# Patient Record
Sex: FEMALE | Race: WHITE | NOT HISPANIC OR LATINO | Employment: FULL TIME | URBAN - METROPOLITAN AREA
[De-identification: names, ages, dates, MRNs, and addresses within clinical notes are randomized per-mention and may not be internally consistent; named-entity substitution may affect disease eponyms.]

---

## 2017-02-23 ENCOUNTER — TELEPHONE (OUTPATIENT)
Dept: OBGYN | Facility: CLINIC | Age: 37
End: 2017-02-23

## 2018-05-18 ENCOUNTER — OFFICE VISIT (OUTPATIENT)
Dept: OBGYN | Facility: CLINIC | Age: 38
End: 2018-05-18
Payer: COMMERCIAL

## 2018-05-18 VITALS
SYSTOLIC BLOOD PRESSURE: 110 MMHG | DIASTOLIC BLOOD PRESSURE: 58 MMHG | HEIGHT: 68 IN | HEART RATE: 68 BPM | WEIGHT: 185 LBS | BODY MASS INDEX: 28.04 KG/M2

## 2018-05-18 DIAGNOSIS — R30.0 DYSURIA: Primary | ICD-10-CM

## 2018-05-18 LAB
ALBUMIN UR-MCNC: NEGATIVE MG/DL
APPEARANCE UR: CLEAR
BILIRUB UR QL STRIP: NEGATIVE
COLOR UR AUTO: YELLOW
GLUCOSE UR STRIP-MCNC: NEGATIVE MG/DL
HGB UR QL STRIP: NEGATIVE
KETONES UR STRIP-MCNC: NEGATIVE MG/DL
LEUKOCYTE ESTERASE UR QL STRIP: NEGATIVE
NITRATE UR QL: NEGATIVE
PH UR STRIP: 7 PH (ref 5–7)
SOURCE: NORMAL
SP GR UR STRIP: 1.01 (ref 1–1.03)
UROBILINOGEN UR STRIP-ACNC: 0.2 EU/DL (ref 0.2–1)

## 2018-05-18 PROCEDURE — 99213 OFFICE O/P EST LOW 20 MIN: CPT | Performed by: NURSE PRACTITIONER

## 2018-05-18 PROCEDURE — 87086 URINE CULTURE/COLONY COUNT: CPT | Performed by: NURSE PRACTITIONER

## 2018-05-18 PROCEDURE — 81003 URINALYSIS AUTO W/O SCOPE: CPT | Performed by: NURSE PRACTITIONER

## 2018-05-18 NOTE — MR AVS SNAPSHOT
"              After Visit Summary   2018    Shayy Ndiaye    MRN: [id number]           Patient Information     Date Of Birth          1980        Visit Information        Provider Department      2018 1:00 PM Serina Palacios APRN CNP [facility]        Today's Diagnoses     Dysuria    -  1       Follow-ups after your visit        Who to contact     If you have questions or need follow up information about today's clinic visit or your schedule please contact [facility] directly at [phone].  Normal or non-critical lab and imaging results will be communicated to you by Pretty Simplehart, letter or phone within 4 business days after the clinic has received the results. If you do not hear from us within 7 days, please contact the clinic through Pretty Simplehart or phone. If you have a critical or abnormal lab result, we will notify you by phone as soon as possible.  Submit refill requests through Ceros or call your pharmacy and they will forward the refill request to us. Please allow 3 business days for your refill to be completed.          Additional Information About Your Visit        MyChart Information     Ceros lets you send messages to your doctor, view your test results, renew your prescriptions, schedule appointments and more. To sign up, go to [email or web address] . Click on \"Log in\" on the left side of the screen, which will take you to the Welcome page. Then click on \"Sign up Now\" on the right side of the page.     You will be asked to enter the access code listed below, as well as some personal information. Please follow the directions to create your username and password.     Your access code is: [email or web address]  Expires: 2018  1:38 PM     Your access code will  in 90 days. If you need help or a new code, please call your [place] clinic or [phone].        Care EveryWhere ID     This is your Care EveryWhere ID. This could be used by other " "organizations to access your [facility] medical records  [id number]        Your Vitals Were     Pulse Height Last Period BMI (Body Mass Index)          68 5' 8\" (1.727 m) 05/05/2018 28.13 kg/m2         Blood Pressure from Last 3 Encounters:   05/18/18 110/58   07/23/15 120/74    Weight from Last 3 Encounters:   05/18/18 185 lb (83.9 kg)   07/23/15 185 lb (83.9 kg)              We Performed the Following     UA without Microscopic     Urine Culture Aerobic Bacterial        Primary Care Provider Office Phone # Fax #    [facility] [phone]       [facility] [address] [place] [address] AB  [facility] [address]  [facility] [address]        Equal Access to Services     SANTIAGO MCCORMICK : Hadii chip hutchisono Soyves, waaxda luqadaha, qaybta kaalmada adeegyada, laly kim . So [facility] [phone].    ATENCIÓN: Si habla español, tiene a lawrence disposición servicios gratuitos de asistencia lingüística. Llame al [phone].    We comply with applicable federal civil rights laws and [place] laws. We do not discriminate on the basis of race, color, national origin, age, disability, sex, sexual orientation, or gender identity.            Thank you!     Thank you for choosing [facility]  for your care. Our goal is always to provide you with excellent care. Hearing back from our patients is one way we can continue to improve our services. Please take a few minutes to complete the written survey that you may receive in the mail after your visit with us. Thank you!             Your Updated Medication List - Protect others around you: Learn how to safely use, store and throw away your medicines at www.disposemymeds.org.          This list is accurate as of 5/18/18  1:38 PM.  Always use your most recent med list.                   Brand Name Dispense Instructions for use Diagnosis    nystatin-triamcinolone cream    MYCOLOG II    15 g    Apply " topically 2 times daily    Rash and nonspecific skin eruption

## 2018-05-18 NOTE — PROGRESS NOTES
"    SUBJECTIVE:                                                   Shayy Ndiaye is a 37 year old female who presents to clinic today for the following health issue(s):  Patient presents with:  Urinary Problem: uti       HPI:  Pt here today with feeling of \"discomfort\" after she voids. x1 week. Has been pushing fluids     She denies dysuria, urinary urgency. Doesn't have any vaginal discharge, odor or itching.     Her cycles have become shorter, closer together.    Hx of endometriosis. Last LSC in . Pelvic adhesive disease. Denies pain with IC/BM/dysmenorrhea    Sexually active. Voids after IC.     New family history. Mom dx breast ca at age 64. Negative genetic testing.     Pt should have baseline mammo before age 42    Patient's last menstrual period was 2018..   Patient is sexually active, .  Using condoms for contraception.    reports that she has never smoked. She has never used smokeless tobacco.    STD testing offered?  Declined    Health maintenance updated:  yes    Today's PHQ-2 Score:   PHQ-2 (  [employer]) 2015   Q1: Little interest or pleasure in doing things 0   Q2: Feeling down, depressed or hopeless 0   PHQ-2 Score 0     Today's PHQ-9 Score: No flowsheet data found.  Today's PAYAM-7 Score: No flowsheet data found.    Problem list and histories reviewed & adjusted, as indicated.  Additional history: as documented.    Patient Active Problem List   Diagnosis     Endometriosis     Past Surgical History:   Procedure Laterality Date     D & C      elected AB     HC TOOTH EXTRACTION W/FORCEP       LAPAROSCOPY DIAGNOSTIC (GYN)  2008    endo     LAPAROSCOPY DIAGNOSTIC (GYN)  2008    endo     TONSILLECTOMY & ADENOIDECTOMY        Social History   Substance Use Topics     Smoking status: Never Smoker     Smokeless tobacco: Never Used     Alcohol use Yes      Problem (# of Occurrences) Relation (Name,Age of Onset)    DIABETES (2) Father, Paternal Aunt    Hyperlipidemia (1) Mother    " "        Current Outpatient Prescriptions   Medication Sig     nystatin-triamcinolone (MYCOLOG II) cream Apply topically 2 times daily     No current facility-administered medications for this visit.      Allergies   Allergen Reactions     Dust Mite Extract      STUFFY NOSE     Minocycline Unknown and Nausea     Nuts Rash     NOSE STUFFY   EYES ITCHY       ROS:      OBJECTIVE:     /58  Pulse 68  Ht 5' 8\" (1.727 m)  Wt 185 lb (83.9 kg)  LMP 05/05/2018  BMI 28.13 kg/m2  Body mass index is 28.13 kg/(m^2).    Exam:  Constitutional:  Appearance: Well nourished, well developed alert, in no acute distress  Neurologic/Psychiatric:  Mental Status:  Oriented X3   Pelvic Exam:  External Genitalia:     Normal appearance for age, no discharge present, no tenderness present, no inflammatory lesions present, color normal  Vagina:     Normal vaginal vault without central or paravaginal defects, no discharge present, no inflammatory lesions present, no masses present  Bladder:     Nontender to palpation  Urethra:   Urethral Body:  Urethra palpation normal, urethra structural support normal   Urethral Meatus:  No erythema or lesions present  Cervix:     Appearance healthy, no lesions present, nontender to palpation, no bleeding present  Uterus:     Uterus: firm, normal sized and nontender, midplane in position.   Adnexa:     No adnexal tenderness present, no adnexal masses present  Perineum:     Perineum within normal limits, no evidence of trauma, no rashes or skin lesions present  Anus:     Anus within normal limits, no hemorrhoids present  Inguinal Lymph Nodes:     No lymphadenopathy present  Pubic Hair:     Normal pubic hair distribution for age  Genitalia and Groin:     No rashes present, no lesions present, no areas of discoloration, no masses present       In-Clinic Test Results:  Results for orders placed or performed in visit on 05/18/18 (from the past 24 hour(s))   UA without Microscopic   Result Value Ref Range "    Color Urine Yellow     Appearance Urine Clear     Glucose Urine Negative NEG^Negative mg/dL    Bilirubin Urine Negative NEG^Negative    Ketones Urine Negative NEG^Negative mg/dL    Specific Gravity Urine 1.010 1.003 - 1.035    Blood Urine Negative NEG^Negative    pH Urine 7.0 5.0 - 7.0 pH    Protein Albumin Urine Negative NEG^Negative mg/dL    Urobilinogen Urine 0.2 0.2 - 1.0 EU/dL    Nitrite Urine Negative NEG^Negative    Leukocyte Esterase Urine Negative NEG^Negative    Source Midstream Urine        ASSESSMENT/PLAN:                                                        ICD-10-CM    1. Dysuria R30.0 UA without Microscopic     Urine Culture Aerobic Bacterial       There are no Patient Instructions on file for this visit.    Pt with negative UA. Will send UC. Likely she flushed any infection she may have had. Will monitor. Due for annual/pap.     SANNA Boswell CNP  [facility]

## 2018-05-19 LAB
BACTERIA SPEC CULT: NO GROWTH
Lab: NORMAL
SPECIMEN SOURCE: NORMAL

## 2018-06-08 ENCOUNTER — RESULT FOLLOW UP (OUTPATIENT)
Dept: OBGYN | Facility: CLINIC | Age: 38
End: 2018-06-08

## 2018-06-08 ENCOUNTER — OFFICE VISIT (OUTPATIENT)
Dept: OBGYN | Facility: CLINIC | Age: 38
End: 2018-06-08
Payer: COMMERCIAL

## 2018-06-08 VITALS
HEIGHT: 68 IN | SYSTOLIC BLOOD PRESSURE: 116 MMHG | WEIGHT: 183 LBS | BODY MASS INDEX: 27.74 KG/M2 | DIASTOLIC BLOOD PRESSURE: 78 MMHG | HEART RATE: 68 BPM

## 2018-06-08 DIAGNOSIS — Z13.6 SCREENING FOR CARDIOVASCULAR CONDITION: ICD-10-CM

## 2018-06-08 DIAGNOSIS — N80.9 ENDOMETRIOSIS: ICD-10-CM

## 2018-06-08 DIAGNOSIS — Z01.419 ENCOUNTER FOR GYNECOLOGICAL EXAMINATION WITHOUT ABNORMAL FINDING: Primary | ICD-10-CM

## 2018-06-08 DIAGNOSIS — R87.610 ASCUS WITH POSITIVE HIGH RISK HPV CERVICAL: ICD-10-CM

## 2018-06-08 DIAGNOSIS — E55.9 VITAMIN D DEFICIENCY: ICD-10-CM

## 2018-06-08 DIAGNOSIS — R87.810 ASCUS WITH POSITIVE HIGH RISK HPV CERVICAL: ICD-10-CM

## 2018-06-08 DIAGNOSIS — E53.9 VITAMIN B-COMPLEX DEFICIENCY: ICD-10-CM

## 2018-06-08 DIAGNOSIS — Z13.228 SCREENING FOR METABOLIC DISORDER: ICD-10-CM

## 2018-06-08 DIAGNOSIS — Z12.31 ENCOUNTER FOR SCREENING MAMMOGRAM FOR MALIGNANT NEOPLASM OF BREAST: ICD-10-CM

## 2018-06-08 DIAGNOSIS — Z13.29 SCREENING FOR THYROID DISORDER: ICD-10-CM

## 2018-06-08 LAB
ALBUMIN SERPL-MCNC: 4.1 G/DL (ref 3.4–5)
ALP SERPL-CCNC: 89 U/L (ref 40–150)
ALT SERPL W P-5'-P-CCNC: 31 U/L (ref 0–50)
ANION GAP SERPL CALCULATED.3IONS-SCNC: 9 MMOL/L (ref 3–14)
AST SERPL W P-5'-P-CCNC: 20 U/L (ref 0–45)
BILIRUB SERPL-MCNC: 0.2 MG/DL (ref 0.2–1.3)
BUN SERPL-MCNC: 6 MG/DL (ref 7–30)
CALCIUM SERPL-MCNC: 9.1 MG/DL (ref 8.5–10.1)
CHLORIDE SERPL-SCNC: 107 MMOL/L (ref 94–109)
CHOLEST SERPL-MCNC: 135 MG/DL
CO2 SERPL-SCNC: 25 MMOL/L (ref 20–32)
CREAT SERPL-MCNC: 0.64 MG/DL (ref 0.52–1.04)
GFR SERPL CREATININE-BSD FRML MDRD: >90 ML/MIN/1.7M2
GLUCOSE SERPL-MCNC: 99 MG/DL (ref 70–99)
HDLC SERPL-MCNC: 58 MG/DL
LDLC SERPL CALC-MCNC: 70 MG/DL
NONHDLC SERPL-MCNC: 77 MG/DL
POTASSIUM SERPL-SCNC: 3.8 MMOL/L (ref 3.4–5.3)
PROT SERPL-MCNC: 7.5 G/DL (ref 6.8–8.8)
SODIUM SERPL-SCNC: 141 MMOL/L (ref 133–144)
TRIGL SERPL-MCNC: 37 MG/DL
TSH SERPL DL<=0.005 MIU/L-ACNC: 1.79 MU/L (ref 0.4–4)
VIT B12 SERPL-MCNC: 843 PG/ML (ref 193–986)

## 2018-06-08 PROCEDURE — 84443 ASSAY THYROID STIM HORMONE: CPT | Performed by: NURSE PRACTITIONER

## 2018-06-08 PROCEDURE — 82306 VITAMIN D 25 HYDROXY: CPT | Performed by: NURSE PRACTITIONER

## 2018-06-08 PROCEDURE — 84591 ASSAY OF NOS VITAMIN: CPT | Mod: 90 | Performed by: NURSE PRACTITIONER

## 2018-06-08 PROCEDURE — 36415 COLL VENOUS BLD VENIPUNCTURE: CPT | Performed by: NURSE PRACTITIONER

## 2018-06-08 PROCEDURE — 99395 PREV VISIT EST AGE 18-39: CPT | Performed by: NURSE PRACTITIONER

## 2018-06-08 PROCEDURE — G0124 SCREEN C/V THIN LAYER BY MD: HCPCS | Performed by: NURSE PRACTITIONER

## 2018-06-08 PROCEDURE — 80053 COMPREHEN METABOLIC PANEL: CPT | Performed by: NURSE PRACTITIONER

## 2018-06-08 PROCEDURE — 87624 HPV HI-RISK TYP POOLED RSLT: CPT | Performed by: NURSE PRACTITIONER

## 2018-06-08 PROCEDURE — 84207 ASSAY OF VITAMIN B-6: CPT | Mod: 90 | Performed by: NURSE PRACTITIONER

## 2018-06-08 PROCEDURE — 80061 LIPID PANEL: CPT | Performed by: NURSE PRACTITIONER

## 2018-06-08 PROCEDURE — 99000 SPECIMEN HANDLING OFFICE-LAB: CPT | Performed by: NURSE PRACTITIONER

## 2018-06-08 PROCEDURE — G0145 SCR C/V CYTO,THINLAYER,RESCR: HCPCS | Performed by: NURSE PRACTITIONER

## 2018-06-08 PROCEDURE — 84252 ASSAY OF VITAMIN B-2: CPT | Mod: 90 | Performed by: NURSE PRACTITIONER

## 2018-06-08 PROCEDURE — 82607 VITAMIN B-12: CPT | Performed by: NURSE PRACTITIONER

## 2018-06-08 ASSESSMENT — ANXIETY QUESTIONNAIRES
6. BECOMING EASILY ANNOYED OR IRRITABLE: SEVERAL DAYS
7. FEELING AFRAID AS IF SOMETHING AWFUL MIGHT HAPPEN: NOT AT ALL
IF YOU CHECKED OFF ANY PROBLEMS ON THIS QUESTIONNAIRE, HOW DIFFICULT HAVE THESE PROBLEMS MADE IT FOR YOU TO DO YOUR WORK, TAKE CARE OF THINGS AT HOME, OR GET ALONG WITH OTHER PEOPLE: NOT DIFFICULT AT ALL
3. WORRYING TOO MUCH ABOUT DIFFERENT THINGS: SEVERAL DAYS
GAD7 TOTAL SCORE: 3
5. BEING SO RESTLESS THAT IT IS HARD TO SIT STILL: NOT AT ALL
2. NOT BEING ABLE TO STOP OR CONTROL WORRYING: NOT AT ALL
1. FEELING NERVOUS, ANXIOUS, OR ON EDGE: SEVERAL DAYS

## 2018-06-08 ASSESSMENT — PATIENT HEALTH QUESTIONNAIRE - PHQ9: 5. POOR APPETITE OR OVEREATING: NOT AT ALL

## 2018-06-08 NOTE — MR AVS SNAPSHOT
After Visit Summary   6/8/2018    Shayy Ndiaye    MRN: [id number]           Patient Information     Date Of Birth          1980        Visit Information        Provider Department      6/8/2018 9:00 AM Serina Palacios APRN CNP [facility]        Today's Diagnoses     Encounter for gynecological examination without abnormal finding    -  1    Screening for cardiovascular condition        Screening for metabolic disorder        Endometriosis        Vitamin D deficiency        Vitamin B-complex deficiency        Screening for thyroid disorder        Encounter for screening mammogram for malignant neoplasm of breast           Follow-ups after your visit        Follow-up notes from your care team     Return in about 1 year (around 6/8/2019).      Future tests that were ordered for you today     Open Future Orders        Priority Expected Expires Ordered    MA Screen Bilateral w/Garett Routine  6/8/2019 6/8/2018    US Transvaginal Non OB Routine  6/8/2019 6/8/2018            Who to contact     If you have questions or need follow up information about today's clinic visit or your schedule please contact [facility] directly at [phone].  Normal or non-critical lab and imaging results will be communicated to you by MyChart, letter or phone within 4 business days after the clinic has received the results. If you do not hear from us within 7 days, please contact the clinic through Reebonzhart or phone. If you have a critical or abnormal lab result, we will notify you by phone as soon as possible.  Submit refill requests through Freight Connection or call your pharmacy and they will forward the refill request to us. Please allow 3 business days for your refill to be completed.          Additional Information About Your Visit        MyChart Information     Freight Connection lets you send messages to your doctor, view your test results, renew your prescriptions, schedule appointments and  "more. To sign up, go to [email or web address] . Click on \"Log in\" on the left side of the screen, which will take you to the Welcome page. Then click on \"Sign up Now\" on the right side of the page.     You will be asked to enter the access code listed below, as well as some personal information. Please follow the directions to create your username and password.     Your access code is: [email or web address]  Expires: 2018  1:38 PM     Your access code will  in 90 days. If you need help or a new code, please call your [place] clinic or [phone].        Care EveryWhere ID     This is your Care EveryWhere ID. This could be used by other organizations to access your [facility] medical records  [id number]        Your Vitals Were     Pulse Height Last Period BMI (Body Mass Index)          68 5' 8\" (1.727 m) 2018 27.83 kg/m2         Blood Pressure from Last 3 Encounters:   18 116/78   18 110/58   07/23/15 120/74    Weight from Last 3 Encounters:   18 183 lb (83 kg)   18 185 lb (83.9 kg)   07/23/15 185 lb (83.9 kg)              We Performed the Following     Comprehensive metabolic panel     HPV High Risk Types DNA Cervical     Lipid panel reflex to direct LDL Fasting     Pap imaged thin layer screen with HPV - recommended age 30 - 65     TSH with free T4 reflex     Vitamin B12     Vitamin B2     Vitamin B3     Vitamin B6     Vitamin D deficiency screening        Primary Care Provider Office Phone # Fax #    [facility] [phone]       [facility] [address]  [facility] [address]  [facility] [address]        Equal Access to Services     SANTIAGO MCCORMICK : Mark Walls, david collins, jose j madrigal, laly raman. So [facility] [phone].    ATENCIÓN: Si habla español, tiene a lawrence disposición servicios gratuitos de asistencia lingüística. Llame al [phone].    We comply " with applicable federal civil rights laws and [place] laws. We do not discriminate on the basis of race, color, national origin, age, disability, sex, sexual orientation, or gender identity.            Thank you!     Thank you for choosing [facility]  for your care. Our goal is always to provide you with excellent care. Hearing back from our patients is one way we can continue to improve our services. Please take a few minutes to complete the written survey that you may receive in the mail after your visit with us. Thank you!             Your Updated Medication List - Protect others around you: Learn how to safely use, store and throw away your medicines at www.disposemymeds.org.          This list is accurate as of 6/8/18  9:38 AM.  Always use your most recent med list.                   Brand Name Dispense Instructions for use Diagnosis    vitamin B complex with vitamin C Tabs tablet      Take 1 tablet by mouth daily        VITAMIN D (CHOLECALCIFEROL) PO      Take by mouth daily

## 2018-06-08 NOTE — PROGRESS NOTES
Shayy is a 37 year old  female who presents for annual exam.     Besides routine health maintenance,  she would like to discuss periods, ovulation pain-may want labs checked.    HPI:  The patient's PCP is  [facility].  Pt here today for her annual exam.   She is feeling well. She's had a nagging right adnexal pain for about 5 days. Her cycles have become shorter, about 21 days vs 28. She denies pain with IC/bladder/colon function. Hx of endometriosis. lsc in . Has not had imaging for her endometriosis in 10 years or so. She states in 2008 she had stage 4, went through lupron. She has a natalie pain threshold so she's not reliable in terms of symptoms she says    Is not preventing nor trying for pregnancy. Practicing NFP.    Pt requesting labs today. She want's a full panel of B vitamins. She supplements but thinks she's getting too much.     GYNECOLOGIC HISTORY:    Patient's last menstrual period was 2018.  Her current contraception method is: none.  She  reports that she has never smoked. She has never used smokeless tobacco.    Patient is sexually active.  STD testing offered?  Declined  Last PHQ-9 score on record =   PHQ-9 SCORE 2018   Total Score 2     Last GAD7 score on record =   PAYAM-7 SCORE 2018   Total Score 3     Alcohol Score = 2    HEALTH MAINTENANCE:  Cholesterol: long ago  Last Mammo: never, Result: not applicable, Next Mammo:  Age 42  Pap:   Lab Results   Component Value Date    PAP NIL 2015      Colonoscopy:  never, Result: not applicable, Next Colonoscopy: age 52 years.  Dexa:  never    Health maintenance updated:  no    HISTORY:  Obstetric History       T0      L0     SAB0   TAB1   Ectopic0   Multiple0   Live Births0       # Outcome Date GA Lbr [place]/2nd Weight Sex Delivery Anes PTL Lv   1 TAB                   Patient Active Problem List   Diagnosis     Endometriosis     Past Surgical History:   Procedure Laterality Date     D  "& C  1999    elected [facility] TOOTH EXTRACTION W/FORCEP       LAPAROSCOPY DIAGNOSTIC (GYN)  05/2008    endo     LAPAROSCOPY DIAGNOSTIC (GYN)  09/2008    endo     TONSILLECTOMY & ADENOIDECTOMY        Social History   Substance Use Topics     Smoking status: Never Smoker     Smokeless tobacco: Never Used     Alcohol use Yes      Problem (# of Occurrences) Relation (Name,Age of Onset)    DIABETES (2) Father, Paternal Aunt    Hyperlipidemia (1) Mother            Current Outpatient Prescriptions   Medication Sig     vitamin B complex with vitamin C (VITAMIN  B COMPLEX) TABS tablet Take 1 tablet by mouth daily     VITAMIN D, CHOLECALCIFEROL, PO Take by mouth daily     No current facility-administered medications for this visit.      Allergies   Allergen Reactions     Dust Mite Extract      STUFFY NOSE     Minocycline Unknown and Nausea     Nuts Rash     NOSE STUFFY   EYES ITCHY       Past medical, surgical, social and family histories were reviewed and updated in EPIC.    ROS:   12 point review of systems negative other than symptoms noted below.  Breast: Tenderness  Genitourinary: Irregular Menses, Pelvic Pain and Significant PMS    EXAM:  /78  Pulse 68  Ht 5' 8\" (1.727 m)  Wt 183 lb (83 kg)  LMP 05/30/2018  BMI 27.83 kg/m2   BMI: Body mass index is 27.83 kg/(m^2).    PHYSICAL EXAM:  Constitutional:  Appearance: Well nourished, well developed, alert, in no acute distress  Neck:  Lymph Nodes:  No lymphadenopathy present    Thyroid:  Gland size normal, nontender, no nodules or masses present  on palpation  Chest:  Respiratory Effort:  Breathing unlabored  Cardiovascular:    Heart: Auscultation:  Regular rate, normal rhythm, no murmurs present  Breasts: Inspection of Breasts:  No lymphadenopathy present., Palpation of Breasts and Axillae:  No masses present on palpation, no breast tenderness., Axillary Lymph Nodes:  No lymphadenopathy present. and No nodularity, asymmetry or nipple discharge " bilaterally.  Gastrointestinal:   Abdominal Examination:  Abdomen nontender to palpation, tone normal without rigidity or guarding, no masses present, umbilicus without lesions   Liver and Spleen:  No hepatomegaly present, liver nontender to palpation    Hernias:  No hernias present  Lymphatic: Lymph Nodes:  No other lymphadenopathy present  Skin:  General Inspection:  No rashes present, no lesions present, no areas of  discoloration    Genitalia and Groin:  No rashes present, no lesions present, no areas of  discoloration, no masses present  Neurologic/Psychiatric:    Mental Status:  Oriented X3     Pelvic Exam:  External Genitalia:     Normal appearance for age, no discharge present, no tenderness present, no inflammatory lesions present, color normal  Vagina:     Normal vaginal vault without central or paravaginal defects, no discharge present, no inflammatory lesions present, no masses present  Bladder:     Nontender to palpation  Urethra:   Urethral Body:  Urethra palpation normal, urethra structural support normal   Urethral Meatus:  No erythema or lesions present  Cervix:     Appearance healthy, no lesions present, nontender to palpation, no bleeding present  Uterus:     Uterus: firm, normal sized and nontender, midplane in position.   Adnexa:     No adnexal tenderness present, no adnexal masses present  Perineum:     Perineum within normal limits, no evidence of trauma, no rashes or skin lesions present  Anus:     Anus within normal limits, no hemorrhoids present  Inguinal Lymph Nodes:     No lymphadenopathy present  Pubic Hair:     Normal pubic hair distribution for age  Genitalia and Groin:     No rashes present, no lesions present, no areas of discoloration, no masses present      COUNSELING:   Special attention given to:        Regular exercise       Healthy diet/nutrition       Family planning    BMI: Body mass index is 27.83 kg/(m^2).  Weight management plan: Discussed healthy diet and exercise  guidelines and patient will follow up in 12 months in clinic to re-evaluate.    ASSESSMENT:  37 year old female with satisfactory annual exam.    ICD-10-CM    1. Encounter for gynecological examination without abnormal finding Z01.419 Pap imaged thin layer screen with HPV - recommended age 30 - 65     HPV High Risk Types DNA Cervical   2. Screening for cardiovascular condition Z13.6 Lipid panel reflex to direct LDL Fasting   3. Screening for metabolic disorder Z13.228 Comprehensive metabolic panel   4. Endometriosis N80.9 US Transvaginal Non OB   5. Vitamin D deficiency E55.9 Vitamin D deficiency screening   6. Vitamin B-complex deficiency E53.9 Vitamin B2     Vitamin B12     Vitamin B6     Vitamin B3   7. Screening for thyroid disorder Z13.29 TSH with free T4 reflex   8. Encounter for screening mammogram for malignant neoplasm of breast Z12.31 MA Screen Bilateral w/Garett       PLAN:  Normal gyn exam. No adnexal pain with exam. Annual pap screening recommended. Mother with BReast ca. Will do baseline mammo this year. Then annually at 42. Labs today. Us for endo update.    Serina Palacios, SANNA CNP

## 2018-06-08 NOTE — LETTER
6/19/2018     Shayy Ndiaye  [address] [facility] [address]        Shayyjay Ndiaye your lab results came back normal.       Results for orders placed or performed in visit on 06/08/18   Result Value Ref Range    Cholesterol 135 <200 mg/dL    Triglycerides 37 <150 mg/dL    HDL Cholesterol 58 >49 mg/dL    LDL Cholesterol Calculated 70 <100 mg/dL    Non HDL Cholesterol 77 <130 mg/dL   Comprehensive metabolic panel   Result Value Ref Range    Sodium 141 133 - 144 mmol/L    Potassium 3.8 3.4 - 5.3 mmol/L    Chloride 107 94 - 109 mmol/L    Carbon Dioxide 25 20 - 32 mmol/L    Anion Gap 9 3 - 14 mmol/L    Glucose 99 70 - 99 mg/dL    Urea Nitrogen 6 (L) 7 - 30 mg/dL    Creatinine 0.64 0.52 - 1.04 mg/dL    GFR Estimate >90 >60 mL/min/1.7m2    GFR Estimate If Black >90 >60 mL/min/1.7m2    Calcium 9.1 8.5 - 10.1 mg/dL    Bilirubin Total 0.2 0.2 - 1.3 mg/dL    Albumin 4.1 3.4 - 5.0 g/dL    Protein Total 7.5 6.8 - 8.8 g/dL    Alkaline Phosphatase 89 40 - 150 U/L    ALT 31 0 - 50 U/L    AST 20 0 - 45 U/L   Vitamin D deficiency screening   Result Value Ref Range    Vitamin D Deficiency screening 45 20 - 75 ug/L   Vitamin B2   Result Value Ref Range    Vitamin B2 12 1 - 19 mcg/L   Vitamin B12   Result Value Ref Range    Vitamin B12 843 193 - 986 pg/mL   Vitamin B6   Result Value Ref Range    Vitamin B6 227.9 (H) 20.0 - 125.0 nmol/L   Vitamin B3   Result Value Ref Range    Niacin (Vitamin B3) 4.91 0.50 - 8.45 ug/mL   TSH with free T4 reflex   Result Value Ref Range    TSH 1.79 0.40 - 4.00 mU/L       Call our office if you have any questions.    Cordially,    SANNA Boswell CNP

## 2018-06-09 ASSESSMENT — PATIENT HEALTH QUESTIONNAIRE - PHQ9: SUM OF ALL RESPONSES TO PHQ QUESTIONS 1-9: 2

## 2018-06-09 ASSESSMENT — ANXIETY QUESTIONNAIRES: GAD7 TOTAL SCORE: 3

## 2018-06-11 LAB
DEPRECATED CALCIDIOL+CALCIFEROL SERPL-MC: 45 UG/L (ref 20–75)
VIT B6 SERPL-MCNC: 227.9 NMOL/L (ref 20–125)

## 2018-06-12 ENCOUNTER — OFFICE VISIT (OUTPATIENT)
Dept: OBGYN | Facility: CLINIC | Age: 38
End: 2018-06-12
Attending: NURSE PRACTITIONER
Payer: COMMERCIAL

## 2018-06-12 ENCOUNTER — RADIANT APPOINTMENT (OUTPATIENT)
Dept: ULTRASOUND IMAGING | Facility: CLINIC | Age: 38
End: 2018-06-12
Attending: NURSE PRACTITIONER
Payer: COMMERCIAL

## 2018-06-12 VITALS
DIASTOLIC BLOOD PRESSURE: 86 MMHG | SYSTOLIC BLOOD PRESSURE: 162 MMHG | HEIGHT: 68 IN | WEIGHT: 183 LBS | BODY MASS INDEX: 27.74 KG/M2

## 2018-06-12 DIAGNOSIS — N80.109 ENDOMETRIOSIS OF OVARY: Primary | ICD-10-CM

## 2018-06-12 DIAGNOSIS — N80.9 ENDOMETRIOSIS: ICD-10-CM

## 2018-06-12 LAB
COPATH REPORT: ABNORMAL
PAP: ABNORMAL
VIT B2 SERPL-MCNC: 12 MCG/L (ref 1–19)

## 2018-06-12 PROCEDURE — 76830 TRANSVAGINAL US NON-OB: CPT | Performed by: OBSTETRICS & GYNECOLOGY

## 2018-06-12 PROCEDURE — 99214 OFFICE O/P EST MOD 30 MIN: CPT | Performed by: OBSTETRICS & GYNECOLOGY

## 2018-06-12 NOTE — PROGRESS NOTES
SUBJECTIVE:                                                   Shayy Ndiaye is a 37 year old female who presents to clinic today for the following health issue(s):  No chief complaint on file.      HPI: The patient is seen at this time in follow-up of history of endometriosis.  She has had a couple of terrible cycles and has right lower quadrant pain.      Patient's last menstrual period was 2018..   Patient is sexually active, .  Using none for contraception.    reports that she has never smoked. She has never used smokeless tobacco.    STD testing offered?  Declined    Health maintenance updated:  yes    Today's PHQ-2 Score:   PHQ-2 (  [employer]) 2015   Q1: Little interest or pleasure in doing things 0   Q2: Feeling down, depressed or hopeless 0   PHQ-2 Score 0     Today's PHQ-9 Score:   PHQ-9 SCORE 2018   Total Score 2     Today's PAYAM-7 Score:   PAYAM-7 SCORE 2018   Total Score 3       Problem list and histories reviewed & adjusted, as indicated.  Additional history: as documented.    Patient Active Problem List   Diagnosis     Endometriosis     Past Surgical History:   Procedure Laterality Date     D & C      elected AB     HC TOOTH EXTRACTION W/FORCEP       LAPAROSCOPY DIAGNOSTIC (GYN)  2008    endo     LAPAROSCOPY DIAGNOSTIC (GYN)  2008    endo     TONSILLECTOMY & ADENOIDECTOMY        Social History   Substance Use Topics     Smoking status: Never Smoker     Smokeless tobacco: Never Used     Alcohol use Yes      Problem (# of Occurrences) Relation (Name,Age of Onset)    DIABETES (2) Father, Paternal Aunt    Hyperlipidemia (1) Mother            Current Outpatient Prescriptions   Medication Sig     vitamin B complex with vitamin C (VITAMIN  B COMPLEX) TABS tablet Take 1 tablet by mouth daily     VITAMIN D, CHOLECALCIFEROL, PO Take by mouth daily     No current facility-administered medications for this visit.      Allergies   Allergen Reactions     Dust Mite Extract       STUFFY NOSE     Minocycline Unknown and Nausea     Nuts Rash     NOSE STUFFY   EYES ITCHY       ROS:  12 point review of systems negative other than symptoms noted below.    OBJECTIVE:     LMP 05/30/2018  There is no height or weight on file to calculate BMI.    Exam:  Constitutional:  Appearance: Well nourished, well developed alert, in no acute distress     In-Clinic Test Results:  Results for orders placed or performed in visit on 06/08/18   Pap imaged thin layer screen with HPV - recommended age 30 - 65   Result Value Ref Range    PAP ASC-US (A)     Copath Report         Patient Name: MAVERICK PALACIOS  MR#: [id number]  Specimen #: [id number]  Collected: 6/8/2018  Received: 6/11/2018  Reported: 6/12/2018 14:25  Ordering Phy(s): GELACIO QUIROGA    For improved result formatting, select 'View Enhanced Report Format' under   Linked Documents section.    SPECIMEN/STAIN PROCESS:  Pap imaged thin layer prep screening (Surepath, FocalPoint with guided   screening)       Pap-Cyto x 1, HPV ordered x 1    SOURCE: Cervical  ----------------------------------------------------------------   Pap imaged thin layer prep screening (Surepath, FocalPoint with guided   screening)  SPECIMEN ADEQUACY:  Satisfactory for evaluation.  -Transformation zone component present.    CYTOLOGIC INTERPRETATION:    Epithelial cell abnormality:  squamous cell:  atypical squamous cells-of   undetermined significance (ASC-US).    Electronically signed out by:    Alin Serrano M.D.    Processed and screened at [facility],   [facility] LINICAL HISTORY:    Previous normal pap  Date of Last Pap: 7/23/2015,    Papanicolaou Test Limitations:  Cervical cytology is a screening test with   limited sensitivity; regular  screening is critical for cancer prevention; Pap tests are primarily   effective for the diagnosis/prevention of  squamous cell carcinoma, not adenocarcinomas or other cancers.    TESTING  LAB LOCATION:  [address] [place]  [id number] [phone]    COLLECTION SITE:  Client:  [facility]  Location: [facility] (S)     Lipid panel reflex to direct LDL Fasting   Result Value Ref Range    Cholesterol 135 <200 mg/dL    Triglycerides 37 <150 mg/dL    HDL Cholesterol 58 >49 mg/dL    LDL Cholesterol Calculated 70 <100 mg/dL    Non HDL Cholesterol 77 <130 mg/dL   Comprehensive metabolic panel   Result Value Ref Range    Sodium 141 133 - 144 mmol/L    Potassium 3.8 3.4 - 5.3 mmol/L    Chloride 107 94 - 109 mmol/L    Carbon Dioxide 25 20 - 32 mmol/L    Anion Gap 9 3 - 14 mmol/L    Glucose 99 70 - 99 mg/dL    Urea Nitrogen 6 (L) 7 - 30 mg/dL    Creatinine 0.64 0.52 - 1.04 mg/dL    GFR Estimate >90 >60 mL/min/1.7m2    GFR Estimate If Black >90 >60 mL/min/1.7m2    Calcium 9.1 8.5 - 10.1 mg/dL    Bilirubin Total 0.2 0.2 - 1.3 mg/dL    Albumin 4.1 3.4 - 5.0 g/dL    Protein Total 7.5 6.8 - 8.8 g/dL    Alkaline Phosphatase 89 40 - 150 U/L    ALT 31 0 - 50 U/L    AST 20 0 - 45 U/L   Vitamin D deficiency screening   Result Value Ref Range    Vitamin D Deficiency screening 45 20 - 75 ug/L   Vitamin B2   Result Value Ref Range    Vitamin B2 12 1 - 19 mcg/L   Vitamin B12   Result Value Ref Range    Vitamin B12 843 193 - 986 pg/mL   Vitamin B6   Result Value Ref Range    Vitamin B6 227.9 (H) 20.0 - 125.0 nmol/L   TSH with free T4 reflex   Result Value Ref Range    TSH 1.79 0.40 - 4.00 mU/L     Ultrasound shows nodularity in Endo on the right ovary and a few adhesions on the left.    ASSESSMENT/PLAN:                                                        Patient with return of debilitating pain.  The option of suppression with continuous low-dose birth control pills versus surgical intervention versus observation were presented.  She will observe her next 2 cycles and return in August.  We will repeat her pelvic exam at that time and review her options.        Edward  ARIANE Urias MD  [facility]

## 2018-06-12 NOTE — MR AVS SNAPSHOT
"              After Visit Summary   6/12/2018    Shayy Ndiaye    MRN: [id number]           Patient Information     Date Of Birth          1980        Visit Information        Provider Department      6/12/2018 3:30 PM Mino Urias MD [facility]        Today's Diagnoses     Endometriosis of ovary    -  1       Follow-ups after your visit        Your next 10 appointments already scheduled     Aug 13, 2018  4:00 PM CDT   SHORT with Mino Urias MD   [facility]    [address] [place] [id number] [phone]              Who to contact     If you have questions or need follow up information about today's clinic visit or your schedule please contact [facility] directly at [phone].  Normal or non-critical lab and imaging results will be communicated to you by Wongnaihart, letter or phone within 4 business days after the clinic has received the results. If you do not hear from us within 7 days, please contact the clinic through Wongnaihart or phone. If you have a critical or abnormal lab result, we will notify you by phone as soon as possible.  Submit refill requests through Aunalytics or call your pharmacy and they will forward the refill request to us. Please allow 3 business days for your refill to be completed.          Additional Information About Your Visit        MyChart Information     Aunalytics lets you send messages to your doctor, view your test results, renew your prescriptions, schedule appointments and more. To sign up, go to [email or web address] . Click on \"Log in\" on the left side of the screen, which will take you to the Welcome page. Then click on \"Sign up Now\" on the right side of the page.     You will be asked to enter the access code listed below, as well as some personal information. Please follow the directions to create your username and password.     Your access " "code is: [email or web address]  Expires: 2018  1:38 PM     Your access code will  in 90 days. If you need help or a new code, please call your [place] clinic or [phone].        Care EveryWhere ID     This is your Care EveryWhere ID. This could be used by other organizations to access your [place] medical records  [id number]        Your Vitals Were     Height Last Period BMI (Body Mass Index)             5' 8\" (1.727 m) 2018 27.83 kg/m2          Blood Pressure from Last 3 Encounters:   18 162/86   18 116/78   18 110/58    Weight from Last 3 Encounters:   18 183 lb (83 kg)   18 183 lb (83 kg)   18 185 lb (83.9 kg)              Today, you had the following     No orders found for display       Primary Care Provider Office Phone # Fax #    [facility] [place] [phone]       [address]  [facility] [address]        Equal Access to Services     SANTIAGO MCCORMICK AH: Hadii aad ku hadasho Soomaali, waaxda luqadaha, qaybta kaalmada adeegyada, laly raman. So [facility] [place] [facility] [phone].    ATENCIÓN: Si habla español, tiene a lawrence disposición servicios gratuitos de asistencia lingüística. Nevaeh al [phone].    We comply with applicable federal civil rights laws and [place] laws. We do not discriminate on the basis of race, color, national origin, age, disability, sex, sexual orientation, or gender identity.            Thank you!     Thank you for choosing [facility]  for your care. Our goal is always to provide you with excellent care. Hearing back from our patients is one way we can continue to improve our services. Please take a few minutes to complete the written survey that you may receive in the mail after your visit with us. Thank you!             Your Updated Medication List - Protect others around you: Learn how to safely use, store and throw " away your medicines at www.disposemymeds.org.          This list is accurate as of 6/12/18  4:23 PM.  Always use your most recent med list.                   Brand Name Dispense Instructions for use Diagnosis    vitamin B complex with vitamin C Tabs tablet      Take 1 tablet by mouth daily        VITAMIN D (CHOLECALCIFEROL) PO      Take by mouth daily

## 2018-06-13 LAB
FINAL DIAGNOSIS: ABNORMAL
HPV HR 12 DNA CVX QL NAA+PROBE: POSITIVE
HPV16 DNA SPEC QL NAA+PROBE: NEGATIVE
HPV18 DNA SPEC QL NAA+PROBE: NEGATIVE
SPECIMEN DESCRIPTION: ABNORMAL
SPECIMEN SOURCE CVX/VAG CYTO: ABNORMAL

## 2018-06-14 NOTE — PROGRESS NOTES
6/8/18 ASCUS pap/+ HR HPV (not 16 or 18).  Plan: colposcopy by 9/8/18 6/14/18 Attempted call x 2.  Phone would disconnect when ringing.  Will try again.   6/15/18 left msg/adivsed of result and follow up  8/13/18 Colpo: visually normal.  No bx.  Dx ASCUS pap, + HR HPV (not 16 or 18).  Plan: cotest in 1 year  4/29/19 NIL pap, + HR HPV (not 16/18).  Plan: cotest in 1 year.  CCT tracking.

## 2018-06-19 LAB — NIACIN SERPL-MCNC: 4.91 UG/ML (ref 0.5–8.45)

## 2018-08-13 ENCOUNTER — OFFICE VISIT (OUTPATIENT)
Dept: OBGYN | Facility: CLINIC | Age: 38
End: 2018-08-13
Payer: COMMERCIAL

## 2018-08-13 VITALS
DIASTOLIC BLOOD PRESSURE: 74 MMHG | HEIGHT: 68 IN | WEIGHT: 188 LBS | HEART RATE: 66 BPM | BODY MASS INDEX: 28.49 KG/M2 | SYSTOLIC BLOOD PRESSURE: 122 MMHG

## 2018-08-13 DIAGNOSIS — R87.810 ASCUS WITH POSITIVE HIGH RISK HPV CERVICAL: Primary | ICD-10-CM

## 2018-08-13 DIAGNOSIS — R87.610 ASCUS WITH POSITIVE HIGH RISK HPV CERVICAL: Primary | ICD-10-CM

## 2018-08-13 PROCEDURE — 88141 CYTOPATH C/V INTERPRET: CPT | Performed by: OBSTETRICS & GYNECOLOGY

## 2018-08-13 PROCEDURE — 57452 EXAM OF CERVIX W/SCOPE: CPT | Performed by: OBSTETRICS & GYNECOLOGY

## 2018-08-13 PROCEDURE — 88175 CYTOPATH C/V AUTO FLUID REDO: CPT | Performed by: OBSTETRICS & GYNECOLOGY

## 2018-08-13 PROCEDURE — 87624 HPV HI-RISK TYP POOLED RSLT: CPT | Performed by: OBSTETRICS & GYNECOLOGY

## 2018-08-13 NOTE — MR AVS SNAPSHOT
"              After Visit Summary   2018    Shayy Ndiaye    MRN: [id number]           Patient Information     Date Of Birth          1980        Visit Information        Provider Department      2018 2:15 PM Mino Urias MD; WE COLPOSCOPE 2 [facility] [place]        Today's Diagnoses     ASCUS with positive high risk HPV cervical    -  1       Follow-ups after your visit        Who to contact     If you have questions or need follow up information about today's clinic visit or your schedule please contact [facility] directly at [phone].  Normal or non-critical lab and imaging results will be communicated to you by Rockstar Soloshart, letter or phone within 4 business days after the clinic has received the results. If you do not hear from us within 7 days, please contact the clinic through Rock[employer] Soloshart or phone. If you have a critical or abnormal lab result, we will notify you by phone as soon as possible.  Submit refill requests through iTiffin or call your pharmacy and they will forward the refill request to us. Please allow 3 business days for your refill to be completed.          Additional Information About Your Visit        MyChart Information     iTiffin lets you send messages to your doctor, view your test results, renew your prescriptions, schedule appointments and more. To sign up, go to [email or web address] . Click on \"Log in\" on the left side of the screen, which will take you to the Welcome page. Then click on \"Sign up Now\" on the right side of the page.     You will be asked to enter the access code listed below, as well as some personal information. Please follow the directions to create your username and password.     Your access code is: [email or web address]  Expires: 2018  1:49 PM     Your access code will  in 90 days. If you need help or a new code, please call your [facility] or [phone].        Care EveryWhere ID     This is your Care " "EveryWhere ID. This could be used by other organizations to access your [place] medical records  [id number]        Your Vitals Were     Pulse Height Last Period BMI (Body Mass Index)          66 5' 8\" (1.727 m) 07/17/2018 (Approximate) 28.59 kg/m2         Blood Pressure from Last 3 Encounters:   08/13/18 122/74   06/12/18 162/86   06/08/18 116/78    Weight from Last 3 Encounters:   08/13/18 188 lb (85.3 kg)   06/12/18 183 lb (83 kg)   06/08/18 183 lb (83 kg)              We Performed the Following     COLPOSCOPY CERVIX/UPPER VAGINA W BX CERVIX        Primary Care Provider Office Phone # Fax #    [facility] [phone]       [facility] [address]  [facility] [address]  [facility] [address]        Equal Access to Services     SANTIAGO MCCORMICK : Hadii aad ku hadasho Sosidali, waaxda luqadaha, qaybta kaalmada adeegyada, laly kim . So [facility] [phone].    ATENCIÓN: Si habla español, tiene a lawrence disposición servicios gratuitos de asistencia lingüística. Nevaeh al [phone].    We comply with applicable federal civil rights laws and [place] laws. We do not discriminate on the basis of race, color, national origin, age, disability, sex, sexual orientation, or gender identity.            Thank you!     Thank you for choosing [facility]  for your care. Our goal is always to provide you with excellent care. Hearing back from our patients is one way we can continue to improve our services. Please take a few minutes to complete the written survey that you may receive in the mail after your visit with us. Thank you!             Your Updated Medication List - Protect others around you: Learn how to safely use, store and throw away your medicines at www.disposemymeds.org.          This list is accurate as of 8/13/18  2:17 PM.  Always use your most recent med list.                   Brand Name Dispense Instructions for " use Diagnosis    vitamin B complex with vitamin C Tabs tablet      Take 1 tablet by mouth daily        VITAMIN D (CHOLECALCIFEROL) PO      Take by mouth daily

## 2018-08-13 NOTE — LETTER
Al 20, 2018      Shayy Ndiaye  [address] [address] [facility] [address]    Dear ,      This letter is in regards to your recent cervical cancer screening (PAP smear and HPV test) completed during your visually normal colposcopy on 8/13/18.    Your Pap smear result was reported as ASCUS or Atypical Squamous Cells of Undetermined Significance This means that there were mildly abnormal cells found in the sample that we collected from your cervix. The vast majority of patients with this result do not have significant cervical abnormalities.     Your cervical sample was also tested for the presence of Human Papillomavirus (HPV). Your sample was positive for HPV. There are many types of HPV, but we test pap samples for the high risk types. HPV can be the cause of an abnormal Pap smear result. The high risk types of HPV can also be related to the potential development of cervical cancer if not monitored and/or treated appropriately.    Over time, your body can get rid of these abnormal cells, so it is recommended that you repeat your PAP smear and HPV test in 1 year.    If you have questions about these results contact [facility] at [phone]    Please continue to be seen every year for an annual physical exam and other preventative tests.    Sincerely,      Mino Urias MD /HEDY RN

## 2018-08-13 NOTE — PROGRESS NOTES
INDICATIONS:                                                    Is a pregnancy test required: No.  Was a consent obtained?  Yes    Shayy Ndiaye, is a 38 year old female, who had a recent ASCUS pap.  HPV positive.  Yes prior history of abnormal pap. Here today for colposcopy. Discussed indication, risks of infection and bleeding.    Her last pap was   Lab Results   Component Value Date    PAP ASC-US, HPV+ 06/08/2018    .    PROCEDURE:                                                      Cervix is stained with acetic acid and viewed colposcopically. Squamocolumnar junction is visualized in it's entirety. no visible lesions . Biopsy done No. Endocervical curretage Not Done         POST PROCEDURE:                                                      IMPRESSION: Patient with negative colposcopy at this time.  She also had some right lower quadrant pain and question of recurrence of Endo but is now gone 2 months with no return of pain.    PLAN : Await the results of the biopsies.  Repeat pap in 12 months.  She  tolerated the procedure well. There were no complications. Patient was discharged in stable condition.    Patient advised to call the clinic if excessive bleeding, pelvic pain, or fever.     Follow-up plan based on pathology results.    Mino Urias MD

## 2018-08-16 LAB
COPATH REPORT: ABNORMAL
PAP: ABNORMAL

## 2019-04-25 NOTE — PROGRESS NOTES
Shayy is a 38 year old  female who presents for annual exam.     Besides routine health maintenance, she has no other health concerns today .    HPI:here for annual exam, patient is aware she is a few months early, but insurance going to be gone, so wanted it done prior.  She had a ASCUS pap with positive for other HPV.  Cycles are regular, does have bad cramps with them.      The patient's PCP is  [facility].        GYNECOLOGIC HISTORY:    Patient's last menstrual period was 2019 (exact date).  Her current contraception method is: none.  She  reports that she has never smoked. She has never used smokeless tobacco.    Patient is sexually active.  STD testing offered?  Declined  Last PHQ-9 score on record =   PHQ-9 SCORE 2019   PHQ-9 Total Score 1     Last GAD7 score on record =   PAYAM-7 SCORE 2019   Total Score 0     Alcohol Score = 1    HEALTH MAINTENANCE:  Cholesterol: 2018   Total= 135, Triglycerides=37, HDL=58, LDL=70  Cholesterol   Date Value Ref Range Status   2018 135 <200 mg/dL Final      Last Mammo: NA, Result: not applicable, Next Mammo: 4 years.  Pap: 2018 ASC-US, Other HPV+  Lab Results   Component Value Date    PAP ASC-US 2018    PAP ASC-US 2018    PAP NIL 2015      Colonoscopy:  NA, Result: not applicable, Next Colonoscopy: 14 years.  Dexa:  NA    Health maintenance updated:  yes    HISTORY:  OB History    Para Term  AB Living   1 0 0 0 1 0   SAB TAB Ectopic Multiple Live Births   0 1 0 0 0      # Outcome Date GA Lbr [place]/2nd Weight Sex Delivery Anes PTL Lv   1 TAB                Patient Active Problem List   Diagnosis     Endometriosis     ASCUS with positive high risk HPV cervical     Past Surgical History:   Procedure Laterality Date     D & C      elected AB     HC TOOTH EXTRACTION W/FORCEP       LAPAROSCOPY DIAGNOSTIC (GYN)  2008    endo     LAPAROSCOPY DIAGNOSTIC (GYN)  2008    endo      "TONSILLECTOMY & ADENOIDECTOMY        Social History     Tobacco Use     Smoking status: Never Smoker     Smokeless tobacco: Never Used   Substance Use Topics     Alcohol use: Yes     Comment: Occas       Problem (# of Occurrences) Relation (Name,Age of Onset)    Diabetes (2) Father, Paternal Aunt    Hyperlipidemia (1) Mother            Current Outpatient Medications   Medication Sig     naproxen (NAPROSYN) 500 MG tablet Take 1 tablet (500 mg) by mouth 2 times daily as needed for moderate pain     No current facility-administered medications for this visit.      Allergies   Allergen Reactions     Dust Mite Extract      STUFFY NOSE     Minocycline Unknown and Nausea     Nuts Rash     NOSE STUFFY   EYES ITCHY       Past medical, surgical, social and family histories were reviewed and updated in EPIC.    ROS:   12 point review of systems negative other than symptoms noted below.    EXAM:  /78   Pulse 74   Ht 1.715 m (5' 7.5\")   Wt 93.8 kg (206 lb 12.8 oz)   LMP 04/20/2019 (Exact Date)   Breastfeeding? No   BMI 31.91 kg/m     BMI: Body mass index is 31.91 kg/m .    PHYSICAL EXAM:  Constitutional:  Appearance: Well nourished, well developed, alert, in no acute distress  Neck:  Lymph Nodes:  No lymphadenopathy present    Thyroid:  Gland size normal, nontender, no nodules or masses present  on palpation  Chest:  Respiratory Effort:  Breathing unlabored  Cardiovascular:    Heart: Auscultation:  Regular rate, normal rhythm, no murmurs present  Breasts: Inspection of Breasts:  No lymphadenopathy present., Palpation of Breasts and Axillae:  No masses present on palpation, no breast tenderness., Axillary Lymph Nodes:  No lymphadenopathy present. and No nodularity, asymmetry or nipple discharge bilaterally.  Gastrointestinal:   Abdominal Examination:  Abdomen nontender to palpation, tone normal without rigidity or guarding, no masses present, umbilicus without lesions   Liver and Spleen:  No hepatomegaly present, " liver nontender to palpation    Hernias:  No hernias present  Lymphatic: Lymph Nodes:  No other lymphadenopathy present  Skin:  General Inspection:  No rashes present, no lesions present, no areas of  discoloration    Genitalia and Groin:  No rashes present, no lesions present, no areas of  discoloration, no masses present  Neurologic/Psychiatric:    Mental Status:  Oriented X3     Pelvic Exam:  External Genitalia:     Normal appearance for age, no discharge present, no tenderness present, no inflammatory lesions present, color normal  Vagina:     Normal vaginal vault without central or paravaginal defects, no discharge present, no inflammatory lesions present, no masses present  Bladder:     Nontender to palpation  Urethra:   Urethral Body:  Urethra palpation normal, urethra structural support normal   Urethral Meatus:  No erythema or lesions present  Cervix:     Appearance healthy, no lesions present, nontender to palpation, no bleeding present  Uterus:     Uterus: firm, normal sized and nontender, anteverted in position.   Adnexa:     No adnexal tenderness present, no adnexal masses present  Perineum:     Perineum within normal limits, no evidence of trauma, no rashes or skin lesions present  Anus:     Anus within normal limits, no hemorrhoids present  Inguinal Lymph Nodes:     No lymphadenopathy present  Pubic Hair:     Normal pubic hair distribution for age  Genitalia and Groin:     No rashes present, no lesions present, no areas of discoloration, no masses present      COUNSELING:   Reviewed preventive health counseling, as reflected in patient instructions       Regular exercise       Healthy diet/nutrition       Contraception    BMI: Body mass index is 31.91 kg/m .  Weight management plan: Discussed healthy diet and exercise guidelines    ASSESSMENT:  38 year old female with satisfactory annual exam.    ICD-10-CM    1. Encounter for gynecological examination without abnormal finding Z01.419 Pap imaged thin  layer screen with HPV - recommended age 30 - 65     HPV High Risk Types DNA Cervical   2. Dysmenorrhea N94.6 naproxen (NAPROSYN) 500 MG tablet       PLAN:  Normal Gyn exam.  Will try anaprox for cramps.  Return prn or 1 year for annual exam.    SANNA Renee CNP

## 2019-04-29 ENCOUNTER — OFFICE VISIT (OUTPATIENT)
Dept: OBGYN | Facility: CLINIC | Age: 39
End: 2019-04-29
Payer: COMMERCIAL

## 2019-04-29 VITALS
HEIGHT: 68 IN | DIASTOLIC BLOOD PRESSURE: 78 MMHG | HEART RATE: 74 BPM | BODY MASS INDEX: 31.34 KG/M2 | SYSTOLIC BLOOD PRESSURE: 114 MMHG | WEIGHT: 206.8 LBS

## 2019-04-29 DIAGNOSIS — N94.6 DYSMENORRHEA: ICD-10-CM

## 2019-04-29 DIAGNOSIS — Z01.419 ENCOUNTER FOR GYNECOLOGICAL EXAMINATION WITHOUT ABNORMAL FINDING: Primary | ICD-10-CM

## 2019-04-29 PROCEDURE — 87624 HPV HI-RISK TYP POOLED RSLT: CPT | Performed by: NURSE PRACTITIONER

## 2019-04-29 PROCEDURE — 88141 CYTOPATH C/V INTERPRET: CPT | Performed by: NURSE PRACTITIONER

## 2019-04-29 PROCEDURE — 99395 PREV VISIT EST AGE 18-39: CPT | Performed by: NURSE PRACTITIONER

## 2019-04-29 PROCEDURE — 88175 CYTOPATH C/V AUTO FLUID REDO: CPT | Performed by: NURSE PRACTITIONER

## 2019-04-29 RX ORDER — NAPROXEN 500 MG/1
500 TABLET ORAL 2 TIMES DAILY PRN
Qty: 30 TABLET | Refills: 1 | Status: SHIPPED | OUTPATIENT
Start: 2019-04-29 | End: 2020-06-03

## 2019-04-29 ASSESSMENT — ANXIETY QUESTIONNAIRES
GAD7 TOTAL SCORE: 0
7. FEELING AFRAID AS IF SOMETHING AWFUL MIGHT HAPPEN: NOT AT ALL
1. FEELING NERVOUS, ANXIOUS, OR ON EDGE: NOT AT ALL
3. WORRYING TOO MUCH ABOUT DIFFERENT THINGS: NOT AT ALL
IF YOU CHECKED OFF ANY PROBLEMS ON THIS QUESTIONNAIRE, HOW DIFFICULT HAVE THESE PROBLEMS MADE IT FOR YOU TO DO YOUR WORK, TAKE CARE OF THINGS AT HOME, OR GET ALONG WITH OTHER PEOPLE: NOT DIFFICULT AT ALL
5. BEING SO RESTLESS THAT IT IS HARD TO SIT STILL: NOT AT ALL
2. NOT BEING ABLE TO STOP OR CONTROL WORRYING: NOT AT ALL
6. BECOMING EASILY ANNOYED OR IRRITABLE: NOT AT ALL

## 2019-04-29 ASSESSMENT — MIFFLIN-ST. JEOR: SCORE: 1668.6

## 2019-04-29 ASSESSMENT — PATIENT HEALTH QUESTIONNAIRE - PHQ9
5. POOR APPETITE OR OVEREATING: NOT AT ALL
SUM OF ALL RESPONSES TO PHQ QUESTIONS 1-9: 1

## 2019-04-30 ASSESSMENT — ANXIETY QUESTIONNAIRES: GAD7 TOTAL SCORE: 0

## 2019-05-03 LAB
COPATH REPORT: NORMAL
PAP: NORMAL

## 2019-09-30 ENCOUNTER — HEALTH MAINTENANCE LETTER (OUTPATIENT)
Age: 39
End: 2019-09-30

## 2020-06-03 ENCOUNTER — TELEPHONE (OUTPATIENT)
Dept: OBGYN | Facility: CLINIC | Age: 40
End: 2020-06-03

## 2020-06-03 ENCOUNTER — OFFICE VISIT (OUTPATIENT)
Dept: OBGYN | Facility: CLINIC | Age: 40
End: 2020-06-03
Payer: COMMERCIAL

## 2020-06-03 VITALS
HEIGHT: 68 IN | HEART RATE: 76 BPM | BODY MASS INDEX: 32.89 KG/M2 | WEIGHT: 217 LBS | SYSTOLIC BLOOD PRESSURE: 124 MMHG | DIASTOLIC BLOOD PRESSURE: 72 MMHG

## 2020-06-03 DIAGNOSIS — N83.202 LEFT OVARIAN CYST: Primary | ICD-10-CM

## 2020-06-03 PROCEDURE — 99214 OFFICE O/P EST MOD 30 MIN: CPT | Performed by: OBSTETRICS & GYNECOLOGY

## 2020-06-03 ASSESSMENT — MIFFLIN-ST. JEOR: SCORE: 1709.87

## 2020-06-03 NOTE — TELEPHONE ENCOUNTER
Reason for call:  Other   Patient called regarding (reason for call): appointment  Additional comments: Per pt: having abdominal pain from endometriosis. Want to know if she should wait until Monday or if Dr Urias can see her sooner     Phone number to reach patient:  Home number on file [phone] (home)    Best Time:  any    Can we leave a detailed message on this number?  YES    Travel screening: Negative

## 2020-06-03 NOTE — PROGRESS NOTES
SUBJECTIVE:                                                   Shayy Ndiaye is a 39 year old female who presents to clinic today for the following health issue(s):  Patient presents with:  Ultrasound: f/u to left ovarian cyst      HPI: Patient is a 39-year-old with a past history of endometriosis and pelvic adhesive disease.  She has had pain over the last few cycles that is progressive.  She has now been very constipated for the last week and a half.  Ultrasound is performed today.  She is still cycling.      Patient's last menstrual period was 2020 (exact date)..     Patient is sexually active, .  Using none for contraception.    reports that she has never smoked. She has never used smokeless tobacco.    STD testing offered?  Declined    Health maintenance updated:  yes    Problem list and histories reviewed & adjusted, as indicated.  Additional history: as documented.    Patient Active Problem List   Diagnosis     Endometriosis     ASCUS with positive high risk HPV cervical     Past Surgical History:   Procedure Laterality Date     D & C      elected AB     HC TOOTH EXTRACTION W/FORCEP       LAPAROSCOPY DIAGNOSTIC (GYN)  2008    endo     LAPAROSCOPY DIAGNOSTIC (GYN)  2008    endo     TONSILLECTOMY & ADENOIDECTOMY        Social History     Tobacco Use     Smoking status: Never Smoker     Smokeless tobacco: Never Used   Substance Use Topics     Alcohol use: Yes     Comment: Occas       Problem (# of Occurrences) Relation (Name,Age of Onset)    Diabetes (2) Father, Paternal Aunt    Hyperlipidemia (1) Mother    No Known Problems (6) Sister, Brother, Maternal Grandmother, Maternal Grandfather, Paternal Grandmother, Other            Current Outpatient Medications   Medication Sig     MAGNESIUM CITRATE PO      Naproxen Sodium (ALEVE PO)      No current facility-administered medications for this visit.      Allergies   Allergen Reactions     Dust Mite Extract      STUFFY NOSE     Erythromycin   "    Minocycline Unknown and Nausea       ROS:  12 point review of systems negative other than symptoms noted below or in the HPI.  Gastrointestinal: GI changes, pain during bowel movements  Genitourinary: Painful [place], Pelvic Pain and Significant PMS  No urinary frequency or dysuria, bladder or kidney problems      OBJECTIVE:     /72   Pulse 76   Ht 1.715 m (5' 7.5\")   Wt 98.4 kg (217 lb)   LMP 05/29/2020 (Exact Date)   BMI 33.49 kg/m    Body mass index is 33.49 kg/m .    Exam:  Constitutional:  Appearance: Well nourished, well developed alert, in no acute distress  Skin: General Inspection:  No rashes present, no lesions present, no areas of discoloration.  Neurologic:  Mental Status:  Oriented X3.  Normal strength and tone, sensory exam grossly normal, mentation intact and speech normal.    Psychiatric:  Mentation appears normal and affect normal/bright.  No Pelvic Exam performed     In-Clinic Test Results:  Results for orders placed or performed in visit on 06/08/20 (from the past 24 hour(s))   US Transvaginal Non OB    Narrative    US Transvaginal Non OB   Order #: [id number] Accession #: [id number]   Study Notes      Zoe Tello on 6/8/2020 12:57 PM       Gynecological Ultrasound Report  Pelvic U/S - Transvaginal  [facility]  Referring Provider: Mino Urias MD  Sonographer:  Zoe Tello RDMS  Indication: Endometriosis, pelvic pain  LMP: 05/29/2020     Gynecological Ultrasonography:   Uterus: anteverted. Contour is smooth/regular.  Size: 7.57 x 4.43 x 2.81 cm  Endometrium: Thickness Total 9.07 mm  Findings: echogenic area in endometrial canal, suspicious for 4 mm polyp  Right Ovary: 3.37 x 2.29 x 2.29 cm. Wnl  Left Ovary: 5.21 x 3.48 x 3.50 cm. Complex cyst 2.26 x 1.73 x 1.94 cm -   left ovary appears to be adhered to lateral high uterus  Cul de Sac Free Fluid: Mild to moderate     Impression: Possible endometrial polyp, complex left ovarian " cyst     SONOGRAPHER NOTES TO READING PROVIDER:   Left ovary appears to be adhered   to uterus. Left complex cyst. Possible small polyp in endo cavity. Simple   right ovarian follicle < 2cm. Mild-moderate free fluid           Discussed in office       ASSESSMENT/PLAN:                                                      39-year-old with progressive pelvic pain and probable recurrence of endometriosis with a complex left ovarian cyst and adhesions on that side.  She also has thickened tissue with possible polyps in the endometrial cavity.  She is very constipated at this time and we have asked her to try to cure that issue with some MiraLAX.  If her pain persists she is amenable to a hysteroscopy D&C and laparoscopy with possible left salpingo-oophorectomy            Mino Urias MD  [facility] [place]

## 2020-06-03 NOTE — PROGRESS NOTES
SUBJECTIVE:                                                   Shayy Ndiaye is a 39 year old female who presents to clinic today for the following health issue(s):  Patient presents with:  Consult: here to talk about endometriosis, feels like she is going down the same road pre-surgery years ago.      HPI: The patient is a 39-year-old with a past history of endometriosis.  She had 2 surgeries in .  She was treated with Lupron and 5 years of birth control suppression.  She did fairly well after that.  She has had one abnormal Pap smear in the past with follow-up.  She is unsure if her insurance will cover a Pap smear today.  She complains of pelvic pain left greater than right and pain with bowel movements.  She denies any fever or chills.      No LMP recorded..     Patient is sexually active, .  Using none for contraception.    reports that she has never smoked. She has never used smokeless tobacco.    STD testing offered?  Declined    Health maintenance updated:  no, will come back for yearly in July    Today's PHQ-2 Score:   PHQ-2 (  [employer]) 6/3/2020   Q1: Little interest or pleasure in doing things 0   Q2: Feeling down, depressed or hopeless 0   PHQ-2 Score 0     Today's PHQ-9 Score:   PHQ-9 SCORE 2019   PHQ-9 Total Score 1     Today's PAYAM-7 Score:   PAYAM-7 SCORE 2019   Total Score 0       Problem list and histories reviewed & adjusted, as indicated.  Additional history: as documented.    Patient Active Problem List   Diagnosis     Endometriosis     ASCUS with positive high risk HPV cervical     Past Surgical History:   Procedure Laterality Date     D & C      elected AB     HC TOOTH EXTRACTION W/FORCEP       LAPAROSCOPY DIAGNOSTIC (GYN)  2008    endo     LAPAROSCOPY DIAGNOSTIC (GYN)  2008    endo     TONSILLECTOMY & ADENOIDECTOMY        Social History     Tobacco Use     Smoking status: Never Smoker     Smokeless tobacco: Never Used   Substance Use Topics     Alcohol use: Yes  "    Comment: Occas       Problem (# of Occurrences) Relation (Name,Age of Onset)    Diabetes (2) Father, Paternal Aunt    Hyperlipidemia (1) Mother            Current Outpatient Medications   Medication Sig     MAGNESIUM CITRATE PO      Naproxen Sodium (ALEVE PO)      No current facility-administered medications for this visit.      Allergies   Allergen Reactions     Dust Mite Extract      STUFFY NOSE     Erythromycin      Minocycline Unknown and Nausea     Nuts Rash     NOSE STUFFY   EYES ITCHY       ROS:  12 point review of systems negative other than symptoms noted below or in the HPI.  Gastrointestinal: BM changes  Genitourinary: Painful [employer], Pelvic Pain and Significant PMS  No urinary frequency or dysuria, bladder or kidney problems      OBJECTIVE:     /72   Pulse 76   Ht 1.715 m (5' 7.5\")   Wt 98.4 kg (217 lb)   BMI 33.49 kg/m    Body mass index is 33.49 kg/m .    Exam:  Constitutional:  Appearance: Well nourished, well developed alert, in no acute distress  Gastrointestinal:  Abdominal Examination:  Abdomen nontender to palpation, tone normal without rigidity or guarding, no masses present, umbilicus without lesions; Liver/Spleen:  No hepatomegaly present, liver nontender to palpation; Hernias:  No hernias present  Lymphatic: Lymph Nodes:  No other lymphadenopathy present  Skin: General Inspection:  No rashes present, no lesions present, no areas of discoloration.  Neurologic:  Mental Status:  Oriented X3.  Normal strength and tone, sensory exam grossly normal, mentation intact and speech normal.    Psychiatric:  Mentation appears normal and affect normal/bright.  Pelvic Exam:  External Genitalia:     Normal appearance for age, no discharge present, no tenderness present, no inflammatory lesions present, color normal  Vagina:     Normal vaginal vault without central or paravaginal defects, no discharge present, no inflammatory lesions present, no masses present  Bladder:     Nontender to " palpation  Urethra:   Urethral Body:  Urethra palpation normal, urethra structural support normal   Urethral Meatus:  No erythema or lesions present  Cervix:     Appearance healthy, no lesions present, nontender to palpation, no bleeding present  Uterus:     Uterus: firm, normal sized and nontender, midplane in position.   Adnexa:     Left adnexal tenderness present, fullness to left  Perineum:     Perineum within normal limits, no evidence of trauma, no rashes or skin lesions present  Anus:     Anus within normal limits, no hemorrhoids present  Inguinal Lymph Nodes:     No lymphadenopathy present  Pubic Hair:     Normal pubic hair distribution for age  Genitalia and Groin:     No rashes present, no lesions present, no areas of discoloration, no masses present       In-Clinic Test Results:      ASSESSMENT/PLAN:                                                      39-year-old with past history of endometriosis with recurrent pelvic pain at this time.  She is debilitated by this.  Fullness on the left and tenderness.  We will order a vaginal probe ultrasound and respond accordingly.I            Mino Urias MD  [facility] [place]

## 2020-06-08 ENCOUNTER — ANCILLARY PROCEDURE (OUTPATIENT)
Dept: ULTRASOUND IMAGING | Facility: CLINIC | Age: 40
End: 2020-06-08
Attending: OBSTETRICS & GYNECOLOGY
Payer: COMMERCIAL

## 2020-06-08 ENCOUNTER — OFFICE VISIT (OUTPATIENT)
Dept: OBGYN | Facility: CLINIC | Age: 40
End: 2020-06-08
Attending: OBSTETRICS & GYNECOLOGY
Payer: COMMERCIAL

## 2020-06-08 VITALS
WEIGHT: 217 LBS | SYSTOLIC BLOOD PRESSURE: 124 MMHG | DIASTOLIC BLOOD PRESSURE: 72 MMHG | HEIGHT: 68 IN | BODY MASS INDEX: 32.89 KG/M2 | HEART RATE: 76 BPM

## 2020-06-08 DIAGNOSIS — N83.202 LEFT OVARIAN CYST: Primary | ICD-10-CM

## 2020-06-08 DIAGNOSIS — N83.202 LEFT OVARIAN CYST: ICD-10-CM

## 2020-06-08 PROCEDURE — 99213 OFFICE O/P EST LOW 20 MIN: CPT | Performed by: OBSTETRICS & GYNECOLOGY

## 2020-06-08 PROCEDURE — 76830 TRANSVAGINAL US NON-OB: CPT | Performed by: OBSTETRICS & GYNECOLOGY

## 2020-06-08 ASSESSMENT — MIFFLIN-ST. JEOR: SCORE: 1709.87

## 2020-09-21 ENCOUNTER — TELEPHONE (OUTPATIENT)
Dept: OBGYN | Facility: CLINIC | Age: 40
End: 2020-09-21

## 2020-09-21 ENCOUNTER — PREP FOR PROCEDURE (OUTPATIENT)
Dept: OBGYN | Facility: CLINIC | Age: 40
End: 2020-09-21

## 2020-09-21 DIAGNOSIS — N83.292 COMPLEX CYST OF LEFT OVARY: ICD-10-CM

## 2020-09-21 DIAGNOSIS — N84.0 ENDOMETRIAL POLYP: Primary | ICD-10-CM

## 2020-09-21 DIAGNOSIS — Z11.59 ENCOUNTER FOR SCREENING FOR OTHER VIRAL DISEASES: Primary | ICD-10-CM

## 2020-09-21 NOTE — TELEPHONE ENCOUNTER
Type of surgery: [facility] D&C LSC LSO C02 LASER  Location of surgery: [facility] OR  Date and time of surgery: 10/6/2020 10a  Surgeon: RENEE  Pre-Op Appt Date: 9/23/2020  Post-Op Appt Date: TBD   Packet sent out: AT  TO  AT APPNT 9/23  Pre-cert/Authorization completed:  TBD.  Date: 9/21/2020 Nabeel dailey/Kathleen HERNANDEZ 10/3/2020 11:30 ANDTOR Lai  Surgery Scheduler    DX

## 2020-09-22 NOTE — PROGRESS NOTES
SUBJECTIVE:                                                   Shayy Ndiaye is a 40 year old female who presents to clinic today for the following health issue(s):  Patient presents with:  Pre-Op Exam: 10/9/20: HYSTEROSCOPY, DIAGNOSTIC, WITH DILATION AND CURETTAGE OF UTERUS, LAPAROSCOPY with CO2 laser, left salpingo-oophorectomy          HPI: Patient seen at this time for preoperative clearance for an upcoming combined hysteroscopy and laparoscopy.  She has a past history of endometriosis.  She has mostly bowel symptomatology with worsening chronic constipation.  She is asymptomatic at this time.  She fully understands the ramifications of this procedure.      Patient's last menstrual period was 2020 (exact date).    Patient is not currently sexually active, .  Using none for contraception.    reports that she has never smoked. She has never used smokeless tobacco.    STD testing offered?  Declined    Health maintenance updated: no - needs pap    Today's PHQ-2 Score:   PHQ-2 (  [employer]) 6/3/2020   Q1: Little interest or pleasure in doing things 0   Q2: Feeling down, depressed or hopeless 0   PHQ-2 Score 0     Today's PHQ-9 Score:   PHQ-9 SCORE 2019   PHQ-9 Total Score 1     Today's PAYAM-7 Score:   PAYAM-7 SCORE 2019   Total Score 0       Problem list and histories reviewed & adjusted, as indicated.  Additional history: as documented.    Patient Active Problem List   Diagnosis     Endometriosis     ASCUS with positive high risk HPV cervical     Endometrial polyp     Complex cyst of left ovary     Past Surgical History:   Procedure Laterality Date     D & C      elected AB     HC TOOTH EXTRACTION W/FORCEP       LAPAROSCOPY DIAGNOSTIC (GYN)  2008    endo     LAPAROSCOPY DIAGNOSTIC (GYN)  2008    endo     TONSILLECTOMY & ADENOIDECTOMY        Social History     Tobacco Use     Smoking status: Never Smoker     Smokeless tobacco: Never Used   Substance Use Topics     Alcohol use: Yes      "Comment: Occas       Problem (# of Occurrences) Relation (Name,Age of Onset)    Diabetes (2) Father, Paternal Aunt    Hyperlipidemia (1) Mother    No Known Problems (6) Sister, Brother, Maternal Grandmother, Maternal Grandfather, Paternal Grandmother, Other            Current Outpatient Medications   Medication Sig     MAGNESIUM CITRATE PO      Naproxen Sodium (ALEVE PO)      No current facility-administered medications for this visit.      Allergies   Allergen Reactions     Dust Mite Extract      STUFFY NOSE     Erythromycin      Minocycline Unknown and Nausea       ROS:  12 point review of systems negative other than symptoms noted below or in the HPI.  No urinary frequency or dysuria, bladder or kidney problems      OBJECTIVE:     Ht 1.715 m (5' 7.5\")   Wt 98.5 kg (217 lb 3.2 oz)   LMP 09/11/2020 (Exact Date)   BMI 33.52 kg/m    Body mass index is 33.52 kg/m .    Exam:  Constitutional:  Appearance: Well nourished, well developed alert, in no acute distress  Neck:  Lymph Nodes:  No lymphadenopathy present; Thyroid:  Gland size normal, nontender, no nodules or masses present on palpation  Chest:  Respiratory Effort:  Breathing unlabored. Clear to auscultation bilaterally.   Cardiovascular: Heart: Auscultation:  Regular rate, normal rhythm, no murmurs present  Gastrointestinal:  Abdominal Examination:  Abdomen nontender to palpation, tone normal without rigidity or guarding, no masses present, umbilicus without lesions; Liver/Spleen:  No hepatomegaly present, liver nontender to palpation; Hernias:  No hernias present  Lymphatic: Lymph Nodes:  No other lymphadenopathy present  Skin: General Inspection:  No rashes present, no lesions present, no areas of discoloration.  Neurologic:  Mental Status:  Oriented X3.  Normal strength and tone, sensory exam grossly normal, mentation intact and speech normal.    Psychiatric:  Mentation appears normal and affect normal/bright.  Pelvic Exam:  External Genitalia:     Normal " appearance for age, no discharge present, no tenderness present, no inflammatory lesions present, color normal  Vagina:     Normal vaginal vault without central or paravaginal defects, no discharge present, no inflammatory lesions present, no masses present  Bladder:     Nontender to palpation  Urethra:   Urethral Body:  Urethra palpation normal, urethra structural support normal   Urethral Meatus:  No erythema or lesions present  Cervix:     Appearance healthy, no lesions present, nontender to palpation, no bleeding present  Uterus:     Uterus: firm, normal sized and nontender, midplane in position.   Adnexa:     left adnexal tenderness present, no adnexal masses present  Perineum:     Perineum within normal limits, no evidence of trauma, no rashes or skin lesions present  Anus:     Anus within normal limits, no hemorrhoids present  Inguinal Lymph Nodes:     No lymphadenopathy present  Pubic Hair:     Normal pubic hair distribution for age  Genitalia and Groin:     No rashes present, no lesions present, no areas of discoloration, no masses present       In-Clinic Test Results:      ASSESSMENT/PLAN:                                                        The patient is cleared for general anesthesia.  We anticipate a hysteroscopy D&C laparoscopy and left salpingo-oophorectomy.  She fully understands the risk and complications.  She knows the complexity of the surgery and the possible necessity to move to an open procedure if there is extensive bowel involvement at with adhesions.  She is prepared for the surgery.  Pap is pending at this time.          Mino Urias MD  [facility] [place]

## 2020-09-23 ENCOUNTER — OFFICE VISIT (OUTPATIENT)
Dept: OBGYN | Facility: CLINIC | Age: 40
End: 2020-09-23
Payer: COMMERCIAL

## 2020-09-23 VITALS
HEART RATE: 78 BPM | SYSTOLIC BLOOD PRESSURE: 102 MMHG | DIASTOLIC BLOOD PRESSURE: 68 MMHG | HEIGHT: 68 IN | WEIGHT: 217.2 LBS | BODY MASS INDEX: 32.92 KG/M2

## 2020-09-23 DIAGNOSIS — R87.810 CERVICAL HIGH RISK HPV (HUMAN PAPILLOMAVIRUS) TEST POSITIVE: ICD-10-CM

## 2020-09-23 DIAGNOSIS — Z12.4 SCREENING FOR CERVICAL CANCER: Primary | ICD-10-CM

## 2020-09-23 DIAGNOSIS — N80.9 ENDOMETRIOSIS: ICD-10-CM

## 2020-09-23 PROCEDURE — 88175 CYTOPATH C/V AUTO FLUID REDO: CPT | Performed by: OBSTETRICS & GYNECOLOGY

## 2020-09-23 PROCEDURE — 87624 HPV HI-RISK TYP POOLED RSLT: CPT | Performed by: OBSTETRICS & GYNECOLOGY

## 2020-09-23 PROCEDURE — 99214 OFFICE O/P EST MOD 30 MIN: CPT | Performed by: OBSTETRICS & GYNECOLOGY

## 2020-09-23 ASSESSMENT — MIFFLIN-ST. JEOR: SCORE: 1705.77

## 2020-09-24 NOTE — TELEPHONE ENCOUNTER
Per Dr Urias remove LSO   Spoke w/Kathleen at [facility] at the [facility] to make changes    Roseann Lai  Surgery Scheduler

## 2020-09-28 LAB
COPATH REPORT: NORMAL
PAP: NORMAL

## 2020-09-29 LAB
FINAL DIAGNOSIS: NORMAL
HPV HR 12 DNA CVX QL NAA+PROBE: NEGATIVE
HPV16 DNA SPEC QL NAA+PROBE: NEGATIVE
HPV18 DNA SPEC QL NAA+PROBE: NEGATIVE
SPECIMEN DESCRIPTION: NORMAL
SPECIMEN SOURCE CVX/VAG CYTO: NORMAL

## 2020-10-01 ENCOUNTER — PATIENT OUTREACH (OUTPATIENT)
Dept: OBGYN | Facility: CLINIC | Age: 40
End: 2020-10-01

## 2020-10-01 NOTE — TELEPHONE ENCOUNTER
7/23/15 NIL pap  6/8/18 ASCUS pap/+ HR HPV (not 16 or 18).  Plan: colposcopy by 9/8/18 8/13/18 Colpo: visually normal.  No bx.  Dx ASCUS pap, + HR HPV (not 16 or 18).  Plan: cotest in 1 year  4/29/19 NIL pap, + HR HPV (not 16/18).  Plan: cotest in 1 year.  CCT tracking.   9/23/20 NIL, Neg HPV. Plan 1 yr co-test

## 2020-10-03 DIAGNOSIS — Z11.59 ENCOUNTER FOR SCREENING FOR OTHER VIRAL DISEASES: ICD-10-CM

## 2020-10-03 PROCEDURE — U0003 INFECTIOUS AGENT DETECTION BY NUCLEIC ACID (DNA OR RNA); SEVERE ACUTE RESPIRATORY SYNDROME CORONAVIRUS 2 (SARS-COV-2) (CORONAVIRUS DISEASE [COVID-19]), AMPLIFIED PROBE TECHNIQUE, MAKING USE OF HIGH THROUGHPUT TECHNOLOGIES AS DESCRIBED BY CMS-2020-01-R: HCPCS | Performed by: OBSTETRICS & GYNECOLOGY

## 2020-10-04 LAB
SARS-COV-2 RNA SPEC QL NAA+PROBE: NOT DETECTED
SPECIMEN SOURCE: NORMAL

## 2020-10-05 NOTE — H&P
Ginna Whitten RN BSN, Pap Tracking      Progress Notes  Mino Urias MD (Physician)     OB/Gyn        SUBJECTIVE:                                                   Shayy Ndiaye is a 40 year old female who presents to clinic today for the following health issue(s):  Patient presents with:  Pre-Op Exam: 10/9/20: HYSTEROSCOPY, DIAGNOSTIC, WITH DILATION AND CURETTAGE OF UTERUS, LAPAROSCOPY with CO2 laser, left salpingo-oophorectomy              HPI: Patient seen at this time for preoperative clearance for an upcoming combined hysteroscopy and laparoscopy.  She has a past history of endometriosis.  She has mostly bowel symptomatology with worsening chronic constipation.  She is asymptomatic at this time.  She fully understands the ramifications of this procedure.        Patient's last menstrual period was 2020 (exact date).     Patient is not currently sexually active, .  Using none for contraception.    reports that she has never smoked. She has never used smokeless tobacco.     STD testing offered?  Declined     Health maintenance updated: no - needs pap     Today's PHQ-2 Score:   PHQ-2 (  [employer]) 6/3/2020   Q1: Little interest or pleasure in doing things 0   Q2: Feeling down, depressed or hopeless 0   PHQ-2 Score 0      Today's PHQ-9 Score:   PHQ-9 SCORE 2019   PHQ-9 Total Score 1      Today's PAYAM-7 Score:   PAYAM-7 SCORE 2019   Total Score 0         Problem list and histories reviewed & adjusted, as indicated.  Additional history: as documented.         Patient Active Problem List   Diagnosis     Endometriosis     ASCUS with positive high risk HPV cervical     Endometrial polyp     Complex cyst of left ovary             Past Surgical History:   Procedure Laterality Date     D & C        elected AB     HC TOOTH EXTRACTION W/FORCEP         LAPAROSCOPY DIAGNOSTIC (GYN)   2008     endo     LAPAROSCOPY DIAGNOSTIC (GYN)   2008     endo     TONSILLECTOMY & ADENOIDECTOMY      "      Social History            Tobacco Use     Smoking status: Never Smoker     Smokeless tobacco: Never Used   Substance Use Topics     Alcohol use: Yes       Comment: Occas        Problem (# of Occurrences) Relation (Name,Age of Onset)     Diabetes (2) Father, Paternal Aunt     Hyperlipidemia (1) Mother     No Known Problems (6) Sister, Brother, Maternal Grandmother, Maternal Grandfather, Paternal Grandmother, Other                   Current Outpatient Medications   Medication Sig     MAGNESIUM CITRATE PO       Naproxen Sodium (ALEVE PO)        No current facility-administered medications for this visit.             Allergies   Allergen Reactions     Dust Mite Extract         STUFFY NOSE     Erythromycin       Minocycline Unknown and Nausea         ROS:  12 point review of systems negative other than symptoms noted below or in the HPI.  No urinary frequency or dysuria, bladder or kidney problems        OBJECTIVE:      Ht 1.715 m (5' 7.5\")   Wt 98.5 kg (217 lb 3.2 oz)   LMP 09/11/2020 (Exact Date)   BMI 33.52 kg/m    Body mass index is 33.52 kg/m .     Exam:  Constitutional:  Appearance: Well nourished, well developed alert, in no acute distress  Neck:  Lymph Nodes:  No lymphadenopathy present; Thyroid:  Gland size normal, nontender, no nodules or masses present on palpation  Chest:  Respiratory Effort:  Breathing unlabored. Clear to auscultation bilaterally.   Cardiovascular: Heart: Auscultation:  Regular rate, normal rhythm, no murmurs present  Gastrointestinal:  Abdominal Examination:  Abdomen nontender to palpation, tone normal without rigidity or guarding, no masses present, umbilicus without lesions; Liver/Spleen:  No hepatomegaly present, liver nontender to palpation; Hernias:  No hernias present  Lymphatic: Lymph Nodes:  No other lymphadenopathy present  Skin: General Inspection:  No rashes present, no lesions present, no areas of discoloration.  Neurologic:  Mental Status:  Oriented X3.  Normal " strength and tone, sensory exam grossly normal, mentation intact and speech normal.    Psychiatric:  Mentation appears normal and affect normal/bright.  Pelvic Exam:  External Genitalia:                Normal appearance for age, no discharge present, no tenderness present, no inflammatory lesions present, color normal  Vagina:                Normal vaginal vault without central or paravaginal defects, no discharge present, no inflammatory lesions present, no masses present  Bladder:                Nontender to palpation  Urethra:              Urethral Body:  Urethra palpation normal, urethra structural support normal              Urethral Meatus:  No erythema or lesions present  Cervix:                Appearance healthy, no lesions present, nontender to palpation, no bleeding present  Uterus:                Uterus: firm, normal sized and nontender, midplane in position.   Adnexa:                left adnexal tenderness present, no adnexal masses present  Perineum:                Perineum within normal limits, no evidence of trauma, no rashes or skin lesions present  Anus:                Anus within normal limits, no hemorrhoids present  Inguinal Lymph Nodes:                No lymphadenopathy present  Pubic Hair:                Normal pubic hair distribution for age  Genitalia and Groin:                No rashes present, no lesions present, no areas of discoloration, no masses present        In-Clinic Test Results:        ASSESSMENT/PLAN:                                                          The patient is cleared for general anesthesia.  We anticipate a hysteroscopy D&C laparoscopy and left salpingo-oophorectomy.  She fully understands the risk and complications.  She knows the complexity of the surgery and the possible necessity to move to an open procedure if there is extensive bowel involvement at with adhesions.  She is prepared for the surgery.  Pap is pending at this time.              Mino Urias,  "MD  [facility]      Instructions     After Visit Summary (Automatic SnapShot taken 9/28/2020)  Additional Documentation    Vitals:    /68   Pulse 78   Ht 1.715 m (5' 7.5\")   Wt 98.5 kg (217 lb 3.2 oz)   LMP 09/11/2020 (Exact Date)   BMI 33.52 kg/m    BSA 2.17 m       More Vitals   Flowsheets:    NICU VS,   Anthropometrics,   Vitals Reassessment      Encounter Info:    Billing Info,   History,   Allergies,   Detailed Report      AVS Reports    Date/Time Report Action User   9/28/2020 12:54 PM After Visit Summary Automatically Generated Mino Urias MD   9/24/2020  8:22 AM After Visit Summary Automatically Generated Mino Urias MD   Encounter Information     Provider Department Encounter # Center   9/23/2020 9:30 AM Mino Urias MD [facility] [id number] [facility]   Reviewed this Encounter     Medications Problems Allergies History   Mino Urias MD Morgan, Anna O, LPN   Reviewed Alcohol, Drug Use, Family, Medical, Obstetric, Sexual Activity, Surgical, Tobacco   Communicable/Travel screen    Communicable/Travel Screening 6/3/2020 6/3/2020 6/8/2020 9/23/2020 10/3/2020   In the last month, have you been in contact with someone who was confirmed or suspected to have Coronavirus / COVID-19? No / Unsure No / Unsure No / Unsure No / Unsure No / Unsure   Do you have any of the following symptoms? None of these None of these None of these None of these None of these   Have you traveled internationally in the last month? No No No No No   View Complete Flowsheet ...More Data Exists   Linked Episodes     Pap Tracking Noted 6/8/2018     Orders Placed       HPV High Risk Types DNA Cervical     Pap imaged thin layer screen with HPV - recommended age 30 - 65 years (select HPV order below)     All Encounter Results   Medication Changes       None     Medication List   Visit Diagnoses       Screening for cervical cancer     Cervical high risk HPV (human papillomavirus) test " positive     Endometriosis     Problem List

## 2020-10-05 NOTE — TELEPHONE ENCOUNTER
Patient calling stating her period started today. Reviewed tomorrows case with Dr. Urias and she is fine to still have surgery. Patient notified on PHI.

## 2020-10-06 ENCOUNTER — HOSPITAL ENCOUNTER (OUTPATIENT)
Facility: CLINIC | Age: 40
Discharge: HOME | End: 2020-10-06
Attending: OBSTETRICS & GYNECOLOGY | Admitting: OBSTETRICS & GYNECOLOGY
Payer: COMMERCIAL

## 2020-10-06 ENCOUNTER — ANESTHESIA EVENT (OUTPATIENT)
Dept: SURGERY | Facility: CLINIC | Age: 40
End: 2020-10-06
Payer: COMMERCIAL

## 2020-10-06 ENCOUNTER — ANESTHESIA (OUTPATIENT)
Dept: SURGERY | Facility: CLINIC | Age: 40
End: 2020-10-06
Payer: COMMERCIAL

## 2020-10-06 VITALS
HEART RATE: 101 BPM | TEMPERATURE: 98.7 F | RESPIRATION RATE: 20 BRPM | DIASTOLIC BLOOD PRESSURE: 89 MMHG | OXYGEN SATURATION: 97 % | SYSTOLIC BLOOD PRESSURE: 128 MMHG | BODY MASS INDEX: 35.36 KG/M2 | WEIGHT: 225.3 LBS | HEIGHT: 67 IN

## 2020-10-06 DIAGNOSIS — N84.0 ENDOMETRIAL POLYP: ICD-10-CM

## 2020-10-06 DIAGNOSIS — N83.292 COMPLEX CYST OF LEFT OVARY: ICD-10-CM

## 2020-10-06 LAB
ABO + RH BLD: NORMAL
ABO + RH BLD: NORMAL
B-HCG SERPL-ACNC: <1 IU/L (ref 0–5)
BLD GP AB SCN SERPL QL: NORMAL
BLOOD BANK CMNT PATIENT-IMP: NORMAL
SPECIMEN EXP DATE BLD: NORMAL

## 2020-10-06 PROCEDURE — 370N000001 HC ANESTHESIA TECHNICAL FEE, 1ST 30 MIN: Performed by: OBSTETRICS & GYNECOLOGY

## 2020-10-06 PROCEDURE — 96372 THER/PROPH/DIAG INJ SC/IM: CPT | Performed by: ANESTHESIOLOGY

## 2020-10-06 PROCEDURE — 258N000001 HC RX 258: Performed by: OBSTETRICS & GYNECOLOGY

## 2020-10-06 PROCEDURE — 999N000138 HC STATISTIC PRE-PROCEDURE ASSESSMENT I: Performed by: OBSTETRICS & GYNECOLOGY

## 2020-10-06 PROCEDURE — 250N000011 HC RX IP 250 OP 636: Performed by: ANESTHESIOLOGY

## 2020-10-06 PROCEDURE — 250N000009 HC RX 250: Performed by: NURSE ANESTHETIST, CERTIFIED REGISTERED

## 2020-10-06 PROCEDURE — 761N000007 HC RECOVERY PHASE 2 EACH 15 MINS: Performed by: OBSTETRICS & GYNECOLOGY

## 2020-10-06 PROCEDURE — 360N000026 HC SURGERY LEVEL 4 1ST 30 MIN: Performed by: OBSTETRICS & GYNECOLOGY

## 2020-10-06 PROCEDURE — 258N000003 HC RX IP 258 OP 636: Performed by: NURSE ANESTHETIST, CERTIFIED REGISTERED

## 2020-10-06 PROCEDURE — 761N000002 HC RECOVERY PHASE 1 LEVEL 1 EA ADDTL HR: Performed by: OBSTETRICS & GYNECOLOGY

## 2020-10-06 PROCEDURE — 88305 TISSUE EXAM BY PATHOLOGIST: CPT | Mod: TC | Performed by: OBSTETRICS & GYNECOLOGY

## 2020-10-06 PROCEDURE — 88305 TISSUE EXAM BY PATHOLOGIST: CPT | Mod: 26 | Performed by: PATHOLOGY

## 2020-10-06 PROCEDURE — 86901 BLOOD TYPING SEROLOGIC RH(D): CPT | Performed by: OBSTETRICS & GYNECOLOGY

## 2020-10-06 PROCEDURE — 86850 RBC ANTIBODY SCREEN: CPT | Performed by: OBSTETRICS & GYNECOLOGY

## 2020-10-06 PROCEDURE — 360N000027 HC SURGERY LEVEL 4 EA 15 ADDTL MIN: Performed by: OBSTETRICS & GYNECOLOGY

## 2020-10-06 PROCEDURE — 36415 COLL VENOUS BLD VENIPUNCTURE: CPT | Performed by: OBSTETRICS & GYNECOLOGY

## 2020-10-06 PROCEDURE — 86900 BLOOD TYPING SEROLOGIC ABO: CPT | Performed by: OBSTETRICS & GYNECOLOGY

## 2020-10-06 PROCEDURE — 84702 CHORIONIC GONADOTROPIN TEST: CPT | Performed by: OBSTETRICS & GYNECOLOGY

## 2020-10-06 PROCEDURE — 370N000002 HC ANESTHESIA TECHNICAL FEE, EACH ADDTL 15 MIN: Performed by: OBSTETRICS & GYNECOLOGY

## 2020-10-06 PROCEDURE — 272N000001 HC OR GENERAL SUPPLY STERILE: Performed by: OBSTETRICS & GYNECOLOGY

## 2020-10-06 PROCEDURE — 250N000009 HC RX 250: Performed by: ANESTHESIOLOGY

## 2020-10-06 PROCEDURE — 250N000011 HC RX IP 250 OP 636: Performed by: OBSTETRICS & GYNECOLOGY

## 2020-10-06 PROCEDURE — 258N000003 HC RX IP 258 OP 636: Performed by: OBSTETRICS & GYNECOLOGY

## 2020-10-06 PROCEDURE — 250N000013 HC RX MED GY IP 250 OP 250 PS 637: Performed by: OBSTETRICS & GYNECOLOGY

## 2020-10-06 PROCEDURE — 761N000001 HC RECOVERY PHASE 1 LEVEL 1 FIRST HR: Performed by: OBSTETRICS & GYNECOLOGY

## 2020-10-06 PROCEDURE — 250N000009 HC RX 250: Performed by: OBSTETRICS & GYNECOLOGY

## 2020-10-06 PROCEDURE — 250N000011 HC RX IP 250 OP 636: Performed by: NURSE ANESTHETIST, CERTIFIED REGISTERED

## 2020-10-06 RX ORDER — ONDANSETRON 2 MG/ML
4 INJECTION INTRAMUSCULAR; INTRAVENOUS EVERY 30 MIN PRN
Status: DISCONTINUED | OUTPATIENT
Start: 2020-10-06 | End: 2020-10-06 | Stop reason: HOSPADM

## 2020-10-06 RX ORDER — SODIUM CHLORIDE, SODIUM LACTATE, POTASSIUM CHLORIDE, CALCIUM CHLORIDE 600; 310; 30; 20 MG/100ML; MG/100ML; MG/100ML; MG/100ML
INJECTION, SOLUTION INTRAVENOUS CONTINUOUS
Status: DISCONTINUED | OUTPATIENT
Start: 2020-10-06 | End: 2020-10-06 | Stop reason: HOSPADM

## 2020-10-06 RX ORDER — HYDROMORPHONE HYDROCHLORIDE 1 MG/ML
.3-.5 INJECTION, SOLUTION INTRAMUSCULAR; INTRAVENOUS; SUBCUTANEOUS EVERY 10 MIN PRN
Status: DISCONTINUED | OUTPATIENT
Start: 2020-10-06 | End: 2020-10-06 | Stop reason: HOSPADM

## 2020-10-06 RX ORDER — NALOXONE HYDROCHLORIDE 0.4 MG/ML
.1-.4 INJECTION, SOLUTION INTRAMUSCULAR; INTRAVENOUS; SUBCUTANEOUS
Status: DISCONTINUED | OUTPATIENT
Start: 2020-10-06 | End: 2020-10-06 | Stop reason: HOSPADM

## 2020-10-06 RX ORDER — FENTANYL CITRATE 50 UG/ML
INJECTION, SOLUTION INTRAMUSCULAR; INTRAVENOUS PRN
Status: DISCONTINUED | OUTPATIENT
Start: 2020-10-06 | End: 2020-10-06

## 2020-10-06 RX ORDER — MAGNESIUM HYDROXIDE 1200 MG/15ML
LIQUID ORAL PRN
Status: DISCONTINUED | OUTPATIENT
Start: 2020-10-06 | End: 2020-10-06 | Stop reason: HOSPADM

## 2020-10-06 RX ORDER — PROPOFOL 10 MG/ML
INJECTION, EMULSION INTRAVENOUS CONTINUOUS PRN
Status: DISCONTINUED | OUTPATIENT
Start: 2020-10-06 | End: 2020-10-06

## 2020-10-06 RX ORDER — HYDROXYZINE HYDROCHLORIDE 50 MG/ML
50 INJECTION, SOLUTION INTRAMUSCULAR ONCE
Status: COMPLETED | OUTPATIENT
Start: 2020-10-06 | End: 2020-10-06

## 2020-10-06 RX ORDER — SODIUM CHLORIDE, SODIUM LACTATE, POTASSIUM CHLORIDE, CALCIUM CHLORIDE 600; 310; 30; 20 MG/100ML; MG/100ML; MG/100ML; MG/100ML
INJECTION, SOLUTION INTRAVENOUS CONTINUOUS PRN
Status: DISCONTINUED | OUTPATIENT
Start: 2020-10-06 | End: 2020-10-06

## 2020-10-06 RX ORDER — KETOROLAC TROMETHAMINE 30 MG/ML
INJECTION, SOLUTION INTRAMUSCULAR; INTRAVENOUS PRN
Status: DISCONTINUED | OUTPATIENT
Start: 2020-10-06 | End: 2020-10-06

## 2020-10-06 RX ORDER — ONDANSETRON 2 MG/ML
INJECTION INTRAMUSCULAR; INTRAVENOUS PRN
Status: DISCONTINUED | OUTPATIENT
Start: 2020-10-06 | End: 2020-10-06

## 2020-10-06 RX ORDER — SCOLOPAMINE TRANSDERMAL SYSTEM 1 MG/1
1 PATCH, EXTENDED RELEASE TRANSDERMAL
Status: DISCONTINUED | OUTPATIENT
Start: 2020-10-06 | End: 2020-10-06 | Stop reason: HOSPADM

## 2020-10-06 RX ORDER — LIDOCAINE HYDROCHLORIDE 20 MG/ML
INJECTION, SOLUTION INFILTRATION; PERINEURAL PRN
Status: DISCONTINUED | OUTPATIENT
Start: 2020-10-06 | End: 2020-10-06

## 2020-10-06 RX ORDER — FENTANYL CITRATE 0.05 MG/ML
25-50 INJECTION, SOLUTION INTRAMUSCULAR; INTRAVENOUS
Status: DISCONTINUED | OUTPATIENT
Start: 2020-10-06 | End: 2020-10-06 | Stop reason: HOSPADM

## 2020-10-06 RX ORDER — HYDROCODONE BITARTRATE AND ACETAMINOPHEN 5; 325 MG/1; MG/1
1 TABLET ORAL
Status: COMPLETED | OUTPATIENT
Start: 2020-10-06 | End: 2020-10-06

## 2020-10-06 RX ORDER — HYDROCODONE BITARTRATE AND ACETAMINOPHEN 5; 325 MG/1; MG/1
1-2 TABLET ORAL EVERY 4 HOURS PRN
Qty: 10 TABLET | Refills: 0 | Status: SHIPPED | OUTPATIENT
Start: 2020-10-06 | End: 2020-11-03

## 2020-10-06 RX ORDER — BUPIVACAINE HYDROCHLORIDE 2.5 MG/ML
INJECTION, SOLUTION INFILTRATION; PERINEURAL PRN
Status: DISCONTINUED | OUTPATIENT
Start: 2020-10-06 | End: 2020-10-06 | Stop reason: HOSPADM

## 2020-10-06 RX ORDER — PHENAZOPYRIDINE HYDROCHLORIDE 200 MG/1
200 TABLET, FILM COATED ORAL ONCE
Status: DISCONTINUED | OUTPATIENT
Start: 2020-10-06 | End: 2020-10-06 | Stop reason: HOSPADM

## 2020-10-06 RX ORDER — PROPOFOL 10 MG/ML
INJECTION, EMULSION INTRAVENOUS PRN
Status: DISCONTINUED | OUTPATIENT
Start: 2020-10-06 | End: 2020-10-06

## 2020-10-06 RX ORDER — DEXAMETHASONE SODIUM PHOSPHATE 4 MG/ML
INJECTION, SOLUTION INTRA-ARTICULAR; INTRALESIONAL; INTRAMUSCULAR; INTRAVENOUS; SOFT TISSUE PRN
Status: DISCONTINUED | OUTPATIENT
Start: 2020-10-06 | End: 2020-10-06

## 2020-10-06 RX ORDER — ONDANSETRON 4 MG/1
4 TABLET, ORALLY DISINTEGRATING ORAL EVERY 30 MIN PRN
Status: DISCONTINUED | OUTPATIENT
Start: 2020-10-06 | End: 2020-10-06 | Stop reason: HOSPADM

## 2020-10-06 RX ADMIN — SCOPALAMINE 1 PATCH: 1 PATCH, EXTENDED RELEASE TRANSDERMAL at 10:18

## 2020-10-06 RX ADMIN — HYDROMORPHONE HYDROCHLORIDE 0.5 MG: 1 INJECTION, SOLUTION INTRAMUSCULAR; INTRAVENOUS; SUBCUTANEOUS at 12:21

## 2020-10-06 RX ADMIN — MIDAZOLAM 2 MG: 1 INJECTION INTRAMUSCULAR; INTRAVENOUS at 10:20

## 2020-10-06 RX ADMIN — FENTANYL CITRATE 100 MCG: 50 INJECTION, SOLUTION INTRAMUSCULAR; INTRAVENOUS at 10:20

## 2020-10-06 RX ADMIN — ONDANSETRON 4 MG: 2 INJECTION INTRAMUSCULAR; INTRAVENOUS at 11:15

## 2020-10-06 RX ADMIN — HYDROMORPHONE HYDROCHLORIDE 0.5 MG: 1 INJECTION, SOLUTION INTRAMUSCULAR; INTRAVENOUS; SUBCUTANEOUS at 10:41

## 2020-10-06 RX ADMIN — ROCURONIUM BROMIDE 50 MG: 10 INJECTION INTRAVENOUS at 10:25

## 2020-10-06 RX ADMIN — PROPOFOL: 10 INJECTION, EMULSION INTRAVENOUS at 10:48

## 2020-10-06 RX ADMIN — FENTANYL CITRATE 25 MCG: 0.05 INJECTION, SOLUTION INTRAMUSCULAR; INTRAVENOUS at 11:52

## 2020-10-06 RX ADMIN — KETOROLAC TROMETHAMINE 30 MG: 30 INJECTION, SOLUTION INTRAMUSCULAR at 11:22

## 2020-10-06 RX ADMIN — ROCURONIUM BROMIDE 10 MG: 10 INJECTION INTRAVENOUS at 11:00

## 2020-10-06 RX ADMIN — PROPOFOL 175 MCG/KG/MIN: 10 INJECTION, EMULSION INTRAVENOUS at 10:27

## 2020-10-06 RX ADMIN — FENTANYL CITRATE 25 MCG: 0.05 INJECTION, SOLUTION INTRAMUSCULAR; INTRAVENOUS at 11:47

## 2020-10-06 RX ADMIN — SODIUM CHLORIDE, POTASSIUM CHLORIDE, SODIUM LACTATE AND CALCIUM CHLORIDE: 600; 310; 30; 20 INJECTION, SOLUTION INTRAVENOUS at 10:20

## 2020-10-06 RX ADMIN — HYDROXYZINE HYDROCHLORIDE 50 MG: 50 INJECTION, SOLUTION INTRAMUSCULAR at 12:43

## 2020-10-06 RX ADMIN — DEXAMETHASONE SODIUM PHOSPHATE 4 MG: 4 INJECTION, SOLUTION INTRA-ARTICULAR; INTRALESIONAL; INTRAMUSCULAR; INTRAVENOUS; SOFT TISSUE at 10:29

## 2020-10-06 RX ADMIN — LIDOCAINE HYDROCHLORIDE 60 MG: 20 INJECTION, SOLUTION INFILTRATION; PERINEURAL at 10:24

## 2020-10-06 RX ADMIN — DEXMEDETOMIDINE HYDROCHLORIDE 0.5 MCG/KG/HR: 100 INJECTION, SOLUTION INTRAVENOUS at 10:27

## 2020-10-06 RX ADMIN — SUGAMMADEX 200 MG: 100 INJECTION, SOLUTION INTRAVENOUS at 11:19

## 2020-10-06 RX ADMIN — HYDROMORPHONE HYDROCHLORIDE 0.5 MG: 1 INJECTION, SOLUTION INTRAMUSCULAR; INTRAVENOUS; SUBCUTANEOUS at 12:05

## 2020-10-06 RX ADMIN — PROPOFOL 200 MG: 10 INJECTION, EMULSION INTRAVENOUS at 10:24

## 2020-10-06 RX ADMIN — HYDROCODONE BITARTRATE AND ACETAMINOPHEN 1 TABLET: 5; 325 TABLET ORAL at 12:47

## 2020-10-06 ASSESSMENT — MIFFLIN-ST. JEOR: SCORE: 1734.58

## 2020-10-06 NOTE — BRIEF OP NOTE
[facility] Brief Operative Note    Pre-operative diagnosis: Endometrial polyp [N84.0]  Complex cyst of left ovary [N83.292]   Post-operative diagnosis Polyp, endometriosis, adhesions   Procedure: Procedure(s):  HYSTEROSCOPY, DILATATION CURETTAGE  LAPAROSCOPY WITH CO2 LASER,LYSIS OF ADHESIONS, LASER VAPORIZATION OF ENDOMETRIOSIS   Surgeon(s): Surgeon(s) and Role:  Panel 1:     * Mino Urias MD - Primary  Panel 2:     * Mino Urias MD - Primary   Estimated blood loss: 20 mL    Specimens: ID Type Source Tests Collected by Time Destination   A : ENDOMETRIAL CURETTINGS Tissue Endometrium SURGICAL PATHOLOGY EXAM Mino Urias MD 10/6/2020 10:46 AM       Findings: LEFT ADNEXAL ADHESIONS AND ENDOEMTRIOSIS

## 2020-10-06 NOTE — ANESTHESIA POSTPROCEDURE EVALUATION
Patient: Shayy Ndiaye    Procedure(s):  HYSTEROSCOPY, DILATATION CURETTAGE  LAPAROSCOPY WITH CO2 LASER, LASER VAPORIZATION OF ENDOMETRIOSIS  LYSIS OF ADHESIONS    Diagnosis:Endometrial polyp [N84.0]  Complex cyst of left ovary [N83.292]  Diagnosis Additional Information: No value filed.    Anesthesia Type:  General    Note:  Anesthesia Post Evaluation    Patient location during evaluation: PACU  Patient participation: Able to fully participate in evaluation  Level of consciousness: awake  Pain management: adequate  Airway patency: patent  Cardiovascular status: acceptable  Respiratory status: acceptable  Hydration status: acceptable  PONV: none     Anesthetic complications: None          Last vitals:  Vitals:    10/06/20 1300 10/06/20 1315 10/06/20 1400   BP: (!) 133/95 (!) 134/90 128/89   Pulse: 101 101    Resp: 12 8 20   Temp: 37.2  C (98.9  F)  37.1  C (98.7  F)   SpO2: 97% 95% 97%         Electronically Signed By: Trell Odom MD  October 6, 2020  2:38 PM

## 2020-10-06 NOTE — OP NOTE
Procedure Date: 10/06/2020      REASON FOR ADMISSION:     1.  Recurrent pelvic pain, dysfunctional bleeding.   2.  Endometrial polyp.      OPERATIVE PROCEDURES:  Hysteroscopy, endometrial curettage for polyps, diagnostic laparoscopy, laser lysis of adhesion, laser vaporization of endometriosis.      SURGEON:  Mino Urias MD      OPERATIVE FINDINGS:  The patient had 2 polyps in the endometrial cavity that were removed and sent with all curettings to the pathologist.  Intraabdominally, the patient had a relatively clean anterior and posterior cul-de-sac and right side, but extreme scarring and enlargement of the left tube and left ovary with endometriosis.  The adnexa were freed from the posterior aspect of the uterus and from the sidewall.  The endometrioma was decompressed.  Upper abdomen exploration was unremarkable.      OPERATIVE PROCEDURE:  After general anesthesia was induced, the patient was placed in the dorsal lithotomy position and prepped and draped in the usual fashion.  A Pearson catheter was placed.  The cervix was grasped and carefully dilated.  The hysteroscope was placed.  The polyps were identified and a thorough curettage of the endometrial cavity resulted in a clean cavity.  All tissue was submitted to the pathologist.  A uterine manipulator was placed.      Through a subumbilical incision, the Veress needle was placed and 3 liters of CO2 were insufflated.  The laparoscope, trocar and sheath were placed without incident.  A 5 mm left lower quadrant trocar was placed under direct vision without complication.  The laser scope was brought in and minimal surface disease on the right ovary was vaporized away.  We took down the adhesions between the ovary and the uterus, the tube and the sidewall.  We could follow the tube around to its fimbriated end, but there was a fair amount of dilatation and probable hematosalpinx.  An endometrioma was decompressed of chocolate material and irrigated.  This was  lysed to the base.  Hemostasis was adequate.        At this point, the decision was made to back out.  All peritoneal surfaces were inspected, and the bowel inspected.  All gas was exhausted and instruments were removed.  The incisions were closed with 4-0 Vicryl and Steri-Strips.  The estimated blood loss was 20 Ml.  The patient tolerated this well and went to the recovery room.  She was given Toradol.      PLAN:  She will be given Norco as needed for pain.  She is asked to call for any fever, chills, change in bowel or bladder function.  She will return to the office in 2 weeks for a postoperative check.         JOHNATHAN DURAN JR, MD             D: 10/06/2020   T: 10/06/2020   MT: DENIS      Name:     MAVERICK PALACIOS   MRN:      [id number]        Account:        [id number]   :      1980           Procedure Date: 10/06/2020      Document: [id number]

## 2020-10-06 NOTE — OR NURSING
PNDS Discharge Criteria met.  Discharge instructionns reviewed with Ms. Ndiaye, and with her significant other Jadiel by telephone per the Covid prevention procedure; both verbalize understanding.  Will discharge to home with written copy of AVS, photographs, filled Rx for Norco and cold packs.

## 2020-10-06 NOTE — DISCHARGE INSTRUCTIONS
Today you received Toradol, an antiinflammatory medication similar to Ibuprofen.  You should not take other antiinflammatory medication, such as Ibuprofen, Motrin, Advil, Aleve, Naprosyn, etc until 5:22pm.     Same Day Surgery Discharge Instructions for  Sedation and General Anesthesia       It's not unusual to feel dizzy, light-headed or faint for up to 24 hours after surgery or while taking pain medication.  If you have these symptoms: sit for a few minutes before standing and have someone assist you when you get up to walk or use the bathroom.      You should rest and relax for the next 24 hours. We recommend you make arrangements to have an adult stay with you for at least 24 hours after your discharge.  Avoid hazardous and strenuous activity.      DO NOT DRIVE any vehicle or operate mechanical equipment for 24 hours following the end of your surgery.  Even though you may feel normal, your reactions may be affected by the medication you have received.      Do not drink alcoholic beverages for 24 hours following surgery.       Slowly progress to your regular diet as you feel able. It's not unusual to feel nauseated and/or vomit after receiving anesthesia.  If you develop these symptoms, drink clear liquids (apple juice, ginger ale, broth, 7-up, etc. ) until you feel better.  If your nausea and vomiting persists for 24 hours, please notify your surgeon.        All narcotic pain medications, along with inactivity and anesthesia, can cause constipation. Drinking plenty of liquids and increasing fiber intake will help.      For any questions of a medical nature, call your surgeon.      Do not make important decisions for 24 hours.      If you had general anesthesia, you may have a sore throat for a couple of days related to the breathing tube used during surgery.  You may use Cepacol lozenges to help with this discomfort.  If it worsens or if you develop a fever, contact your surgeon.       If you feel your pain is  not well managed with the pain medications prescribed by your surgeon, please contact your surgeon's office to let them know so they can address your concerns.       CoVid 19 Information    We want to give you information regarding Covid. Please consult your primary care provider with any questions you might have.     Patient who have symptoms (cough, fever, or shortness of breath), need to isolate for 7 days from when symptoms started OR 72 hours after fever resolves (without fever reducing medications) AND improvement of respiratory symptoms (whichever is longer).      Isolate yourself at home (in own room/own bathroom if possible)    Do Not allow any visitors    Do Not go to work or school    Do Not go to [employer],  centers, shopping, or other public places.    Do Not shake hands.    Avoid close and intimate contact with others (hugging, kissing).    Follow CDC recommendations for household cleaning of frequently touched services.     After the initial 7 days, continue to isolate yourself from household members as much as possible. To continue decrease the risk of community spread and exposure, you and any members of your household should limit activities in public for 14 days after starting home isolation.     You can reference the following CDC link for helpful home isolation/care tips:  https://www.cdc.gov/coronavirus/2019-ncov/downloads/10Things.pdf    Protect Others:    Cover Your Mouth and Nose with a mask, disposable tissue or wash cloth to avoid spreading germs to others.    Wash your hands and face frequently with soap and water    Call Your Primary Doctor If: Breathing difficulty develops or you become worse.    For more information about COVID19 and options for caring for yourself at home, please visit the CDC website at https://www.cdc.gov/coronavirus/2019-ncov/about/steps-when-sick.html  For more options for care at [facility], please visit our website at  [email or web address]            [facility]  Discharge Instructions  Following D & C / Hysteroscopy    Activity  You may resume normal activities including lifting as needed.  It is permissible to climb stairs. You may drive after 24 hours as long as you are not taking narcotic pain pills.  Baths or showers are perfectly acceptable.      Vaginal Discharge  You may have some vaginal bleeding or discharge for about a week after procedure.  You may use tampons or pads.    Temperature  If you develop temperature elevations to over 101  Fahrenheit, your physician should be called immediately.    Diet  [place] or light diet is advisable the day of surgery.  If nausea persists, continue this diet.  If severe, call.    Follow-up  Make an appointment in 1-2 weeks if instructed to at: [phone]        [facility]  [phone]      HOME CARE FOLLOWING LAPAROSCOPY  [facility]  [phone]      Diet  You have no restrictions on your diet.  During the evening following surgery, drink plenty of fluids and eat a light supper.    Nausea  The anesthesia may produce some nausea.  If you feel nauseated try drinking fluids such as 7-Up, tea, or soup.     Discomfort  The amount of discomfort you can expect is very unpredictable.  If you have pain that cannot be controlled with Tylenol or with the prescription you may have received, you should notify your physician.  The following complaints are not uncommon and should not be cause for concern:  1. Abdominal tenderness; abdominal cramping.  2. Low backache or pain radiating to your shoulders, chest or back.  This is a result of the gas used to inflate your abdomen during surgery.  Lying flat in bed seems to help relieve this.   3. Sore throat for a day or two resulting from the anesthesia tube used during surgery.   4. Some bruising on your abdomen.     Drainage  You may expect a small amount of drainage from  the incision on your abdomen and you may change the bandage when necessary.  You will also have a small amount of vaginal drainage for several days; this is normal and no cause for concern.  If excessive bleeding occurs, notify your physician.      If dye was used during your procedure, your urine will initially be bright blue. It will gradually return to yellow throughout the day. Drinking plenty of fluids will help to filter the dye from your urine.    Fever  A low grade fever (not over 101  Fahrenheit) is usual after this procedure.  Do not hesitate to notify your physician if your fever seems excessive.    Activity  Rest on the day of surgery then you may resume your normal activity, as tolerated. Avoid heavy lifting for one week.    You may shower.  Do not douche or use tampons.  If you also had a D&C, do not resume intercourse until bleeding has ceased.    Emergency Care  Contact your physician if you have any of these problems:   1.  A fever over 101  Fahrenheit   2.  A large amount of bleeding or drainage   3.  Severe pain    **If you have questions or concerns about your procedure,   call Dr. Urias at [phone]**

## 2020-10-06 NOTE — ANESTHESIA CARE TRANSFER NOTE
Patient: Shayy Ndiaye    Procedure(s):  HYSTEROSCOPY, DILATATION CURETTAGE  LAPAROSCOPY WITH CO2 LASER    Diagnosis: Endometrial polyp [N84.0]  Complex cyst of left ovary [N83.292]  Diagnosis Additional Information: No value filed.    Anesthesia Type:   General     Note:  Airway :Face Mask  Patient transferred to:PACU  Handoff Report: Identifed the Patient, Identified the Reponsible Provider, Reviewed the pertinent medical history, Discussed the surgical course, Reviewed Intra-OP anesthesia mangement and issues during anesthesia, Set expectations for post-procedure period and Allowed opportunity for questions and acknowledgement of understanding      Vitals: (Last set prior to Anesthesia Care Transfer)    CRNA VITALS  10/6/2020 1059 - 10/6/2020 1137      10/6/2020             Pulse:  91    SpO2:  97 %    Resp Rate (set):  10                Electronically Signed By: SANNA Latham CRNA  October 6, 2020  11:37 AM

## 2020-10-06 NOTE — ANESTHESIA PREPROCEDURE EVALUATION
Anesthesia Pre-Procedure Evaluation    Patient: Shayy Ndiaye   MRN: [id number] : 1980          Preoperative Diagnosis: Endometrial polyp [N84.0]  Complex cyst of left ovary [N83.292]    Procedure(s):  HYSTEROSCOPY, DILATATION CURETTAGE  LAPAROSCOPY WITH CO2 LASER  POSSIBLE LEFT SALPINGO-OOPHORECTOMY    Past Medical History:   Diagnosis Date     Abnormal Pap smear of cervix      ASCUS with positive high risk HPV cervical 2018 ASCUS pap/+ HR HPV (not 16 or 18).  Plan: colposcopy by 18     Endometriosis      Motion sickness      PONV (postoperative nausea and vomiting)      Past Surgical History:   Procedure Laterality Date     D & C      elected AB     HC TOOTH EXTRACTION W/FORCEP       LAPAROSCOPY DIAGNOSTIC (GYN)  2008    endo     LAPAROSCOPY DIAGNOSTIC (GYN)  2008    endo     TONSILLECTOMY & ADENOIDECTOMY         Anesthesia Evaluation     . Pt has had prior anesthetic. Type: General    History of anesthetic complications   - PONV        ROS/MED HX    ENT/Pulmonary:  - neg pulmonary ROS     Neurologic:  - neg neurologic ROS     Cardiovascular:  - neg cardiovascular ROS       METS/Exercise Tolerance:  >4 METS   Hematologic:         Musculoskeletal:         GI/Hepatic:  - neg GI/hepatic ROS       Renal/Genitourinary:      (-) renal disease   Endo:     (+) Obesity, .      Psychiatric:         Infectious Disease:         Malignancy:         Other:                          Physical Exam  Normal systems: dental    Airway   Mallampati: II  TM distance: >3 FB  Neck ROM: full    Dental     Cardiovascular   Rhythm and rate: regular and normal      Pulmonary    breath sounds clear to auscultation            Lab Results   Component Value Date     2018    POTASSIUM 3.8 2018    CHLORIDE 107 2018    CO2 25 2018    BUN 6 (L) 2018    CR 0.64 2018    GLC 99 2018    WOJCIECH 9.1 2018    ALBUMIN 4.1 2018    PROTTOTAL 7.5 2018    ALT 31  "06/08/2018    AST 20 06/08/2018    ALKPHOS 89 06/08/2018    BILITOTAL 0.2 06/08/2018    TSH 1.79 06/08/2018       Preop Vitals  BP Readings from Last 3 Encounters:   10/06/20 (!) 142/97   09/23/20 102/68   06/08/20 124/72    Pulse Readings from Last 3 Encounters:   09/23/20 78   06/08/20 76   06/03/20 76      Resp Readings from Last 3 Encounters:   No data found for Resp    SpO2 Readings from Last 3 Encounters:   10/06/20 97%      Temp Readings from Last 1 Encounters:   10/06/20 36.7  C (98  F) (Temporal)    Ht Readings from Last 1 Encounters:   10/06/20 1.702 m (5' 7\")      Wt Readings from Last 1 Encounters:   10/06/20 102.2 kg (225 lb 4.8 oz)    Estimated body mass index is 35.29 kg/m  as calculated from the following:    Height as of this encounter: 1.702 m (5' 7\").    Weight as of this encounter: 102.2 kg (225 lb 4.8 oz).       Anesthesia Plan      History & Physical Review  History and physical reviewed and following examination; no interval change.    ASA Status:  1 .    NPO Status:  > 8 hours    Plan for General (ETT) with Intravenous and Propofol induction. Maintenance will be TIVA.    PONV prophylaxis:  Ondansetron (or other 5HT-3), Dexamethasone or Solumedrol, Other (See comment) and Scopolamine patch  (propofol/precedex TIVA        Postoperative Care  Postoperative pain management:  IV analgesics and Oral pain medications.      Consents  Anesthetic plan, risks, benefits and alternatives discussed with:  Patient..                 Trell Odom MD  "

## 2020-10-07 LAB — COPATH REPORT: NORMAL

## 2020-11-03 ENCOUNTER — OFFICE VISIT (OUTPATIENT)
Dept: OBGYN | Facility: CLINIC | Age: 40
End: 2020-11-03
Payer: COMMERCIAL

## 2020-11-03 VITALS
HEIGHT: 67 IN | DIASTOLIC BLOOD PRESSURE: 84 MMHG | BODY MASS INDEX: 33.59 KG/M2 | WEIGHT: 214 LBS | HEART RATE: 84 BPM | SYSTOLIC BLOOD PRESSURE: 136 MMHG

## 2020-11-03 DIAGNOSIS — Z09 POSTOP CHECK: Primary | ICD-10-CM

## 2020-11-03 PROCEDURE — 99024 POSTOP FOLLOW-UP VISIT: CPT | Performed by: OBSTETRICS & GYNECOLOGY

## 2020-11-03 ASSESSMENT — MIFFLIN-ST. JEOR: SCORE: 1683.33

## 2020-11-03 NOTE — PROGRESS NOTES
The patient is seen at this time for postop check.  She had a hysteroscopy with negative biopsies.  She had a laparoscopy with lysis of adhesion and treatment of a left endometrioma and left hydrosalpinx.  Her incisions of healed well.  She has had multiple side effects to previous suppressive protocols including Lupron and birth control pills.  She is resistant at this time.  She also needs a GI work-up for multiple symptoms.

## 2020-11-04 ENCOUNTER — VIRTUAL VISIT (OUTPATIENT)
Dept: FAMILY MEDICINE | Facility: OTHER | Age: 40
End: 2020-11-04

## 2020-11-04 DIAGNOSIS — Z20.822 SUSPECTED COVID-19 VIRUS INFECTION: Primary | ICD-10-CM

## 2020-11-04 PROCEDURE — U0003 INFECTIOUS AGENT DETECTION BY NUCLEIC ACID (DNA OR RNA); SEVERE ACUTE RESPIRATORY SYNDROME CORONAVIRUS 2 (SARS-COV-2) (CORONAVIRUS DISEASE [COVID-19]), AMPLIFIED PROBE TECHNIQUE, MAKING USE OF HIGH THROUGHPUT TECHNOLOGIES AS DESCRIBED BY CMS-2020-01-R: HCPCS | Performed by: FAMILY MEDICINE

## 2020-11-04 NOTE — PROGRESS NOTES
"Date: 2020 09:22:21  Clinician: Zoe Jackson  Clinician NPI: [id number]  Patient: Shayy Ndiaye  Patient : 1980  Patient Address: [address] [place] [address] [address] [place] [address]  Patient Phone: [phone]  Visit Protocol: URI  Patient Summary:  Shayy is a 40 year old ( : 1980 ) female who initiated a [facility] Visit for COVID-19 (Coronavirus) evaluation and screening. When asked the question \"Please sign me up to receive news, health information and promotions. \", Shayy responded \"No\".    Shayy states her symptoms started 1-2 days ago.   Her symptoms consist of myalgia, chills, malaise, and a headache. Shayy also feels feverish.   Symptom details     Temperature: Her current temperature is 98.4 degrees Fahrenheit.     Headache: She states the headache is moderate (4-6 on a 10 point pain scale).      Shayy denies having vomiting, rhinitis, facial pain or pressure, sore throat, teeth pain, ageusia, diarrhea, ear pain, wheezing, cough, nasal congestion, nausea, and anosmia. She also denies taking antibiotic medication in the past month, having recent facial or sinus surgery in the past 60 days, and having a sinus infection within the past year. She is not experiencing dyspnea.   Precipitating events  She has not recently been exposed to someone with influenza. Shayy has been in close contact with the following high risk individuals: people with asthma, heart disease or diabetes.   Pertinent COVID-19 (Coronavirus) information  Shayy does not work or volunteer as healthcare worker or a . In the past 14 days, Shayy has not worked or volunteered at a healthcare facility or group living setting.   In the past 14 days, she also has not lived in a congregate living setting.   Shayy has not had a close contact with a laboratory-confirmed COVID-19 patient within 14 days of symptom onset.    Since 2019, Shayy has been tested for COVID-19 and has not had upper " respiratory infection or influenza-like illness.      Result of COVID-19 test: Negative     Date of her COVID-19 test: 10/03/2020      Pertinent medical history  Shayy does not get yeast infections when she takes antibiotics.   Shayy does not need a return to work/school note.   Weight: 210 lbs   Shayy does not smoke or use smokeless tobacco.   She denies pregnancy and denies breastfeeding. She has menstruated in the past month.   Additional information as reported by the patient (free text): 101.6 fever last night &amp; extreme chills/shaking   Weight: 210 lbs    MEDICATIONS: No current medications, ALLERGIES: erythromycin base  Clinician Response:  Dear Shayy,   Your symptoms show that you may have coronavirus (COVID-19). This illness can cause fever, cough and trouble breathing. Many people get a mild case and get better on their own. Some people can get very sick.  What should I do?  We would like to test you for this virus.   1. Please call [phone] to schedule your visit. Explain that you were referred by [facility] to have a COVID-19 test. Be ready to share your [facility] visit ID number.  * If you need to schedule in [facility] please call [phone] or for [employer] [place] employees please call [phone].  * If you need to schedule in the [place] area please call [phone]. [place] employees call [phone].  The following will serve as your written order for this COVID Test, ordered by me, for the indication of suspected COVID [Z20.828]: The test will be ordered in [facility], our electronic health record, after you are scheduled. It will show as ordered and authorized by Chirag Maguire MD.  Order: COVID-19 (Coronavirus) PCR for SYMPTOMATIC testing from [facility].   2. When it's time for your COVID test:  Stay at least 6 feet away from others. (If someone will drive you to your test, stay in the backseat, as far away from the  as you can.)   Cover your mouth and nose with a mask, tissue or washcloth.  Go  "straight to the testing site. Don't make any stops on the way there or back.      3.Starting now: Stay home and away from others (self-isolate) until:   You've had no fever---and no medicine that reduces fever---for one full day (24 hours). And...   Your other symptoms have gotten better. For example, your cough or breathing has improved. And...   At least 10 days have passed since your symptoms started.       During this time, don't leave the house except for testing or medical care.   Stay in your own room, even for meals. Use your own bathroom if you can.   Stay away from others in your home. No hugging, kissing or shaking hands. No visitors.  Don't go to work, school or anywhere else.    Clean \"high touch\" surfaces often (doorknobs, counters, handles, etc.). Use a household cleaning spray or wipes. You'll find a full list of  on the EPA website: www.epa.gov/pesticide-registration/list-n-disinfectants-use-against-sars-cov-2.   Cover your mouth and nose with a mask, tissue or washcloth to avoid spreading germs.  Wash your hands and face often. Use soap and water.  Caregivers in these groups are at risk for severe illness due to COVID-19:  o People 65 years and older  o People who live in a nursing home or long-term care facility  o People with chronic disease (lung, heart, cancer, diabetes, kidney, liver, immunologic)  o People who have a weakened immune system, including those who:   Are in cancer treatment  Take medicine that weakens the immune system, such as corticosteroids  Had a bone marrow or organ transplant  Have an immune deficiency  Have poorly controlled HIV or AIDS  Are obese (body mass index of 40 or higher)  Smoke regularly   o Caregivers should wear gloves while washing dishes, handling laundry and cleaning bedrooms and bathrooms.  o Use caution when washing and drying laundry: Don't shake dirty laundry, and use the warmest water setting that you can.  o For more tips, go to " www.cdc.gov/coronavirus/2019-ncov/downloads/10Things.pdf.    4.Sign up for [employer]. We know it's scary to hear that you might have COVID-19. We want to track your symptoms to make sure you're okay over the next 2 weeks. Please look for an email from [employer]---this is a free, online program that we'll use to keep in touch. To sign up, follow the link in the email. Learn more at [email or web address]  How can I take care of myself?   Get lots of rest. Drink extra fluids (unless a doctor has told you not to).   Take Tylenol (acetaminophen) for fever or pain. If you have liver or kidney problems, ask your family doctor if it's okay to take Tylenol.   Adults can take either:    650 mg (two 325 mg pills) every 4 to 6 hours, or...   1,000 mg (two 500 mg pills) every 8 hours as needed.    Note: Don't take more than 3,000 mg in one day. Acetaminophen is found in many medicines (both prescribed and over-the-counter medicines). Read all labels to be sure you don't take too much.   For children, check the Tylenol bottle for the right dose. The dose is based on the child's age or weight.    If you have other health problems (like cancer, heart failure, an organ transplant or severe kidney disease): Call your specialty clinic if you don't feel better in the next 2 days.       Know when to call 911. Emergency warning signs include:    Trouble breathing or shortness of breath Pain or pressure in the chest that doesn't go away Feeling confused like you haven't felt before, or not being able to wake up Bluish-colored lips or face.  Where can I get more information?    [employer] [place] -- About COVID-19: [email or web address]   CDC -- What to Do If You're Sick: www.cdc.gov/coronavirus/2019-ncov/about/steps-when-sick.html   CDC -- Ending Home Isolation: www.cdc.gov/coronavirus/2019-ncov/hcp/disposition-in-home-patients.html   CDC -- Caring for Someone:  www.cdc.gov/coronavirus/2019-ncov/if-you-are-sick/care-for-someone.html   [facility] -- Interim Guidance for Hospital Discharge to Home: www.health.[facility].[place].us/diseases/coronavirus/hcp/hospdischarge.pdf   [facility] clinical trials (COVID-19 research studies): clinicalaffairs.[facility]-clinical-trials    Below are the COVID-19 hotlines at the [place] Department of Health ([facility]). Interpreters are available.    For health questions: Call 710-897-6124 or 1-649.943.3692 (7 a.m. to 7 p.m.) For questions about schools and childcare: Call 174-256-8752 or 1-319.144.1468 (7 a.m. to 7 p.m.)    Diagnosis: Contact with and (suspected) exposure to other communicable diseases  Diagnosis ICD: Z20.89

## 2020-11-05 LAB
SARS-COV-2 RNA SPEC QL NAA+PROBE: NOT DETECTED
SPECIMEN SOURCE: NORMAL

## 2020-11-20 ENCOUNTER — TRANSFERRED RECORDS (OUTPATIENT)
Dept: HEALTH INFORMATION MANAGEMENT | Facility: CLINIC | Age: 40
End: 2020-11-20

## 2021-01-15 ENCOUNTER — HEALTH MAINTENANCE LETTER (OUTPATIENT)
Age: 41
End: 2021-01-15

## 2021-09-08 ENCOUNTER — PATIENT OUTREACH (OUTPATIENT)
Dept: OBGYN | Facility: CLINIC | Age: 41
End: 2021-09-08
Payer: COMMERCIAL

## 2021-09-08 DIAGNOSIS — R87.610 ASCUS WITH POSITIVE HIGH RISK HPV CERVICAL: ICD-10-CM

## 2021-09-08 DIAGNOSIS — R87.810 ASCUS WITH POSITIVE HIGH RISK HPV CERVICAL: ICD-10-CM

## 2021-10-24 ENCOUNTER — HEALTH MAINTENANCE LETTER (OUTPATIENT)
Age: 41
End: 2021-10-24

## 2021-11-08 NOTE — TELEPHONE ENCOUNTER
FYI to provider - Patient is lost to pap tracking follow-up. Attempts to contact pt have been made per reminder process and there has been no reply and/or no appt scheduled.       7/23/15 NIL pap  6/8/18 ASCUS pap/+ HR HPV (not 16 or 18).  Plan: colposcopy by 9/8/18 8/13/18 Colpo: visually normal.  No bx.  Dx ASCUS pap, + HR HPV (not 16 or 18).  Plan: cotest in 1 year  4/29/19 NIL pap, + HR HPV (not 16/18).  Plan: cotest in 1 year.  CCT tracking.   9/23/20 NIL, Neg HPV. Plan 1 yr co-test  9/8/21 Reminder mychart  10/7/21 Reminder call -- left message  11/8/21 Lost to follow-up for pap tracking

## 2022-02-13 ENCOUNTER — HEALTH MAINTENANCE LETTER (OUTPATIENT)
Age: 42
End: 2022-02-13

## 2022-04-19 NOTE — PROGRESS NOTES
Shayy is a 41 year old  female who presents for annual exam.     Besides routine health maintenance, she has no other health concerns today .    HPI:  The patient's PCP is none.  Patient here today for her annual GYN exam and Pap smear.  She is due for her first mammogram today.  She had a recent laparoscopy for lysis of adhesions and endometriosis with removal of 2 uterine polyps in .  She is tearful today as she is worried about her health concerns.  Her mother had postmenopausal breast cancer and the patient is questioning breast MRI imaging.  Her mother was negative for any genetic component.  Another concern of the patient is possible heart racing.  She identifies that this could be anxiety.  We will do blood work today.  She is requesting a cardiology referral.  We did encourage her to establish care with a primary care provider as that referral would need to come from them.  She does have issues with constipation and pelvic pain.  She does have a history of endometriosis.  She is not sure she is effectively using her pelvic floor muscles for complete evacuation of her bowels.  She has been thinking about pelvic floor therapy.  She is always had underlying anxiety but feels it is getting worse with the pandemic.  She is open to meeting with a therapist.  She is requesting  Complete blood work today including vitamin testing and inflammation markers.  Her mother has a history of RA.  She does have a history of abnormal Pap smears and we will retest today.    GYNECOLOGIC HISTORY:    Patient's last menstrual period was 2022.    Regular menses? yes  Menses every 28-30 days.  Length of menses: 4 days    Her current contraception method is: none.  She  reports that she has never smoked. She has never used smokeless tobacco.    Patient is not sexually active.  STD testing offered?  Declined  Last PHQ-9 score on record =   PHQ-9 SCORE 2019   PHQ-9 Total Score 1     Last GAD7 score on record =    PAYAM-7 SCORE 2019   Total Score 0     Alcohol Score =     HEALTH MAINTENANCE:  Cholesterol:   Recent Labs   Lab Test 18  0934 14  0000   CHOL 135 164   HDL 58 51   LDL 70 95   TRIG 37 89     Last Mammo: Not applicable, Result: Not applicable, Next Mammo: Due at age 42 years of age  Pap:   Lab Results   Component Value Date    PAP NIL HPV- 2020    PAP NIL HPV+ 2019    PAP ASC-US 2018      Colonoscopy:  N/A, Result: Not applicable, Next Colonoscopy: 47 years of age  Dexa: N/A    Health maintenance updated:  Yes    HISTORY:  OB History    Para Term  AB Living   1 0 0 0 1 0   SAB IAB Ectopic Multiple Live Births   0 1 0 0 0      # Outcome Date GA Lbr [place]/2nd Weight Sex Delivery Anes PTL Lv   1 IAB                Patient Active Problem List   Diagnosis     Endometriosis     ASCUS with positive high risk HPV cervical     Endometrial polyp     Complex cyst of left ovary     Past Surgical History:   Procedure Laterality Date     D & C      elected AB     DILATION AND CURETTAGE, HYSTEROSCOPY DIAGNOSTIC, COMBINED N/A 10/6/2020    Procedure: HYSTEROSCOPY, DILATATION CURETTAGE;  Surgeon: Mino Urias MD;  Location:  OR      TOOTH EXTRACTION W/FORCEP       LAPAROSCOPIC LYSIS ADHESIONS N/A 10/6/2020    Procedure: LYSIS OF ADHESIONS;  Surgeon: Mino rUias MD;  Location:  OR     LAPAROSCOPY DIAGNOSTIC (GYN)  2008    endo     LAPAROSCOPY DIAGNOSTIC (GYN)  2008    endo     LASER CO2 LAPAROSCOPIC VAPORIZATION ENDOMETRIUM N/A 10/6/2020    Procedure: LAPAROSCOPY WITH CO2 LASER, LASER VAPORIZATION OF ENDOMETRIOSIS;  Surgeon: Mino Urias MD;  Location:  OR     TONSILLECTOMY & ADENOIDECTOMY        Social History     Tobacco Use     Smoking status: Never Smoker     Smokeless tobacco: Never Used   Substance Use Topics     Alcohol use: Yes     Comment: Occas       Problem (# of Occurrences) Relation (Name,Age of Onset)    Diabetes (2) Father, Paternal Aunt     "Hyperlipidemia (1) Mother    No Known Problems (7) Sister, Brother, Maternal Grandmother, Maternal Grandfather, Paternal Grandmother, Paternal Grandfather, Other            Current Outpatient Medications   Medication Sig     multivitamin w/minerals (MULTI-VITAMIN) tablet Take 1 tablet by mouth daily     Naproxen Sodium (ALEVE PO)      Vitamin D, Cholecalciferol, 10 MCG (400 UNIT) TABS      No current facility-administered medications for this visit.     Allergies   Allergen Reactions     Dust Mite Extract      STUFFY NOSE     Erythromycin      Minocycline Unknown and Nausea       Past medical, surgical, social and family histories were reviewed and updated in EPIC.    ROS:   12 point review of systems negative other than symptoms noted below or in the HPI.  No urinary frequency or dysuria, bladder or kidney problems    EXAM:  /84   Ht 1.727 m (5' 8\")   Wt 93 kg (205 lb)   LMP 04/14/2022   Breastfeeding No   BMI 31.17 kg/m     BMI: Body mass index is 31.17 kg/m .    PHYSICAL EXAM:  Constitutional:   Appearance: Well nourished, well developed, alert, in no acute distress  Neck:  Lymph Nodes:  No lymphadenopathy present    Thyroid:  Gland size normal, nontender, no nodules or masses present  on palpation  Chest:  Respiratory Effort:  Breathing unlabored  Cardiovascular:    Heart: Auscultation:  Regular rate, normal rhythm, no murmurs present  Breasts: Inspection of Breasts:  No lymphadenopathy present., Palpation of Breasts and Axillae:  No masses present on palpation, no breast tenderness., Axillary Lymph Nodes:  No lymphadenopathy present., No nodularity, asymmetry or nipple discharge bilaterally. and Dense bilaterally throughout  Gastrointestinal:   Abdominal Examination:  Abdomen nontender to palpation, tone normal without rigidity or guarding, no masses present, umbilicus without lesions   Liver and Spleen:  No hepatomegaly present, liver nontender to palpation    Hernias:  No hernias " present  Lymphatic: Lymph Nodes:  No other lymphadenopathy present  Skin:  General Inspection:  No rashes present, no lesions present, no areas of  discoloration  Neurologic:    Mental Status:  Oriented X3.  Normal strength and tone, sensory exam                grossly normal, mentation intact and speech normal.    Psychiatric:   Mentation appears normal and affect normal/bright.         Pelvic Exam:  External Genitalia:     Normal appearance for age, no discharge present, no tenderness present, no inflammatory lesions present, color normal  Vagina:     Normal vaginal vault without central or paravaginal defects, no discharge present, no inflammatory lesions present, no masses present  Bladder:     Nontender to palpation  Urethra:   Urethral Body:  Urethra palpation normal, urethra structural support normal   Urethral Meatus:  No erythema or lesions present  Cervix:     Appearance healthy, no lesions present, nontender to palpation, no bleeding present  Uterus:     Uterus: firm, normal sized and nontender, anteverted in position.   Adnexa:     No adnexal tenderness present, no adnexal masses present  Perineum:     Perineum within normal limits, no evidence of trauma, no rashes or skin lesions present  Anus:     Anus within normal limits, no hemorrhoids present  Inguinal Lymph Nodes:     No lymphadenopathy present  Pubic Hair:     Normal pubic hair distribution for age  Genitalia and Groin:     No rashes present, no lesions present, no areas of discoloration, no masses present      COUNSELING:   Special attention given to:        Regular exercise       Healthy diet/nutrition    BMI: Body mass index is 31.17 kg/m .  Weight management plan: Discussed healthy diet and exercise guidelines    ASSESSMENT:  41 year old female with satisfactory annual exam.    ICD-10-CM    1. Encounter for gynecological examination without abnormal finding  Z01.419 Pap thin layer screen with HPV - recommended age 30 - 65 years   2. Encounter  for lipid screening for cardiovascular disease  Z13.220 Lipid panel reflex to direct LDL Fasting    Z13.6 Lipid panel reflex to direct LDL Fasting   3. Screening for metabolic disorder  Z13.228 Comprehensive metabolic panel     Hemoglobin  A1c     Comprehensive metabolic panel     Hemoglobin  A1c   4. Screening for disorder of blood and blood-forming organs  Z13.0 CBC with Platelets     CRP, inflammation     CBC with Platelets     CRP, inflammation   5. ASCUS with positive high risk HPV cervical  R87.610     R87.810    6. Screening for thyroid disorder  Z13.29 TSH with free T4 reflex     TSH with free T4 reflex   7. Encounter for vitamin deficiency screening  Z13.21 Vitamin D Deficiency     Vitamin B6     Vitamin B12     Vitamin B3     Vitamin D Deficiency     Vitamin B6     Vitamin B12     Vitamin B3   8. Slow transit constipation  K59.01 Physical Therapy Referral   9. Anxiety about health  F41.8 Adult Mental Health  Referral       PLAN:  41-year-old female with minimal tenderness on exam today.  Pap smear was updated and if it is normal she can repeat in 1 year due to her abnormal history.  We encouraged her to return to the office later this summer for her first mammogram.  Blood work will be done today.  We did send a referral for mental health and pelvic floor therapy.  We encouraged the patient to establish care with a primary care provider.    SANNA Boswell CNP

## 2022-04-20 ENCOUNTER — OFFICE VISIT (OUTPATIENT)
Dept: OBGYN | Facility: CLINIC | Age: 42
End: 2022-04-20
Payer: COMMERCIAL

## 2022-04-20 VITALS
HEIGHT: 68 IN | WEIGHT: 205 LBS | SYSTOLIC BLOOD PRESSURE: 124 MMHG | DIASTOLIC BLOOD PRESSURE: 84 MMHG | BODY MASS INDEX: 31.07 KG/M2

## 2022-04-20 DIAGNOSIS — R45.89 ANXIETY ABOUT HEALTH: ICD-10-CM

## 2022-04-20 DIAGNOSIS — K59.01 SLOW TRANSIT CONSTIPATION: ICD-10-CM

## 2022-04-20 DIAGNOSIS — Z13.220 ENCOUNTER FOR LIPID SCREENING FOR CARDIOVASCULAR DISEASE: ICD-10-CM

## 2022-04-20 DIAGNOSIS — Z13.21 ENCOUNTER FOR VITAMIN DEFICIENCY SCREENING: ICD-10-CM

## 2022-04-20 DIAGNOSIS — Z13.29 SCREENING FOR THYROID DISORDER: ICD-10-CM

## 2022-04-20 DIAGNOSIS — Z13.6 ENCOUNTER FOR LIPID SCREENING FOR CARDIOVASCULAR DISEASE: ICD-10-CM

## 2022-04-20 DIAGNOSIS — Z13.228 SCREENING FOR METABOLIC DISORDER: ICD-10-CM

## 2022-04-20 DIAGNOSIS — R87.610 ASCUS WITH POSITIVE HIGH RISK HPV CERVICAL: ICD-10-CM

## 2022-04-20 DIAGNOSIS — Z01.419 ENCOUNTER FOR GYNECOLOGICAL EXAMINATION WITHOUT ABNORMAL FINDING: Primary | ICD-10-CM

## 2022-04-20 DIAGNOSIS — Z13.0 SCREENING FOR DISORDER OF BLOOD AND BLOOD-FORMING ORGANS: ICD-10-CM

## 2022-04-20 DIAGNOSIS — R87.810 ASCUS WITH POSITIVE HIGH RISK HPV CERVICAL: ICD-10-CM

## 2022-04-20 LAB
CRP SERPL-MCNC: 8.1 MG/L (ref 0–8)
ERYTHROCYTE [DISTWIDTH] IN BLOOD BY AUTOMATED COUNT: 12.6 % (ref 10–15)
HBA1C MFR BLD: 5.9 % (ref 0–5.6)
HCT VFR BLD AUTO: 39.1 % (ref 35–47)
HGB BLD-MCNC: 12.1 G/DL (ref 11.7–15.7)
MCH RBC QN AUTO: 28.7 PG (ref 26.5–33)
MCHC RBC AUTO-ENTMCNC: 30.9 G/DL (ref 31.5–36.5)
MCV RBC AUTO: 93 FL (ref 78–100)
PLATELET # BLD AUTO: 366 10E3/UL (ref 150–450)
RBC # BLD AUTO: 4.22 10E6/UL (ref 3.8–5.2)
VIT B12 SERPL-MCNC: 430 PG/ML (ref 193–986)
WBC # BLD AUTO: 6 10E3/UL (ref 4–11)

## 2022-04-20 PROCEDURE — 80061 LIPID PANEL: CPT | Performed by: NURSE PRACTITIONER

## 2022-04-20 PROCEDURE — 84443 ASSAY THYROID STIM HORMONE: CPT | Performed by: NURSE PRACTITIONER

## 2022-04-20 PROCEDURE — 80053 COMPREHEN METABOLIC PANEL: CPT | Performed by: NURSE PRACTITIONER

## 2022-04-20 PROCEDURE — 88175 CYTOPATH C/V AUTO FLUID REDO: CPT | Performed by: NURSE PRACTITIONER

## 2022-04-20 PROCEDURE — 99395 PREV VISIT EST AGE 18-39: CPT | Performed by: NURSE PRACTITIONER

## 2022-04-20 PROCEDURE — 85027 COMPLETE CBC AUTOMATED: CPT | Performed by: NURSE PRACTITIONER

## 2022-04-20 PROCEDURE — 82607 VITAMIN B-12: CPT | Performed by: NURSE PRACTITIONER

## 2022-04-20 PROCEDURE — 84207 ASSAY OF VITAMIN B-6: CPT | Mod: 90 | Performed by: NURSE PRACTITIONER

## 2022-04-20 PROCEDURE — 36415 COLL VENOUS BLD VENIPUNCTURE: CPT | Performed by: NURSE PRACTITIONER

## 2022-04-20 PROCEDURE — 86140 C-REACTIVE PROTEIN: CPT | Performed by: NURSE PRACTITIONER

## 2022-04-20 PROCEDURE — 82306 VITAMIN D 25 HYDROXY: CPT | Performed by: NURSE PRACTITIONER

## 2022-04-20 PROCEDURE — 87624 HPV HI-RISK TYP POOLED RSLT: CPT | Performed by: NURSE PRACTITIONER

## 2022-04-20 PROCEDURE — 84591 ASSAY OF NOS VITAMIN: CPT | Mod: 90 | Performed by: NURSE PRACTITIONER

## 2022-04-20 PROCEDURE — 83036 HEMOGLOBIN GLYCOSYLATED A1C: CPT | Performed by: NURSE PRACTITIONER

## 2022-04-20 PROCEDURE — 99000 SPECIMEN HANDLING OFFICE-LAB: CPT | Performed by: NURSE PRACTITIONER

## 2022-04-20 RX ORDER — ERGOCALCIFEROL (VITAMIN D2) 10 MCG
TABLET ORAL
COMMUNITY

## 2022-04-20 RX ORDER — MULTIPLE VITAMINS W/ MINERALS TAB 9MG-400MCG
1 TAB ORAL DAILY
COMMUNITY
End: 2023-06-15

## 2022-04-21 LAB
ALBUMIN SERPL-MCNC: 4.1 G/DL (ref 3.4–5)
ALP SERPL-CCNC: 102 U/L (ref 40–150)
ALT SERPL W P-5'-P-CCNC: 30 U/L (ref 0–50)
ANION GAP SERPL CALCULATED.3IONS-SCNC: 6 MMOL/L (ref 3–14)
AST SERPL W P-5'-P-CCNC: 15 U/L (ref 0–45)
BILIRUB SERPL-MCNC: 0.3 MG/DL (ref 0.2–1.3)
BUN SERPL-MCNC: 12 MG/DL (ref 7–30)
CALCIUM SERPL-MCNC: 9.1 MG/DL (ref 8.5–10.1)
CHLORIDE BLD-SCNC: 106 MMOL/L (ref 94–109)
CHOLEST SERPL-MCNC: 179 MG/DL
CO2 SERPL-SCNC: 26 MMOL/L (ref 20–32)
CREAT SERPL-MCNC: 0.68 MG/DL (ref 0.52–1.04)
DEPRECATED CALCIDIOL+CALCIFEROL SERPL-MC: 34 UG/L (ref 20–75)
FASTING STATUS PATIENT QL REPORTED: YES
GFR SERPL CREATININE-BSD FRML MDRD: >90 ML/MIN/1.73M2
GLUCOSE BLD-MCNC: 101 MG/DL (ref 70–99)
HDLC SERPL-MCNC: 55 MG/DL
LDLC SERPL CALC-MCNC: 100 MG/DL
NONHDLC SERPL-MCNC: 124 MG/DL
POTASSIUM BLD-SCNC: 3.6 MMOL/L (ref 3.4–5.3)
PROT SERPL-MCNC: 7.9 G/DL (ref 6.8–8.8)
SODIUM SERPL-SCNC: 138 MMOL/L (ref 133–144)
TRIGL SERPL-MCNC: 122 MG/DL
TSH SERPL DL<=0.005 MIU/L-ACNC: 1.88 MU/L (ref 0.4–4)

## 2022-04-22 LAB
BKR LAB AP GYN ADEQUACY: NORMAL
BKR LAB AP GYN INTERPRETATION: NORMAL
BKR LAB AP HPV REFLEX: NORMAL
BKR LAB AP PREVIOUS ABNL DX: NORMAL
BKR LAB AP PREVIOUS ABNORMAL: NORMAL
PATH REPORT.COMMENTS IMP SPEC: NORMAL
PATH REPORT.COMMENTS IMP SPEC: NORMAL
PATH REPORT.RELEVANT HX SPEC: NORMAL
PYRIDOXAL PHOS SERPL-SCNC: 28.9 NMOL/L

## 2022-04-26 LAB
HUMAN PAPILLOMA VIRUS 16 DNA: NEGATIVE
HUMAN PAPILLOMA VIRUS 18 DNA: NEGATIVE
HUMAN PAPILLOMA VIRUS FINAL DIAGNOSIS: NORMAL
HUMAN PAPILLOMA VIRUS OTHER HR: NEGATIVE

## 2022-04-27 ENCOUNTER — PATIENT OUTREACH (OUTPATIENT)
Dept: OBGYN | Facility: CLINIC | Age: 42
End: 2022-04-27
Payer: COMMERCIAL

## 2022-05-02 ENCOUNTER — LAB REQUISITION (OUTPATIENT)
Dept: LAB | Facility: CLINIC | Age: 42
End: 2022-05-02

## 2022-05-02 PROCEDURE — 88342 IMHCHEM/IMCYTCHM 1ST ANTB: CPT | Mod: TC | Performed by: DENTIST

## 2022-05-03 LAB — NIACIN SERPL-MCNC: 2.66 UG/ML

## 2022-08-03 ENCOUNTER — ANCILLARY PROCEDURE (OUTPATIENT)
Dept: MAMMOGRAPHY | Facility: CLINIC | Age: 42
End: 2022-08-03
Attending: NURSE PRACTITIONER
Payer: COMMERCIAL

## 2022-08-03 DIAGNOSIS — Z12.31 VISIT FOR SCREENING MAMMOGRAM: ICD-10-CM

## 2022-08-03 PROCEDURE — 77067 SCR MAMMO BI INCL CAD: CPT | Mod: TC | Performed by: RADIOLOGY

## 2022-08-03 PROCEDURE — 77063 BREAST TOMOSYNTHESIS BI: CPT | Mod: TC | Performed by: RADIOLOGY

## 2022-10-16 ENCOUNTER — HEALTH MAINTENANCE LETTER (OUTPATIENT)
Age: 42
End: 2022-10-16

## 2023-04-03 ENCOUNTER — PATIENT OUTREACH (OUTPATIENT)
Dept: OBGYN | Facility: CLINIC | Age: 43
End: 2023-04-03
Payer: COMMERCIAL

## 2023-04-03 DIAGNOSIS — R87.810 ASCUS WITH POSITIVE HIGH RISK HPV CERVICAL: ICD-10-CM

## 2023-04-03 DIAGNOSIS — R87.610 ASCUS WITH POSITIVE HIGH RISK HPV CERVICAL: ICD-10-CM

## 2023-06-01 ENCOUNTER — HEALTH MAINTENANCE LETTER (OUTPATIENT)
Age: 43
End: 2023-06-01

## 2023-06-15 ENCOUNTER — OFFICE VISIT (OUTPATIENT)
Dept: OBGYN | Facility: CLINIC | Age: 43
End: 2023-06-15
Payer: COMMERCIAL

## 2023-06-15 VITALS
DIASTOLIC BLOOD PRESSURE: 80 MMHG | SYSTOLIC BLOOD PRESSURE: 124 MMHG | BODY MASS INDEX: 30.21 KG/M2 | HEIGHT: 69 IN | WEIGHT: 204 LBS

## 2023-06-15 DIAGNOSIS — Z13.228 SCREENING FOR METABOLIC DISORDER: ICD-10-CM

## 2023-06-15 DIAGNOSIS — Z13.220 ENCOUNTER FOR LIPID SCREENING FOR CARDIOVASCULAR DISEASE: ICD-10-CM

## 2023-06-15 DIAGNOSIS — Z13.29 SCREENING FOR THYROID DISORDER: ICD-10-CM

## 2023-06-15 DIAGNOSIS — Z01.419 ENCOUNTER FOR GYNECOLOGICAL EXAMINATION WITHOUT ABNORMAL FINDING: Primary | ICD-10-CM

## 2023-06-15 DIAGNOSIS — Z13.6 ENCOUNTER FOR LIPID SCREENING FOR CARDIOVASCULAR DISEASE: ICD-10-CM

## 2023-06-15 DIAGNOSIS — N80.9 ENDOMETRIOSIS: ICD-10-CM

## 2023-06-15 LAB
ALBUMIN SERPL BCG-MCNC: 4.6 G/DL (ref 3.5–5.2)
ALP SERPL-CCNC: 90 U/L (ref 35–104)
ALT SERPL W P-5'-P-CCNC: ABNORMAL U/L
ANION GAP SERPL CALCULATED.3IONS-SCNC: 12 MMOL/L (ref 7–15)
AST SERPL W P-5'-P-CCNC: ABNORMAL U/L
BILIRUB SERPL-MCNC: 0.3 MG/DL
BUN SERPL-MCNC: 10.6 MG/DL (ref 6–20)
CALCIUM SERPL-MCNC: 9.5 MG/DL (ref 8.6–10)
CHLORIDE SERPL-SCNC: 103 MMOL/L (ref 98–107)
CHOLEST SERPL-MCNC: 181 MG/DL
CREAT SERPL-MCNC: 0.71 MG/DL (ref 0.51–0.95)
DEPRECATED HCO3 PLAS-SCNC: 21 MMOL/L (ref 22–29)
GFR SERPL CREATININE-BSD FRML MDRD: >90 ML/MIN/1.73M2
GLUCOSE SERPL-MCNC: 100 MG/DL (ref 70–99)
HBA1C MFR BLD: 5.9 % (ref 0–5.6)
HDLC SERPL-MCNC: 49 MG/DL
LDLC SERPL CALC-MCNC: 111 MG/DL
NONHDLC SERPL-MCNC: 132 MG/DL
POTASSIUM SERPL-SCNC: 4.6 MMOL/L (ref 3.4–5.3)
PROT SERPL-MCNC: 7.8 G/DL (ref 6.4–8.3)
SODIUM SERPL-SCNC: 136 MMOL/L (ref 136–145)
TRIGL SERPL-MCNC: 104 MG/DL
TSH SERPL DL<=0.005 MIU/L-ACNC: 1.51 UIU/ML (ref 0.3–4.2)

## 2023-06-15 PROCEDURE — 80048 BASIC METABOLIC PNL TOTAL CA: CPT | Performed by: NURSE PRACTITIONER

## 2023-06-15 PROCEDURE — 84443 ASSAY THYROID STIM HORMONE: CPT | Performed by: NURSE PRACTITIONER

## 2023-06-15 PROCEDURE — 82040 ASSAY OF SERUM ALBUMIN: CPT | Performed by: NURSE PRACTITIONER

## 2023-06-15 PROCEDURE — 80061 LIPID PANEL: CPT | Performed by: NURSE PRACTITIONER

## 2023-06-15 PROCEDURE — 87624 HPV HI-RISK TYP POOLED RSLT: CPT | Performed by: NURSE PRACTITIONER

## 2023-06-15 PROCEDURE — 84075 ASSAY ALKALINE PHOSPHATASE: CPT | Performed by: NURSE PRACTITIONER

## 2023-06-15 PROCEDURE — G0145 SCR C/V CYTO,THINLAYER,RESCR: HCPCS | Performed by: NURSE PRACTITIONER

## 2023-06-15 PROCEDURE — 83036 HEMOGLOBIN GLYCOSYLATED A1C: CPT | Performed by: NURSE PRACTITIONER

## 2023-06-15 PROCEDURE — 36415 COLL VENOUS BLD VENIPUNCTURE: CPT | Performed by: NURSE PRACTITIONER

## 2023-06-15 PROCEDURE — 82247 BILIRUBIN TOTAL: CPT | Performed by: NURSE PRACTITIONER

## 2023-06-15 PROCEDURE — 84155 ASSAY OF PROTEIN SERUM: CPT | Performed by: NURSE PRACTITIONER

## 2023-06-15 PROCEDURE — 99396 PREV VISIT EST AGE 40-64: CPT | Performed by: NURSE PRACTITIONER

## 2023-06-15 RX ORDER — MULTIVITAMIN WITH IRON
TABLET ORAL
COMMUNITY

## 2023-06-15 ASSESSMENT — ANXIETY QUESTIONNAIRES
6. BECOMING EASILY ANNOYED OR IRRITABLE: NOT AT ALL
GAD7 TOTAL SCORE: 2
2. NOT BEING ABLE TO STOP OR CONTROL WORRYING: NOT AT ALL
3. WORRYING TOO MUCH ABOUT DIFFERENT THINGS: NOT AT ALL
7. FEELING AFRAID AS IF SOMETHING AWFUL MIGHT HAPPEN: NOT AT ALL
IF YOU CHECKED OFF ANY PROBLEMS ON THIS QUESTIONNAIRE, HOW DIFFICULT HAVE THESE PROBLEMS MADE IT FOR YOU TO DO YOUR WORK, TAKE CARE OF THINGS AT HOME, OR GET ALONG WITH OTHER PEOPLE: SOMEWHAT DIFFICULT
GAD7 TOTAL SCORE: 2
1. FEELING NERVOUS, ANXIOUS, OR ON EDGE: SEVERAL DAYS
5. BEING SO RESTLESS THAT IT IS HARD TO SIT STILL: NOT AT ALL

## 2023-06-15 ASSESSMENT — PATIENT HEALTH QUESTIONNAIRE - PHQ9
5. POOR APPETITE OR OVEREATING: SEVERAL DAYS
SUM OF ALL RESPONSES TO PHQ QUESTIONS 1-9: 2

## 2023-06-19 LAB
BKR LAB AP GYN ADEQUACY: NORMAL
BKR LAB AP GYN INTERPRETATION: NORMAL
BKR LAB AP HPV REFLEX: NORMAL
BKR LAB AP PREVIOUS ABNORMAL: NORMAL
PATH REPORT.COMMENTS IMP SPEC: NORMAL
PATH REPORT.COMMENTS IMP SPEC: NORMAL
PATH REPORT.RELEVANT HX SPEC: NORMAL

## 2023-08-04 ENCOUNTER — ANCILLARY PROCEDURE (OUTPATIENT)
Dept: MAMMOGRAPHY | Facility: CLINIC | Age: 43
End: 2023-08-04
Payer: COMMERCIAL

## 2023-08-04 DIAGNOSIS — Z12.31 VISIT FOR SCREENING MAMMOGRAM: ICD-10-CM

## 2023-08-04 PROCEDURE — 77063 BREAST TOMOSYNTHESIS BI: CPT | Mod: TC | Performed by: RADIOLOGY

## 2023-08-04 PROCEDURE — 77067 SCR MAMMO BI INCL CAD: CPT | Mod: TC | Performed by: RADIOLOGY

## 2023-08-31 ENCOUNTER — ANCILLARY PROCEDURE (OUTPATIENT)
Dept: ULTRASOUND IMAGING | Facility: CLINIC | Age: 43
End: 2023-08-31
Attending: NURSE PRACTITIONER
Payer: COMMERCIAL

## 2023-08-31 ENCOUNTER — OFFICE VISIT (OUTPATIENT)
Dept: OBGYN | Facility: CLINIC | Age: 43
End: 2023-08-31
Attending: NURSE PRACTITIONER
Payer: COMMERCIAL

## 2023-08-31 VITALS
WEIGHT: 202 LBS | SYSTOLIC BLOOD PRESSURE: 128 MMHG | DIASTOLIC BLOOD PRESSURE: 74 MMHG | HEIGHT: 69 IN | BODY MASS INDEX: 29.92 KG/M2

## 2023-08-31 DIAGNOSIS — N80.9 ENDOMETRIOSIS: ICD-10-CM

## 2023-08-31 DIAGNOSIS — N83.291 COMPLEX CYST OF RIGHT OVARY: Primary | ICD-10-CM

## 2023-08-31 DIAGNOSIS — D25.9 UTERINE LEIOMYOMA, UNSPECIFIED LOCATION: ICD-10-CM

## 2023-08-31 LAB — CANCER AG125 SERPL-ACNC: 188 U/ML

## 2023-08-31 PROCEDURE — 99213 OFFICE O/P EST LOW 20 MIN: CPT | Performed by: NURSE PRACTITIONER

## 2023-08-31 PROCEDURE — 86304 IMMUNOASSAY TUMOR CA 125: CPT | Performed by: NURSE PRACTITIONER

## 2023-08-31 PROCEDURE — 76830 TRANSVAGINAL US NON-OB: CPT | Performed by: OBSTETRICS & GYNECOLOGY

## 2023-08-31 PROCEDURE — 36415 COLL VENOUS BLD VENIPUNCTURE: CPT | Performed by: NURSE PRACTITIONER

## 2023-08-31 NOTE — PROGRESS NOTES
SUBJECTIVE:                                                   Shayy Ndiaye is a 43 year old female who presents to clinic today for the following health issue(s):  Patient presents with:  Ultrasound    HPI:  Patient here today for ultrasound evaluation of ongoing pelvic pain specifically fullness on the left side of the pelvis at her annual exam.  She has a history of endometriosis and has had multiple laparoscopies.    She started her cycle today and it is very light.  She states that bowel movements during her cycle are excruciating and she does prep the week before her cycle with MiraLAX.    She is not currently sexually active.  She is unsure of her fertility desires.    She is down on her weight unintentionally but does state that she has little to no appetite by the end of the day due to abdominal fullness.    Patient's last menstrual period was 2023.    Patient is not sexually active, .  Using not sexually active for contraception.    reports that she has never smoked. She has never used smokeless tobacco.    STD testing offered?  Declined    Health maintenance updated:  yes    Today's PHQ-2 Score:       6/15/2023    11:02 AM   PHQ-2 (  [employer])   Q1: Little interest or pleasure in doing things 0   Q2: Feeling down, depressed or hopeless 1   PHQ-2 Score 1     Today's PHQ-9 Score:       6/15/2023    11:02 AM   PHQ-9 SCORE   PHQ-9 Total Score 2     Today's PAYAM-7 Score:       6/15/2023    11:02 AM   PAYAM-7 SCORE   Total Score 2       Problem list and histories reviewed & adjusted, as indicated.  Additional history: as documented.    Patient Active Problem List   Diagnosis    Endometriosis    ASCUS with positive high risk HPV cervical    Endometrial polyp    Complex cyst of left ovary     Past Surgical History:   Procedure Laterality Date    D & C      elected AB    DILATION AND CURETTAGE, HYSTEROSCOPY DIAGNOSTIC, COMBINED N/A 10/6/2020    Procedure: HYSTEROSCOPY, DILATATION CURETTAGE;   "Surgeon: Mino Urias MD;  Location:  OR    HC TOOTH EXTRACTION W/FORCEP      LAPAROSCOPIC LYSIS ADHESIONS N/A 10/6/2020    Procedure: LYSIS OF ADHESIONS;  Surgeon: Mino Urias MD;  Location:  OR    LAPAROSCOPY DIAGNOSTIC (GYN)  05/2008    endo    LAPAROSCOPY DIAGNOSTIC (GYN)  09/2008    endo    LASER CO2 LAPAROSCOPIC VAPORIZATION ENDOMETRIUM N/A 10/6/2020    Procedure: LAPAROSCOPY WITH CO2 LASER, LASER VAPORIZATION OF ENDOMETRIOSIS;  Surgeon: Mino Urias MD;  Location:  OR    TONSILLECTOMY & ADENOIDECTOMY        Social History     Tobacco Use    Smoking status: Never    Smokeless tobacco: Never   Substance Use Topics    Alcohol use: Yes     Comment: Occas       Problem (# of Occurrences) Relation (Name,Age of Onset)    Diabetes (2) Father, Paternal Aunt    Hyperlipidemia (1) Mother    No Known Problems (7) Sister, Brother, Maternal Grandmother, Maternal Grandfather, Paternal Grandmother, Paternal Grandfather, Other              Current Outpatient Medications   Medication Sig    magnesium 250 MG tablet     Naproxen Sodium (ALEVE PO)     Vitamin D, Cholecalciferol, 10 MCG (400 UNIT) TABS      No current facility-administered medications for this visit.     Allergies   Allergen Reactions    Dust Mite Extract      STUFFY NOSE    Erythromycin     Minocycline Unknown and Nausea       ROS:  12 point review of systems negative other than symptoms noted below or in the HPI.  No urinary frequency or dysuria, bladder or kidney problems      OBJECTIVE:     /74   Ht 1.74 m (5' 8.5\")   Wt 91.6 kg (202 lb)   LMP 08/31/2023   BMI 30.27 kg/m    Body mass index is 30.27 kg/m .    Exam:  Constitutional:  Appearance: Well nourished, well developed alert, in no acute distress  Psychiatric:  Mentation appears normal and affect normal/bright.     In-Clinic Test Results:  No results found for this or any previous visit (from the past 24 hour(s)).    Results for orders placed or performed in visit on " 08/31/23        Status: Abnormal   Result Value Ref Range     188 (H) <=38 U/mL    Narrative    This result is obtained using the Roche Elecsys  II method on the arthur e801 immunoassay analyzer. Results obtained with different assay methods or kits cannot be used interchangeably.   Results for orders placed or performed in visit on 08/31/23   US Transvaginal Pelvic Non-OB     Status: None    Narrative    Table formatting from the original result was not included.     US Transvaginal Pelvic Non-OB  Order #: [id number] Accession #: [id number]  Study Notes     Spurling, Brittnee on 8/31/2023  2:05 PM      Gynecological Ultrasound Report  Pelvic U/S - Transvaginal  [facility]  Referring Provider: Serina Palacios CNP   Sonographer:  Brittnee Spurling, RDMS  Indication: Endometriosis  LMP: No LMP recorded.  History:   Gynecological Ultrasonography:   Uterus: anteverted. Contour is irregular w/ myomata: 1 Left Pedunculated   4.3 x 3.3 x 3.0 cm.  Size: 7.0 x 4.2 x 3.3 cm  Endometrium: Thickness Total 7.4 mm  Findings: uterine fibroid sits against lt ovary  Right Ovary: 4.1 x 2.9 x 2.4 cm. Complex cyst 1.9 x 2.0 x 1.6 cm  Left Ovary: 6.4 x 3.9 x 2.8 cm. Cluster of cysts 5.6 x 2.7 x 5.3 cm  Cul de Sac Free Fluid: No free fluid     Impression:          Anteverted uterus with left 4.3 cm pedunculated fibroid.  Right ovary with 2 cm complex cyst  Left ovary with a cluster of cysts measuring 5.6 cm in total.  Largest   cyst within measuring approximately 2cm    Recommend repeat US in 2-3 months to ensure resolution.    Carolyn Bower MD on 8/31/2023 at 3:53 PM           ASSESSMENT/PLAN:                                                        ICD-10-CM    1. Complex cyst of right ovary  N83.291            2. Endometriosis  N80.9       3. Uterine leiomyoma, unspecified location  D25.9           There are no Patient Instructions on file for this visit.  43-year-old female with a uterine  fibroid measuring 4.28 cm that seems to be on the left side.  She has a right complex ovarian cyst measuring 1.9 cm.  She is a cluster of simple cyst on the left ovary measuring 5.6 cm.  She is requesting a  for her reassurance and we will do this today.  We have given her referral for surgical consultation.    SANNA Boswell CNP  [facility]

## 2023-09-05 ENCOUNTER — HOSPITAL ENCOUNTER (OUTPATIENT)
Dept: MRI IMAGING | Facility: HOSPITAL | Age: 43
Discharge: HOME | End: 2023-09-05
Attending: NURSE PRACTITIONER | Admitting: NURSE PRACTITIONER
Payer: COMMERCIAL

## 2023-09-05 DIAGNOSIS — N80.9 ENDOMETRIOSIS: ICD-10-CM

## 2023-09-05 DIAGNOSIS — N83.291 COMPLEX CYST OF RIGHT OVARY: ICD-10-CM

## 2023-09-05 PROCEDURE — 72197 MRI PELVIS W/O & W/DYE: CPT

## 2023-09-05 PROCEDURE — A9585 GADOBUTROL INJECTION: HCPCS | Mod: JZ | Performed by: NURSE PRACTITIONER

## 2023-09-05 PROCEDURE — 255N000002 HC RX 255 OP 636: Mod: JZ | Performed by: NURSE PRACTITIONER

## 2023-09-05 RX ORDER — GADOBUTROL 604.72 MG/ML
9 INJECTION INTRAVENOUS ONCE
Status: COMPLETED | OUTPATIENT
Start: 2023-09-05 | End: 2023-09-05

## 2023-09-05 RX ADMIN — GADOBUTROL 9 ML: 604.72 INJECTION INTRAVENOUS at 20:54

## 2023-09-07 ENCOUNTER — MYC MEDICAL ADVICE (OUTPATIENT)
Dept: OBGYN | Facility: CLINIC | Age: 43
End: 2023-09-07
Payer: COMMERCIAL

## 2023-09-07 DIAGNOSIS — N83.291 COMPLEX CYST OF RIGHT OVARY: Primary | ICD-10-CM

## 2023-09-07 DIAGNOSIS — R97.8 ELEVATED TUMOR MARKERS: ICD-10-CM

## 2023-09-08 ENCOUNTER — PATIENT OUTREACH (OUTPATIENT)
Dept: ONCOLOGY | Facility: CLINIC | Age: 43
End: 2023-09-08
Payer: COMMERCIAL

## 2023-09-08 ENCOUNTER — MYC MEDICAL ADVICE (OUTPATIENT)
Dept: ONCOLOGY | Facility: CLINIC | Age: 43
End: 2023-09-08
Payer: COMMERCIAL

## 2023-09-08 NOTE — TELEPHONE ENCOUNTER
RECORDS STATUS - ALL OTHER DIAGNOSIS      RECORDS RECEIVED FROM: [facility]   DATE RECEIVED:    NOTES STATUS DETAILS   OFFICE NOTE from referring provider Epic 8/31/23: Dr. Serina Palacios   OFFICE NOTE from other specialist Epic 6/8/20: Dr. Mino Urias   DISCHARGE SUMMARY from hospital [facility] 10/6/20: [facility]   OPERATIVE REPORT [facility] 10/6/20: HYSTEROSCOPY, DILATATION CURETTAGE    MEDICATION LIST [facility]    LABS     PATHOLOGY REPORTS [facility] 10/6/20: [id number]   5/1/08: [id number] (negative)   ANYTHING RELATED TO DIAGNOSIS Epic    IMAGING (NEED IMAGES & REPORT)     MRI PACS 9/5/23: MR Pelvis   ULTRASOUND PACS 8/31/23: US Pelvis

## 2023-09-08 NOTE — PROGRESS NOTES
New Patient Hematology / Oncology Nurse Navigator Note     Referral Date: 9/7/23    Referring provider:   Serina Palacios, APRN CNP   [address]   [place] [address]   Phone: [phone]   Fax: [phone]       Referring Clinic/Organization: [facility]     Referred to: GynOnc    Requested provider (if applicable): Dr. Gonzalez    Evaluation for : Adnexal mass, elevated      Clinical History (per Nurse review of records provided):    8/31 Elevated  (188) -- BOOKMARKED  8/31 US, 9/5 MRI showing:  IMPRESSION:  1.  A complex cystic and solid 7.1 cm left adnexal mass. The differential diagnosis includes loculated heterogeneous peritoneal inclusion cyst and ovarian neoplasm. Recommend gynecology consultation and consideration of surgical evaluation.-- BOOKMARKED  8/31 Office Visit with OBGYN--BOOKMARKED  6/15/23 PAP/HPV -- BOOKMARKED    Clinical Assessment / Barriers to Care (Per Nurse):  Pt lives in [facility]     Records Location: [facility]     Records Needed:   N/A    Additional testing needed prior to consult:   N/A    Referral updates and Plan:   IB to Dr. Gonzalez re: adding patient to schedule as no openings for a few weeks. MyChart to patient with update that Nurse Navigator is reviewing with Dr. Gonzalez and working on appointment option.     Aleyda Headley, BSN, RN, PHN, OCN  Hematology/Oncology Nurse Navigator  [facility]  [phone]

## 2023-09-11 NOTE — PROGRESS NOTES
Consult Notes on Referred Patient        Dr. Serina Palacios, APRN CNP  [address] [facility] [address]  [place] [address]       RE: Shayy Ndiaye  : 1980  SILVIANO: Sep 12, 2023    Dear Dr. Serina Palacios:    I had the pleasure of seeing your patient Shayy Ndiaye here at the Gynecologic Cancer Clinic at the [facility] on 2023.  As you know she is a very pleasant 43 year old woman with a diagnosis of pelvic mass and elevated .  Given these findings she was subsequently sent to the Gynecologic Cancer Clinic for new patient consultation.  She is unaccompanied today.    She has been noticing significant bowel changes.  She has been having difficulty and pain when passing a bowel movement.  She continues to have bowel movements but just notes a change recently.  No blood in the stool.  She has also had an increase in her pelvic pain from her baseline. Increased urination with incontinence at the end of the stream.  Regular menses without significant changes other than more dysmenorrhea.  She has had a decreased appetite but has not had nausea.  She has tried Lupron for 18 months in the past with terrible side effects.  She has also been on OCP for 5-6 years with significant side effects and thus she stopped.    23:   = 188    23:  US pelvis (personally reviewed):  Uterus: anteverted. Contour is irregular w/ myomata: 1 Left Pedunculated 4.3 x 3.3 x 3.0 cm. Size: 7.0 x 4.2 x 3.3 cm Endometrium: Thickness Total 7.4 mm Findings: uterine fibroid sits against lt ovary Right Ovary: 4.1 x 2.9 x 2.4 cm. Complex cyst 1.9 x 2.0 x 1.6 cm Left Ovary: 6.4 x 3.9 x 2.8 cm. Cluster of cysts 5.6 x 2.7 x 5.3 cm Cul de Sac Free Fluid: No free fluid    23:  MRI Pelvis (personally reviewed):  UTERUS: Anteverted measuring 7.8 x 3.5 x 4.7 cm. ENDOMETRIUM: 3 mm in thickness. The junctional zone is within normal limits. ADNEXA: A heterogeneous left adnexal mass arising from the left ovary measures 7.1  x 6.2 x 4.4 cm. The mass surrounds the adjacent sigmoid colon. The mass has cystic and solid components. The solid components demonstrate heterogeneous enhancement. The cystic components contain fluid/fluid levels and T1 hyperintense material which either represents proteinaceous debris or blood. No normal left ovarian tissue visualized. The right ovary is normal measuring 1.8 x 1.7 x 3.0 cm. ADDITIONAL FINDINGS: No lymphadenopathy. Trace free fluid in the pelvis. No osseous lesion.      Obstetrics and Gynecology History:  G0  Patient is comfortable with no longer preserving fertility if that is the safest option.      Past Medical History:  Past Medical History:   Diagnosis Date    Anxiety     Endometriosis     Motion sickness        Past Surgical History:  Past Surgical History:   Procedure Laterality Date    D & C  1999    elected AB    DILATION AND CURETTAGE, HYSTEROSCOPY DIAGNOSTIC, COMBINED N/A 10/6/2020    Procedure: HYSTEROSCOPY, DILATATION CURETTAGE;  Surgeon: Mino Urias MD;  Location:  OR    HC TOOTH EXTRACTION W/FORCEP      LAPAROSCOPIC LYSIS ADHESIONS N/A 10/6/2020    Procedure: LYSIS OF ADHESIONS;  Surgeon: Mino Urias MD;  Location:  OR    LAPAROSCOPY DIAGNOSTIC (GYN)  05/2008    endo    LAPAROSCOPY DIAGNOSTIC (GYN)  09/2008    endo    LASER CO2 LAPAROSCOPIC VAPORIZATION ENDOMETRIUM N/A 10/6/2020    Procedure: LAPAROSCOPY WITH CO2 LASER, LASER VAPORIZATION OF ENDOMETRIOSIS;  Surgeon: Mino Urias MD;  Location:  OR    TONSILLECTOMY & ADENOIDECTOMY         Health Maintenance:  Last Pap Smear: 6/15/23              Result: Negative, HPV negative  She has not had a history of abnormal Pap smears.    Last Mammogram: 8/4/23              Result: normal      She has not had a history of abnormal mammograms.    Last Colonoscopy: N/A       Social History:  Lives with her, feels safe at home.  Works in IT.  Enjoys online video games.  Does not have an advanced directive on file and would  "like her mother to be her POA.  Full code.    Family History:   The patient's family history is significant for.  Family History   Problem Relation Age of Onset    Hyperlipidemia Mother     Breast Cancer Mother     Diabetes Father     No Known Problems Sister     No Known Problems Brother     No Known Problems Maternal Grandmother     No Known Problems Maternal Grandfather     Bone Cancer Paternal Grandmother     No Known Problems Paternal Grandfather     Diabetes Paternal Aunt     No Known Problems Other     Breast Cancer Maternal Aunt     Cancer Maternal Uncle         head and neck         Physical Exam:   BP (!) 165/103   Pulse 114   Resp 16   Ht 1.74 m (5' 8.5\")   Wt 90.6 kg (199 lb 11.2 oz)   LMP 08/31/2023   SpO2 96%   BMI 29.92 kg/m    GENERAL: Healthy, alert and no distress  EYES: Eyes grossly normal to inspection.  No discharge or erythema, or obvious scleral/conjunctival abnormalities.  RESP: No audible wheeze, cough, or visible cyanosis.  No visible retractions or increased work of breathing.    SKIN: Visible skin clear. No significant rash, abnormal pigmentation or lesions.  NEURO: Cranial nerves grossly intact.  Mentation and speech appropriate for age.  PSYCH: Mentation appears normal, affect normal/bright, judgement and insight intact, normal speech and appearance well-groomed.     Labs:  None      Assessment:    Shayy Ndiaye is a 43 year old woman with a diagnosis of ovarian mass with elevated .     A total of 60 minutes was spent on patient care today.       Plan:     1.)    Ovarian mass, elevated -lu reviewed possible etiologies including benign (endometriosis), borderline and malignant.  We discussed the need to obtain a CT given the elevated  is concerning for malignancy in order to rule out metastatic disease.  We also discussed that given her significant bowel symptoms and appearance on MRI, I would like to obtain a colonoscopy to evaluate for intraluminal involvement or " narrowing to prepare for surgical intervention.  Given the elevated , I would recommend surgical resection.  We discussed that I would attempt a laparoscopic approach but depending upon the findings, a laparotomy may be required.  We discussed that depending upon the involvement of the sigmoid colon she may require bowel resection or even ostomy formation.  We discussed that often with severe endometriosis, it is not safe or feasible to resect a single ovary and tube and that a full hysterectomy with removal of both ovaries and fallopian tubes is required.  We thus discussed the option of ERT following surgery depending upon the surgical findings.  We also discussed the role of frozen section and that if malignancy was identified that the goal of surgery would be to remove all visible disease.  We also discussed staging procedures in the case of a malignancy in which there is no obvious metastatic disease.  She will have a CT and colonoscopy and return for surgical planning in 2 weeks.  She has a history of complications from anesthesia and thus would benefit from a PAC consult prior to surgery.     2.) Genetic risk factors were assessed and the patient does not meet the qualifications for a referral unless malignancy is identified.      3.) Labs and/or tests ordered include:  CT C/A/P, colonoscopy     4.) Health maintenance issues addressed today include pt is up to date.        Thank you for allowing us to participate in the care of your patient.         Sincerely,    Radha Gonzalez MD  Gynecologic Oncology  [facility]       [place]

## 2023-09-12 ENCOUNTER — PRE VISIT (OUTPATIENT)
Dept: ONCOLOGY | Facility: CLINIC | Age: 43
End: 2023-09-12
Payer: COMMERCIAL

## 2023-09-12 ENCOUNTER — ONCOLOGY VISIT (OUTPATIENT)
Dept: ONCOLOGY | Facility: CLINIC | Age: 43
End: 2023-09-12
Attending: NURSE PRACTITIONER
Payer: COMMERCIAL

## 2023-09-12 VITALS
WEIGHT: 199.7 LBS | SYSTOLIC BLOOD PRESSURE: 165 MMHG | OXYGEN SATURATION: 96 % | BODY MASS INDEX: 29.58 KG/M2 | DIASTOLIC BLOOD PRESSURE: 103 MMHG | RESPIRATION RATE: 16 BRPM | HEART RATE: 114 BPM | HEIGHT: 69 IN

## 2023-09-12 DIAGNOSIS — R97.8 ELEVATED TUMOR MARKERS: ICD-10-CM

## 2023-09-12 DIAGNOSIS — N83.291 COMPLEX CYST OF RIGHT OVARY: Primary | ICD-10-CM

## 2023-09-12 PROCEDURE — 99205 OFFICE O/P NEW HI 60 MIN: CPT | Performed by: OBSTETRICS & GYNECOLOGY

## 2023-09-12 PROCEDURE — G0463 HOSPITAL OUTPT CLINIC VISIT: HCPCS | Performed by: OBSTETRICS & GYNECOLOGY

## 2023-09-12 ASSESSMENT — PAIN SCALES - GENERAL: PAINLEVEL: SEVERE PAIN (7)

## 2023-09-12 NOTE — LETTER
2023         RE: Shayy Ndiaye  [address]  [place] [address]        Dear Colleague,    Thank you for referring your patient, Shayy Ndiaye, to the [facility]. Please see a copy of my visit note below.    Consult Notes on Referred Patient        Dr. Serina Palacios, APRN CNP  [address]  [place] [address]       RE: Shayy Ndiaye  : 1980  SILVIANO: Sep 12, 2023    Dear Dr. Serina Palacios:    I had the pleasure of seeing your patient Shayy Ndiaye here at the Gynecologic Cancer Clinic at the [facility] on 2023.  As you know she is a very pleasant 43 year old woman with a diagnosis of pelvic mass and elevated .  Given these findings she was subsequently sent to the Gynecologic Cancer Clinic for new patient consultation.  She is unaccompanied today.    She has been noticing significant bowel changes.  She has been having difficulty and pain when passing a bowel movement.  She continues to have bowel movements but just notes a change recently.  No blood in the stool.  She has also had an increase in her pelvic pain from her baseline. Increased urination with incontinence at the end of the stream.  Regular menses without significant changes other than more dysmenorrhea.  She has had a decreased appetite but has not had nausea.  She has tried Lupron for 18 months in the past with terrible side effects.  She has also been on OCP for 5-6 years with significant side effects and thus she stopped.    23:   = 188    23:  US pelvis (personally reviewed):  Uterus: anteverted. Contour is irregular w/ myomata: 1 Left Pedunculated 4.3 x 3.3 x 3.0 cm. Size: 7.0 x 4.2 x 3.3 cm Endometrium: Thickness Total 7.4 mm Findings: uterine fibroid sits against lt ovary Right Ovary: 4.1 x 2.9 x 2.4 cm. Complex cyst 1.9 x 2.0 x 1.6 cm Left Ovary: 6.4 x 3.9 x 2.8 cm. Cluster of cysts 5.6 x 2.7 x 5.3 cm Cul de Sac Free Fluid: No  free fluid    9/5/23:  MRI Pelvis (personally reviewed):  UTERUS: Anteverted measuring 7.8 x 3.5 x 4.7 cm. ENDOMETRIUM: 3 mm in thickness. The junctional zone is within normal limits. ADNEXA: A heterogeneous left adnexal mass arising from the left ovary measures 7.1 x 6.2 x 4.4 cm. The mass surrounds the adjacent sigmoid colon. The mass has cystic and solid components. The solid components demonstrate heterogeneous enhancement. The cystic components contain fluid/fluid levels and T1 hyperintense material which either represents proteinaceous debris or blood. No normal left ovarian tissue visualized. The right ovary is normal measuring 1.8 x 1.7 x 3.0 cm. ADDITIONAL FINDINGS: No lymphadenopathy. Trace free fluid in the pelvis. No osseous lesion.      Obstetrics and Gynecology History:  G0  Patient is comfortable with no longer preserving fertility if that is the safest option.      Past Medical History:  Past Medical History:   Diagnosis Date     Anxiety      Endometriosis      Motion sickness        Past Surgical History:  Past Surgical History:   Procedure Laterality Date     D & C  1999    elected AB     DILATION AND CURETTAGE, HYSTEROSCOPY DIAGNOSTIC, COMBINED N/A 10/6/2020    Procedure: HYSTEROSCOPY, DILATATION CURETTAGE;  Surgeon: Mino Urias MD;  Location:  OR      TOOTH EXTRACTION W/FORCEP       LAPAROSCOPIC LYSIS ADHESIONS N/A 10/6/2020    Procedure: LYSIS OF ADHESIONS;  Surgeon: Mino Urias MD;  Location:  OR     LAPAROSCOPY DIAGNOSTIC (GYN)  05/2008    endo     LAPAROSCOPY DIAGNOSTIC (GYN)  09/2008    endo     LASER CO2 LAPAROSCOPIC VAPORIZATION ENDOMETRIUM N/A 10/6/2020    Procedure: LAPAROSCOPY WITH CO2 LASER, LASER VAPORIZATION OF ENDOMETRIOSIS;  Surgeon: Mino Urias MD;  Location:  OR     TONSILLECTOMY & ADENOIDECTOMY         Health Maintenance:  Last Pap Smear: 6/15/23              Result: Negative, HPV negative  She has not had a history of abnormal Pap smears.    Last  "Mammogram: 8/4/23              Result: normal      She has not had a history of abnormal mammograms.    Last Colonoscopy: N/A       Social History:  Lives with her, feels safe at home.  Works in IT.  Enjoys online video games.  Does not have an advanced directive on file and would like her mother to be her POA.  Full code.    Family History:   The patient's family history is significant for.  Family History   Problem Relation Age of Onset     Hyperlipidemia Mother      Breast Cancer Mother      Diabetes Father      No Known Problems Sister      No Known Problems Brother      No Known Problems Maternal Grandmother      No Known Problems Maternal Grandfather      Bone Cancer Paternal Grandmother      No Known Problems Paternal Grandfather      Diabetes Paternal Aunt      No Known Problems Other      Breast Cancer Maternal Aunt      Cancer Maternal Uncle         head and neck         Physical Exam:   BP (!) 165/103   Pulse 114   Resp 16   Ht 1.74 m (5' 8.5\")   Wt 90.6 kg (199 lb 11.2 oz)   LMP 08/31/2023   SpO2 96%   BMI 29.92 kg/m    GENERAL: Healthy, alert and no distress  EYES: Eyes grossly normal to inspection.  No discharge or erythema, or obvious scleral/conjunctival abnormalities.  RESP: No audible wheeze, cough, or visible cyanosis.  No visible retractions or increased work of breathing.    SKIN: Visible skin clear. No significant rash, abnormal pigmentation or lesions.  NEURO: Cranial nerves grossly intact.  Mentation and speech appropriate for age.  PSYCH: Mentation appears normal, affect normal/bright, judgement and insight intact, normal speech and appearance well-groomed.     Labs:  None      Assessment:    Shayy Ndiaye is a 43 year old woman with a diagnosis of ovarian mass with elevated .     A total of 60 minutes was spent on patient care today.       Plan:     1.)    Ovarian mass, elevated -xy reviewed possible etiologies including benign (endometriosis), borderline and malignant.  " We discussed the need to obtain a CT given the elevated  is concerning for malignancy in order to rule out metastatic disease.  We also discussed that given her significant bowel symptoms and appearance on MRI, I would like to obtain a colonoscopy to evaluate for intraluminal involvement or narrowing to prepare for surgical intervention.  Given the elevated , I would recommend surgical resection.  We discussed that I would attempt a laparoscopic approach but depending upon the findings, a laparotomy may be required.  We discussed that depending upon the involvement of the sigmoid colon she may require bowel resection or even ostomy formation.  We discussed that often with severe endometriosis, it is not safe or feasible to resect a single ovary and tube and that a full hysterectomy with removal of both ovaries and fallopian tubes is required.  We thus discussed the option of ERT following surgery depending upon the surgical findings.  We also discussed the role of frozen section and that if malignancy was identified that the goal of surgery would be to remove all visible disease.  We also discussed staging procedures in the case of a malignancy in which there is no obvious metastatic disease.  She will have a CT and colonoscopy and return for surgical planning in 2 weeks.  She has a history of complications from anesthesia and thus would benefit from a PAC consult prior to surgery.     2.) Genetic risk factors were assessed and the patient does not meet the qualifications for a referral unless malignancy is identified.      3.) Labs and/or tests ordered include:  CT C/A/P, colonoscopy     4.) Health maintenance issues addressed today include pt is up to date.        Thank you for allowing us to participate in the care of your patient.         Sincerely,    Radha Gonzalez MD  Gynecologic Oncology  [facility]       GEL[place] BELTRE      Again, thank you for allowing me to  participate in the care of your patient.        Sincerely,        Leodan Gonzalez MD

## 2023-09-12 NOTE — NURSING NOTE
"Oncology Rooming Note    September 12, 2023 11:57 AM   Shayy Ndiaye is a 43 year old female who presents for:    Chief Complaint   Patient presents with    Oncology Clinic Visit     Initial Vitals: BP (!) 165/103   Pulse 114   Resp 16   Ht 1.74 m (5' 8.5\")   Wt 90.6 kg (199 lb 11.2 oz)   LMP 08/31/2023   SpO2 96%   BMI 29.92 kg/m   Estimated body mass index is 29.92 kg/m  as calculated from the following:    Height as of this encounter: 1.74 m (5' 8.5\").    Weight as of this encounter: 90.6 kg (199 lb 11.2 oz). Body surface area is 2.09 meters squared.  Severe Pain (7) Comment: Data Unavailable   Patient's last menstrual period was 08/31/2023.  Allergies reviewed: Yes  Medications reviewed: Yes    Medications: Medication refills not needed today.  Ph[id number] [place] [address]    Clinical concerns: Fred Cevallos"

## 2023-09-13 NOTE — PATIENT INSTRUCTIONS
Colonoscopy 9/20 and CT C/A/P 9/15 ASAP     Next visit with Dr Gonzalez in 2 weeks (virtual or in person at [facility] per pt preference) 9/26/23  Brigid Montero RN on 9/20/2023 at 12:49 PM

## 2023-09-15 ENCOUNTER — PATIENT OUTREACH (OUTPATIENT)
Dept: ONCOLOGY | Facility: CLINIC | Age: 43
End: 2023-09-15

## 2023-09-15 ENCOUNTER — TELEPHONE (OUTPATIENT)
Dept: GASTROENTEROLOGY | Facility: CLINIC | Age: 43
End: 2023-09-15

## 2023-09-15 ENCOUNTER — TELEPHONE (OUTPATIENT)
Dept: GASTROENTEROLOGY | Facility: CLINIC | Age: 43
End: 2023-09-15
Payer: COMMERCIAL

## 2023-09-15 ENCOUNTER — ANCILLARY PROCEDURE (OUTPATIENT)
Dept: CT IMAGING | Facility: CLINIC | Age: 43
End: 2023-09-15
Attending: OBSTETRICS & GYNECOLOGY
Payer: COMMERCIAL

## 2023-09-15 DIAGNOSIS — N83.291 COMPLEX CYST OF RIGHT OVARY: ICD-10-CM

## 2023-09-15 DIAGNOSIS — R97.8 ELEVATED TUMOR MARKERS: Primary | ICD-10-CM

## 2023-09-15 PROCEDURE — 74177 CT ABD & PELVIS W/CONTRAST: CPT | Performed by: STUDENT IN AN ORGANIZED HEALTH CARE EDUCATION/TRAINING PROGRAM

## 2023-09-15 PROCEDURE — 71260 CT THORAX DX C+: CPT | Performed by: STUDENT IN AN ORGANIZED HEALTH CARE EDUCATION/TRAINING PROGRAM

## 2023-09-15 RX ORDER — BISACODYL 5 MG/1
TABLET, DELAYED RELEASE ORAL
Qty: 4 TABLET | Refills: 0 | Status: SHIPPED | OUTPATIENT
Start: 2023-09-15 | End: 2023-09-27

## 2023-09-15 RX ORDER — IOPAMIDOL 755 MG/ML
109 INJECTION, SOLUTION INTRAVASCULAR ONCE
Status: COMPLETED | OUTPATIENT
Start: 2023-09-15 | End: 2023-09-15

## 2023-09-15 RX ADMIN — IOPAMIDOL 109 ML: 755 INJECTION, SOLUTION INTRAVASCULAR at 15:23

## 2023-09-15 NOTE — PROGRESS NOTES
Pt scheduled her colonoscopy with Dr. Vin Rice for next week on 9/20/23.  Pt said it is located at the [address]  She wanted to double check to make sure Dr. Gonzalez is ok with her going there and having Dr. Rice do the colonoscopy.  She wants to know ASAP so she can change the appointment/provider if Dr. Gonzalez wants her to.  Writer will route high priority to Dr. Gonzalez for her advise.    Dr. Gonzalez confirmed it is ok for patient to have Colonoscopy with Dr. Rice at the  on 9/20/23.  Writer updated patient and she is happy with plan to keep that apt as scheduled.  No further questions or concerns at this time.    Juliana Maddox, RN, BSN    RN Care Coordinator  [facility]  [phone]

## 2023-09-15 NOTE — TELEPHONE ENCOUNTER
"Pre assessment completed for upcoming procedure.    Pt stated that her surgeon is still trying to \"figure out details\" of pt's procedure - she said surgeon needs to touch base with the attending and location. So pt was tearful and confused about everything it's taking to coordinate this procedure.     She had anxiety about getting ready for the prep if it would be enough time and with her having to work- This writer ensured pt that the prep is doable for the and the prep is drank in the evening so she should be able to work during the day. Although the prep does effect everyone differently.    Pt will call back if something changes with the appointment.    Procedure details:    Patient scheduled for Colonoscopy  on 9/20/23.     Arrival time: 0930. Procedure time 1030     Pre op exam needed? N/A     Facility location: [facility]; [address] [place] [address], [address]rd Floor, [place] [address]     Sedation type: Conscious sedation     COVID policy reviewed.    Designated  policy reviewed. Instructed to have someone stay 6 hours post procedure.       Medication review:    NSAIDS? Yes.  Ibuprofen (Advil, Motrin).  Holding interval of 1 day before procedure. and Naproxen (Naprosyn, Aleve).  Holding interval of 4 days.        Prep for procedure:     Reviewed procedure prep instructions.     Patient verbalized understanding and had no questions or concerns at this time.        Theresa Cruz RN  Endoscopy Procedure Pre Assessment RN  [phone] option 4        "

## 2023-09-15 NOTE — TELEPHONE ENCOUNTER
"Endoscopy Scheduling Screen    Have you had a positive Covid test in the last 14 days?  No    Are you active on MyChart?   Yes    What insurance is in the chart?  Other:  med[employer]     Ordering/Referring Provider: DONN ALCANTAR     (If ordering provider performs procedure, schedule with ordering provider unless otherwise instructed. )    BMI: Estimated body mass index is 29.92 kg/m  as calculated from the following:    Height as of 9/12/23: 1.74 m (5' 8.5\").    Weight as of 9/12/23: 90.6 kg (199 lb 11.2 oz).     Sedation Ordered  moderate sedation.   If patient BMI > 50 do not schedule in [facility].    If patient BMI > 45 do not schedule at [facility].    Are you taking methadone or Suboxone?  No    Are you taking any prescription medications for pain 3 or more times per week?   No    Do you have a history of malignant hyperthermia or adverse reaction to anesthesia?  No    (Females) Are you currently pregnant?   No     Have you been diagnosed or told you have pulmonary hypertension?   No    Do you have an LVAD?  No    Have you been told you have moderate to severe sleep apnea?  No    Have you been told you have COPD, asthma, or any other lung disease?  No    Do you have any heart conditions?  No     Have you ever had an organ transplant?   No    Have you ever had or are you awaiting a heart or lung transplant?   No    Have you had a stroke or transient ischemic attack (TIA aka \"mini stroke\" in the last 6 months?   No    Have you been diagnosed with or been told you have cirrhosis of the liver?   No    Are you currently on dialysis?   No    Do you need assistance transferring?   No    BMI: Estimated body mass index is 29.92 kg/m  as calculated from the following:    Height as of 9/12/23: 1.74 m (5' 8.5\").    Weight as of 9/12/23: 90.6 kg (199 lb 11.2 oz).     Is patients BMI > 40 and scheduling location [facility]?  No    Do you take an injectable medication for weight loss or diabetes (excluding insulin)?  No    Do you take " the medication Naltrexone?  No    Do you take blood thinners?  No       Prep   Are you currently on dialysis or do you have chronic kidney disease?  No    Do you have a diagnosis of diabetes?  No    Do you have a diagnosis of cystic fibrosis (CF)?  No    On a regular basis do you go 3 -5 days between bowel movements?  Yes (Extended Prep)    BMI > 40?  No    [facility]erred [id number] [place] [facility] [address] [facility]  [address] [facility] [address]  Phone: [phone] Fax: [phone]      Final Scheduling Details   Colonoscopy prep sent?  Golytely Extended Prep    Procedure scheduled  Colonoscopy    Surgeon:  Krystal      Date of procedure:  09/20/2023     Pre-OP / PAC:   No - Not required for this site.    Location  [facility] - Per order.    Sedation   Moderate Sedation - Per order.      Patient Reminders:   You will receive a call from a Nurse to review instructions and health history.  This assessment must be completed prior to your procedure.  Failure to complete the Nurse assessment may result in the procedure being cancelled.      On the day of your procedure, please designate an adult(s) who can drive you home stay with you for the next 24 hours. The medicines used in the exam will make you sleepy. You will not be able to drive.      You cannot take public transportation, ride share services, or non-medical taxi service without a responsible caregiver.  Medical transport services are allowed with the requirement that a responsible caregiver will receive you at your destination.  We require that drivers and caregivers are confirmed prior to your procedure.

## 2023-09-18 NOTE — TELEPHONE ENCOUNTER
Patient is requesting an in person appointment to review scans and colonoscopy results. Staff message sent to Dr. Gonzalez to determine if 9/26 in person is an option.  Brigid Montero RN on 9/18/2023 at 12:52 PM     6

## 2023-09-20 ENCOUNTER — HOSPITAL ENCOUNTER (OUTPATIENT)
Facility: CLINIC | Age: 43
Discharge: HOME | End: 2023-09-20
Attending: INTERNAL MEDICINE | Admitting: INTERNAL MEDICINE
Payer: COMMERCIAL

## 2023-09-20 VITALS
SYSTOLIC BLOOD PRESSURE: 116 MMHG | OXYGEN SATURATION: 97 % | RESPIRATION RATE: 10 BRPM | HEART RATE: 92 BPM | DIASTOLIC BLOOD PRESSURE: 84 MMHG

## 2023-09-20 LAB — COLONOSCOPY: NORMAL

## 2023-09-20 PROCEDURE — G0500 MOD SEDAT ENDO SERVICE >5YRS: HCPCS | Performed by: INTERNAL MEDICINE

## 2023-09-20 PROCEDURE — 250N000011 HC RX IP 250 OP 636: Performed by: INTERNAL MEDICINE

## 2023-09-20 PROCEDURE — 45385 COLONOSCOPY W/LESION REMOVAL: CPT | Performed by: INTERNAL MEDICINE

## 2023-09-20 PROCEDURE — 88305 TISSUE EXAM BY PATHOLOGIST: CPT | Mod: TC | Performed by: INTERNAL MEDICINE

## 2023-09-20 RX ORDER — FENTANYL CITRATE 50 UG/ML
INJECTION, SOLUTION INTRAMUSCULAR; INTRAVENOUS PRN
Status: DISCONTINUED | OUTPATIENT
Start: 2023-09-20 | End: 2023-09-20 | Stop reason: HOSPADM

## 2023-09-20 RX ORDER — DIPHENHYDRAMINE HYDROCHLORIDE 50 MG/ML
INJECTION INTRAMUSCULAR; INTRAVENOUS PRN
Status: DISCONTINUED | OUTPATIENT
Start: 2023-09-20 | End: 2023-09-20 | Stop reason: HOSPADM

## 2023-09-20 ASSESSMENT — ACTIVITIES OF DAILY LIVING (ADL)
ADLS_ACUITY_SCORE: 35
ADLS_ACUITY_SCORE: 35

## 2023-09-20 NOTE — PROGRESS NOTES
Writer confirmed in person appointment with patient at the [facility] on 9/26  Brigid Montero RN on 9/20/2023 at 8:48 AM

## 2023-09-20 NOTE — OR NURSING
Patient had colonoscopy with polypectomy x1 Ptient tolerated procedure under conscious sedation and 2 liters nasal cannula

## 2023-09-21 LAB
PATH REPORT.COMMENTS IMP SPEC: NORMAL
PATH REPORT.COMMENTS IMP SPEC: NORMAL
PATH REPORT.FINAL DX SPEC: NORMAL
PATH REPORT.GROSS SPEC: NORMAL
PATH REPORT.MICROSCOPIC SPEC OTHER STN: NORMAL
PATH REPORT.RELEVANT HX SPEC: NORMAL
PHOTO IMAGE: NORMAL

## 2023-09-21 PROCEDURE — 88305 TISSUE EXAM BY PATHOLOGIST: CPT | Mod: 26 | Performed by: PATHOLOGY

## 2023-09-22 NOTE — RESULT ENCOUNTER NOTE
"Ms. Ndiaye -     I am writing to let you know the results of the small polyp that was removed at the time of your colonoscopy.  The polyp returned as an \"adenomatous polyp\".  An adenoma is a type of benign polyp. If left in place for years, adenomas have a small risk of developing cancer.  There was no cancer in your polyp.  Your polyp was completely removed, but you are at risk to grow more adenomatous polyps in the future.      I would note that this polyp is not related to the pelvic mass. We did not see evidence of malignancy on your colonoscopy.    Based on your findings, current gastroenterology societies recommend a colonoscopy to look for new polyps in 7 years.  I suggest that you discuss this with your primary care provider(s) at that time.    If you have any questions, please don't hesitate to contact the Gastroenterology nurse at [phone].       Wei Rice MD  Professor of Medicine  Division of Gastroenterology, Hepatology, and Nutrition  [facility] "

## 2023-09-26 ENCOUNTER — ONCOLOGY VISIT (OUTPATIENT)
Dept: ONCOLOGY | Facility: CLINIC | Age: 43
End: 2023-09-26
Attending: OBSTETRICS & GYNECOLOGY
Payer: COMMERCIAL

## 2023-09-26 VITALS
WEIGHT: 203.1 LBS | OXYGEN SATURATION: 98 % | HEART RATE: 116 BPM | SYSTOLIC BLOOD PRESSURE: 126 MMHG | BODY MASS INDEX: 30.43 KG/M2 | DIASTOLIC BLOOD PRESSURE: 83 MMHG

## 2023-09-26 DIAGNOSIS — N83.292 COMPLEX CYST OF LEFT OVARY: Primary | ICD-10-CM

## 2023-09-26 DIAGNOSIS — N80.9 ENDOMETRIOSIS: ICD-10-CM

## 2023-09-26 DIAGNOSIS — R19.00 PELVIC MASS: ICD-10-CM

## 2023-09-26 PROCEDURE — G0463 HOSPITAL OUTPT CLINIC VISIT: HCPCS | Performed by: OBSTETRICS & GYNECOLOGY

## 2023-09-26 PROCEDURE — 99214 OFFICE O/P EST MOD 30 MIN: CPT | Mod: 57 | Performed by: OBSTETRICS & GYNECOLOGY

## 2023-09-26 ASSESSMENT — PAIN SCALES - GENERAL: PAINLEVEL: NO PAIN (0)

## 2023-09-26 NOTE — NURSING NOTE
"Oncology Rooming Note    September 26, 2023 9:30 AM   Shayy Ndiaye is a 43 year old female who presents for:    Chief Complaint   Patient presents with    Oncology Clinic Visit     Malignant neoplasm of endocervix     Initial Vitals: /83 (BP Location: Right arm, Patient Position: Sitting, Cuff Size: Adult Large)   Pulse 116   Wt 92.1 kg (203 lb 1.6 oz)   LMP 08/31/2023   SpO2 98%   BMI 30.43 kg/m   Estimated body mass index is 30.43 kg/m  as calculated from the following:    Height as of 9/12/23: 1.74 m (5' 8.5\").    Weight as of this encounter: 92.1 kg (203 lb 1.6 oz). Body surface area is 2.11 meters squared.  No Pain (0) Comment: Data Unavailable   Patient's last menstrual period was 08/31/2023.  Allergies reviewed: Yes  Medications reviewed: Yes    Medications: Medication refills not needed today.  Pharmacy [facility] into [id number] [place] [address] [facility]    Clinical concerns: none      Jahaira Acosta, EMT  9/26/2023              "

## 2023-09-26 NOTE — PROGRESS NOTES
Consult Notes on Referred Patient        Dr. Serina Palacios, APRN CNP  [address] [facility] [address]  [place] [address]       RE: Shayy Ndiaye  : 1980  SILVIANO: Sep 26, 2023    HPI:  Shayy Ndiaye is 43 year old with ovarian mass.  She is unaccompanied today.  No significant change since our last visit.    She has been noticing significant bowel changes and worsened around May.  She has been having difficulty and pain when passing a bowel movement.  She continues to have bowel movements but just notes a change recently.  No blood in the stool.  She has also had an increase in her pelvic pain from her baseline. Increased urination with incontinence at the end of the stream.  Regular menses without significant changes other than more dysmenorrhea.  She has had a decreased appetite but has not had nausea. She had a pelvic sonogram as described below.     She has tried Lupron for 18 months in the past with terrible side effects.  She has also been on OCP for 5-6 years with significant side effects and thus she stopped.  She has also had unintended weight loss of 15 pounds since , patient attributing to poor appetite due to bowel issues.     23:   = 188    23:  US pelvis:  Uterus: anteverted. Contour is irregular w/ myomata: 1 Left Pedunculated 4.3 x 3.3 x 3.0 cm. Size: 7.0 x 4.2 x 3.3 cm Endometrium: Thickness Total 7.4 mm Findings: uterine fibroid sits against lt ovary Right Ovary: 4.1 x 2.9 x 2.4 cm. Complex cyst 1.9 x 2.0 x 1.6 cm Left Ovary: 6.4 x 3.9 x 2.8 cm. Cluster of cysts 5.6 x 2.7 x 5.3 cm Cul de Sac Free Fluid: No free fluid    23:  MRI Pelvis:  UTERUS: Anteverted measuring 7.8 x 3.5 x 4.7 cm. ENDOMETRIUM: 3 mm in thickness. The junctional zone is within normal limits. ADNEXA: A heterogeneous left adnexal mass arising from the left ovary measures 7.1 x 6.2 x 4.4 cm. The mass surrounds the adjacent sigmoid colon. The mass has cystic and solid components. The solid components  demonstrate heterogeneous enhancement. The cystic components contain fluid/fluid levels and T1 hyperintense material which either represents proteinaceous debris or blood. No normal left ovarian tissue visualized. The right ovary is normal measuring 1.8 x 1.7 x 3.0 cm. ADDITIONAL FINDINGS: No lymphadenopathy. Trace free fluid in the pelvis. No osseous lesion.    9/15/23:  CT C/A/P (personally reviewed):  1. Complex left adnexal mass with heterogenous enhancing solid component and associated lobulated multiloculated cystic component, abutting/draping along the the sigmoid colon without intervening fat plane. 2. Indeterminate focal liver lesions, as described including hyperdense lesion in segment 4A and ill marginated hypodense lesion in segment 3; recommend MRI liver with Eovist for further characterization. 3. Few sub-6 mm pulmonary nodules, indeterminate, consider correlation with prior imaging if available and/or attention on follow-up 4. Centimeters sized sclerotic foci in the left iliac bone, consider correlation with prior imaging if available and/or attention on follow-up. 5. Mildly enlarged periportal lymph node. 6. Mild free fluid in the pelvis. No high-grade bowel obstruction.    23:  Colonoscopy:  The colonoscopy was normal aside from a small polyp. We did have the sense that the sigmoid colon was tortuous, possible related to the external mass   Pathology:  tubular adenoma    Obstetrics and Gynecology History:   (1 prior ETOP)  Patient is comfortable with no longer preserving fertility if that is the safest option.      Past Medical History:  Past Medical History:   Diagnosis Date    Anxiety     Endometriosis     Motion sickness        Past Surgical History:  Past Surgical History:   Procedure Laterality Date    COLONOSCOPY N/A 2023    Procedure: COLONOSCOPY, FLEXIBLE, WITH LESION REMOVAL USING SNARE;  Surgeon: Wei Rice MD;  Location:  GI    D & C      elected AB     DILATION AND CURETTAGE, HYSTEROSCOPY DIAGNOSTIC, COMBINED N/A 10/6/2020    Procedure: HYSTEROSCOPY, DILATATION CURETTAGE;  Surgeon: Mino Urias MD;  Location:  OR     TOOTH EXTRACTION W/FORCEP      LAPAROSCOPIC LYSIS ADHESIONS N/A 10/6/2020    Procedure: LYSIS OF ADHESIONS;  Surgeon: Mino Urias MD;  Location:  OR    LAPAROSCOPY DIAGNOSTIC (GYN)  05/2008    endo    LAPAROSCOPY DIAGNOSTIC (GYN)  09/2008    endo    LASER CO2 LAPAROSCOPIC VAPORIZATION ENDOMETRIUM N/A 10/6/2020    Procedure: LAPAROSCOPY WITH CO2 LASER, LASER VAPORIZATION OF ENDOMETRIOSIS;  Surgeon: Mino Urias MD;  Location:  OR    TONSILLECTOMY & ADENOIDECTOMY         Health Maintenance:  Last Pap Smear: 6/15/23              Result: Negative, HPV negative  She has not had a history of abnormal Pap smears.    Last Mammogram: 8/4/23              Result: normal      She has not had a history of abnormal mammograms.    Last Colonoscopy: N/A       Social History:  Lives with her partner, feels safe at home.  Works in IT/, works from home.  Enjoys online video games.  Does not have an advanced directive on file and would like her mother Saima, to be her POA.  Full code.    Family History:   The patient's family history is significant for.  Family History   Problem Relation Age of Onset    Hyperlipidemia Mother     Breast Cancer Mother     Diabetes Father     No Known Problems Sister     No Known Problems Brother     No Known Problems Maternal Grandmother     No Known Problems Maternal Grandfather     Bone Cancer Paternal Grandmother     No Known Problems Paternal Grandfather     Diabetes Paternal Aunt     No Known Problems Other     Breast Cancer Maternal Aunt     Cancer Maternal Uncle         head and neck   Paternal grandmother: bone cancer at 43      Physical Exam:   /83 (BP Location: Right arm, Patient Position: Sitting, Cuff Size: Adult Large)   Pulse 116   Wt 92.1 kg (203 lb 1.6 oz)   LMP 08/31/2023   SpO2 98%    BMI 30.43 kg/m    GENERAL: Healthy, alert and no distress  EYES: Eyes grossly normal to inspection.  No discharge or erythema, or obvious scleral/conjunctival abnormalities.  RESP: No audible wheeze, cough, or visible cyanosis.  No visible retractions or increased work of breathing.    SKIN: Visible skin clear. No significant rash, abnormal pigmentation or lesions.  NEURO: Cranial nerves grossly intact.  Mentation and speech appropriate for age.  PSYCH: Mentation appears normal, affect normal/bright, judgement and insight intact, normal speech and appearance well-groomed.     Labs:  None      Assessment:    Shayy Ndiaye is a 43 year old woman with a diagnosis of ovarian mass with elevated .     A total of 30 minutes was spent on patient care today.       Plan:     1.)    Ovarian mass, elevated -pl reviewed her CT with some findings concerning for metastatic disease including in the liver and bone and we will thus obtain a PET prior to surgery.  We then discussed the rationale for laparoscopy.  If the ovary is freely mobile, plan will be for unilateral oophorectomy with frozen section and full staging (pt does not desire fertility sparing surgery if malignancy is identified) based on those findings.  We discussed the possibility that the ovaries would be adherent to the uterus and that to remove either one, it may require removal of the uterus, cervix and both ovaries and fallopian tubes.  We also discussed her colonoscopy findings that the mass does not seem to be invading into the colon but there is some external pressure and thus a bowel resection or even ostomy formation may be required. She will see PAC prior to her surgery given her complications with anesthesia previously.  Risks, benefits, indications and alternatives were reviewed.  All her questions were answered and she will undergo diagnostic laparoscopy, removal of one ovary and fallopian tube, possible removal of uterus, cervix,  contra-lateral ovary and fallopian tube, possible cancer surgery, possible open procedure, possible bowel resection, possible ostomy on 10/4 at [facility].     2.) Genetic risk factors were assessed and the patient does not meet the qualifications for a referral unless malignancy is identified.      3.) Labs and/or tests ordered include:  PET/CT, CBC, CMP, T&S     4.) Health maintenance issues addressed today include pt is up to date.    5.) Pre-operative teaching was completed today        Thank you for allowing us to participate in the care of your patient.         Sincerely,    Radha Gonzalez MD  Gynecologic Oncology  [facility]       GELACIO BELTRE

## 2023-09-26 NOTE — NURSING NOTE
Pet scan and labs ordered, to be done prior to surgery    Pre Op Nurse Teaching Template    Relevant Diagnosis: cervical cancer    Teaching Topic:     Person(s) involved in teaching :  Patient    Motivation Level:  Asks Questions:    Yes      Eager to Learn:     Yes     Cooperative:          Yes    Receptive (willing. Able to accept information):    Yes      Patient and those who are listed above demonstrates understanding of the following:   Reason for the appointment, diagnosis and treatment plan:   Yes   Knowledge of proper use of medications and conditions for which they are ordered (with special attention to potential side effects or drug interactions): Yes   Which situations necessitate calling provider and whom to contact: Yes         Nutritional needs and diet plan:  Yes      Pain management techniques:     Yes, Pain Scale   Diet:   Yes, [facility] Diet Instructions    Teaching Concerns addressed: Yes    Infection Prevention:  Patient and those who are listed above demonstrate understanding of the following:  Pre-Op CHG Bathing Instructions: Yes  Surgical procedure site care taught:   Yes   Signs and symptoms of infection taught: Yes       Instructional Materials Used/Given:  The [facility] Before You Surgery Booklet  Showering or Bathing before Surgery Instructions & CHG Product  Hysterectomy Guidelines  Pain Assessment Tool   Home Care after Major Abdominal or Vaginal Surgery  Map  Accommodations Brochure  Phone numbers for [facility] and [facility]  Copy of Surgical Consent    Done Today: ,   Preop Visit   not needed  done per Lisa 9/26/23  Tests Ordered: pet scan and labs  Post Op Visit Scheduled:TBD  Comments:    Surgery date/time:TBD        Consent signed via IPAD and on file in media  Hysterectomy consent signed and sent to scanning  .

## 2023-09-26 NOTE — LETTER
2023         RE: Shayy Ndiaye  [id number] [facility] [id number]        Dear Colleague,    Thank you for referring your patient, Shayy Ndiaye, to the [facility]. Please see a copy of my visit note below.    Consult Notes on Referred Patient        Dr. Serina Palacios, APRN CNP  [address]  [place] [address]       RE: Shayy Ndiaye  : 1980  SILVIANO: Sep 26, 2023    HPI:  Shayy Ndiaye is 43 year old with ovarian mass.  She is unaccompanied today.  No significant change since our last visit.    She has been noticing significant bowel changes and worsened around May.  She has been having difficulty and pain when passing a bowel movement.  She continues to have bowel movements but just notes a change recently.  No blood in the stool.  She has also had an increase in her pelvic pain from her baseline. Increased urination with incontinence at the end of the stream.  Regular menses without significant changes other than more dysmenorrhea.  She has had a decreased appetite but has not had nausea. She had a pelvic sonogram as described below.     She has tried Lupron for 18 months in the past with terrible side effects.  She has also been on OCP for 5-6 years with significant side effects and thus she stopped.  She has also had unintended weight loss of 15 pounds since , patient attributing to poor appetite due to bowel issues.     23:   = 188    23:  US pelvis:  Uterus: anteverted. Contour is irregular w/ myomata: 1 Left Pedunculated 4.3 x 3.3 x 3.0 cm. Size: 7.0 x 4.2 x 3.3 cm Endometrium: Thickness Total 7.4 mm Findings: uterine fibroid sits against lt ovary Right Ovary: 4.1 x 2.9 x 2.4 cm. Complex cyst 1.9 x 2.0 x 1.6 cm Left Ovary: 6.4 x 3.9 x 2.8 cm. Cluster of cysts 5.6 x 2.7 x 5.3 cm Cul de Sac Free Fluid: No free fluid    23:  MRI Pelvis:  UTERUS: Anteverted measuring 7.8 x 3.5 x 4.7 cm. ENDOMETRIUM: 3 mm in thickness.  The junctional zone is within normal limits. ADNEXA: A heterogeneous left adnexal mass arising from the left ovary measures 7.1 x 6.2 x 4.4 cm. The mass surrounds the adjacent sigmoid colon. The mass has cystic and solid components. The solid components demonstrate heterogeneous enhancement. The cystic components contain fluid/fluid levels and T1 hyperintense material which either represents proteinaceous debris or blood. No normal left ovarian tissue visualized. The right ovary is normal measuring 1.8 x 1.7 x 3.0 cm. ADDITIONAL FINDINGS: No lymphadenopathy. Trace free fluid in the pelvis. No osseous lesion.    9/15/23:  CT C/A/P (personally reviewed):  1. Complex left adnexal mass with heterogenous enhancing solid component and associated lobulated multiloculated cystic component, abutting/draping along the the sigmoid colon without intervening fat plane. 2. Indeterminate focal liver lesions, as described including hyperdense lesion in segment 4A and ill marginated hypodense lesion in segment 3; recommend MRI liver with Eovist for further characterization. 3. Few sub-6 mm pulmonary nodules, indeterminate, consider correlation with prior imaging if available and/or attention on follow-up 4. Centimeters sized sclerotic foci in the left iliac bone, consider correlation with prior imaging if available and/or attention on follow-up. 5. Mildly enlarged periportal lymph node. 6. Mild free fluid in the pelvis. No high-grade bowel obstruction.    23:  Colonoscopy:  The colonoscopy was normal aside from a small polyp. We did have the sense that the sigmoid colon was tortuous, possible related to the external mass   Pathology:  tubular adenoma    Obstetrics and Gynecology History:   (1 prior ETOP)  Patient is comfortable with no longer preserving fertility if that is the safest option.      Past Medical History:  Past Medical History:   Diagnosis Date    Anxiety     Endometriosis     Motion sickness        Past  Surgical History:  Past Surgical History:   Procedure Laterality Date    COLONOSCOPY N/A 9/20/2023    Procedure: COLONOSCOPY, FLEXIBLE, WITH LESION REMOVAL USING SNARE;  Surgeon: Wei Rice MD;  Location:  GI    D & C  1999    elected AB    DILATION AND CURETTAGE, HYSTEROSCOPY DIAGNOSTIC, COMBINED N/A 10/6/2020    Procedure: HYSTEROSCOPY, DILATATION CURETTAGE;  Surgeon: Mino Urias MD;  Location:  OR    HC TOOTH EXTRACTION W/FORCEP      LAPAROSCOPIC LYSIS ADHESIONS N/A 10/6/2020    Procedure: LYSIS OF ADHESIONS;  Surgeon: Mino Urias MD;  Location:  OR    LAPAROSCOPY DIAGNOSTIC (GYN)  05/2008    endo    LAPAROSCOPY DIAGNOSTIC (GYN)  09/2008    endo    LASER CO2 LAPAROSCOPIC VAPORIZATION ENDOMETRIUM N/A 10/6/2020    Procedure: LAPAROSCOPY WITH CO2 LASER, LASER VAPORIZATION OF ENDOMETRIOSIS;  Surgeon: Mino Urias MD;  Location:  OR    TONSILLECTOMY & ADENOIDECTOMY         Health Maintenance:  Last Pap Smear: 6/15/23              Result: Negative, HPV negative  She has not had a history of abnormal Pap smears.    Last Mammogram: 8/4/23              Result: normal      She has not had a history of abnormal mammograms.    Last Colonoscopy: N/A       Social History:  Lives with her partner, feels safe at home.  Works in IT/, works from home.  Enjoys online video games.  Does not have an advanced directive on file and would like her mother Saima, to be her POA.  Full code.    Family History:   The patient's family history is significant for.  Family History   Problem Relation Age of Onset    Hyperlipidemia Mother     Breast Cancer Mother     Diabetes Father     No Known Problems Sister     No Known Problems Brother     No Known Problems Maternal Grandmother     No Known Problems Maternal Grandfather     Bone Cancer Paternal Grandmother     No Known Problems Paternal Grandfather     Diabetes Paternal Aunt     No Known Problems Other     Breast Cancer Maternal Aunt     Cancer  Maternal Uncle         head and neck   Paternal grandmother: bone cancer at 43      Physical Exam:   /83 (BP Location: Right arm, Patient Position: Sitting, Cuff Size: Adult Large)   Pulse 116   Wt 92.1 kg (203 lb 1.6 oz)   LMP 08/31/2023   SpO2 98%   BMI 30.43 kg/m    GENERAL: Healthy, alert and no distress  EYES: Eyes grossly normal to inspection.  No discharge or erythema, or obvious scleral/conjunctival abnormalities.  RESP: No audible wheeze, cough, or visible cyanosis.  No visible retractions or increased work of breathing.    SKIN: Visible skin clear. No significant rash, abnormal pigmentation or lesions.  NEURO: Cranial nerves grossly intact.  Mentation and speech appropriate for age.  PSYCH: Mentation appears normal, affect normal/bright, judgement and insight intact, normal speech and appearance well-groomed.     Labs:  None      Assessment:    Shayy Ndiaye is a 43 year old woman with a diagnosis of ovarian mass with elevated .     A total of 30 minutes was spent on patient care today.       Plan:     1.)    Ovarian mass, elevated -ke reviewed her CT with some findings concerning for metastatic disease including in the liver and bone and we will thus obtain a PET prior to surgery.  We then discussed the rationale for laparoscopy.  If the ovary is freely mobile, plan will be for unilateral oophorectomy with frozen section and full staging (pt does not desire fertility sparing surgery if malignancy is identified) based on those findings.  We discussed the possibility that the ovaries would be adherent to the uterus and that to remove either one, it may require removal of the uterus, cervix and both ovaries and fallopian tubes.  We also discussed her colonoscopy findings that the mass does not seem to be invading into the colon but there is some external pressure and thus a bowel resection or even ostomy formation may be required. She will see PAC prior to her surgery given her  complications with anesthesia previously.  Risks, benefits, indications and alternatives were reviewed.  All her questions were answered and she will undergo diagnostic laparoscopy, removal of one ovary and fallopian tube, possible removal of uterus, cervix, contra-lateral ovary and fallopian tube, possible cancer surgery, possible open procedure, possible bowel resection, possible ostomy on 10/4 at [facility].     2.) Genetic risk factors were assessed and the patient does not meet the qualifications for a referral unless malignancy is identified.      3.) Labs and/or tests ordered include:  PET/CT, CBC, CMP, T&S     4.) Health maintenance issues addressed today include pt is up to date.    5.) Pre-operative teaching was completed today        Thank you for allowing us to participate in the care of your patient.         Sincerely,    Radha Gonzalez MD  Gynecologic Oncology  [facility]       GELACIO BELTRE

## 2023-09-27 ENCOUNTER — TELEPHONE (OUTPATIENT)
Dept: ONCOLOGY | Facility: CLINIC | Age: 43
End: 2023-09-27
Payer: COMMERCIAL

## 2023-09-27 ENCOUNTER — PATIENT OUTREACH (OUTPATIENT)
Dept: ONCOLOGY | Facility: CLINIC | Age: 43
End: 2023-09-27
Payer: COMMERCIAL

## 2023-09-27 LAB
ABO/RH(D): NORMAL
ANTIBODY SCREEN: NEGATIVE
SPECIMEN EXPIRATION DATE: NORMAL

## 2023-09-27 RX ORDER — CEFAZOLIN SODIUM 2 G/50ML
2 SOLUTION INTRAVENOUS SEE ADMIN INSTRUCTIONS
Status: CANCELLED | OUTPATIENT
Start: 2023-09-27

## 2023-09-27 RX ORDER — PHENAZOPYRIDINE HYDROCHLORIDE 200 MG/1
200 TABLET, FILM COATED ORAL ONCE
Status: CANCELLED | OUTPATIENT
Start: 2023-09-27 | End: 2023-09-27

## 2023-09-27 RX ORDER — CEFAZOLIN SODIUM 2 G/50ML
2 SOLUTION INTRAVENOUS
Status: CANCELLED | OUTPATIENT
Start: 2023-09-27

## 2023-09-27 RX ORDER — METRONIDAZOLE 500 MG/100ML
500 INJECTION, SOLUTION INTRAVENOUS
Status: CANCELLED | OUTPATIENT
Start: 2023-09-27

## 2023-09-27 RX ORDER — HEPARIN SODIUM 5000 [USP'U]/.5ML
5000 INJECTION, SOLUTION INTRAVENOUS; SUBCUTANEOUS
Status: CANCELLED | OUTPATIENT
Start: 2023-09-27

## 2023-09-27 NOTE — TELEPHONE ENCOUNTER
Patient is schedule for surgery with: Dr. Gonzalez     Surgery Date: 10/4/23     Location: [facility]    H&P: already completed by surgeon on 9/26     Post-op: will be scheduled by the clinic    Patient will receive a phone call from pre-admission nurses 1-2 days prior to surgery with arrival and start time.    Patient aware times are subject to change up until day before surgery.     Patient questions/concerns: N/A     Surgery packet was sent via [employer] on 9/27/23      Ana M Delgado on 9/27/2023 at 8:14 AM

## 2023-09-27 NOTE — TELEPHONE ENCOUNTER
Per provider, patient has requested a PAC appt. Writer called pt and scheduled it for 9/28, virtual.   Ana M Delgado on 9/27/2023 at 3:58 PM

## 2023-09-27 NOTE — TELEPHONE ENCOUNTER
Lisa Alcocer, MD Shanta Flor Kim A, RN  Cc: Dipika Guevara RN  Caller: Unspecified (Today,  3:01 PM)  She does not need to stop CBD prior to PET    Ok to take mag and Vit D for surgery    Writer attempted to contact patient to review above information but no answer.    Writer will request that COLE AlvarezCC follow up with patient tomorrow.    Drea Womack RN, BSN, OCN on 9/27/2023 at 4:16 PM

## 2023-09-28 ENCOUNTER — PRE VISIT (OUTPATIENT)
Dept: SURGERY | Facility: CLINIC | Age: 43
End: 2023-09-28

## 2023-09-28 ENCOUNTER — LAB (OUTPATIENT)
Dept: LAB | Facility: CLINIC | Age: 43
End: 2023-09-28
Payer: COMMERCIAL

## 2023-09-28 ENCOUNTER — MYC MEDICAL ADVICE (OUTPATIENT)
Dept: ONCOLOGY | Facility: CLINIC | Age: 43
End: 2023-09-28

## 2023-09-28 ENCOUNTER — ANESTHESIA EVENT (OUTPATIENT)
Dept: SURGERY | Facility: CLINIC | Age: 43
DRG: 742 | End: 2023-09-28
Payer: COMMERCIAL

## 2023-09-28 DIAGNOSIS — N83.292 COMPLEX CYST OF LEFT OVARY: ICD-10-CM

## 2023-09-28 DIAGNOSIS — N80.9 ENDOMETRIOSIS: ICD-10-CM

## 2023-09-28 LAB
ALBUMIN SERPL BCG-MCNC: 4.5 G/DL (ref 3.5–5.2)
ALP SERPL-CCNC: 97 U/L (ref 35–104)
ALT SERPL W P-5'-P-CCNC: 18 U/L (ref 0–50)
ANION GAP SERPL CALCULATED.3IONS-SCNC: 12 MMOL/L (ref 7–15)
AST SERPL W P-5'-P-CCNC: 21 U/L (ref 0–45)
BASOPHILS # BLD AUTO: 0.1 10E3/UL (ref 0–0.2)
BASOPHILS NFR BLD AUTO: 1 %
BILIRUB SERPL-MCNC: <0.2 MG/DL
BUN SERPL-MCNC: 9 MG/DL (ref 6–20)
CALCIUM SERPL-MCNC: 9.6 MG/DL (ref 8.6–10)
CHLORIDE SERPL-SCNC: 105 MMOL/L (ref 98–107)
CREAT SERPL-MCNC: 0.72 MG/DL (ref 0.51–0.95)
EGFRCR SERPLBLD CKD-EPI 2021: >90 ML/MIN/1.73M2
EOSINOPHIL # BLD AUTO: 0.1 10E3/UL (ref 0–0.7)
EOSINOPHIL NFR BLD AUTO: 1 %
ERYTHROCYTE [DISTWIDTH] IN BLOOD BY AUTOMATED COUNT: 12.4 % (ref 10–15)
GLUCOSE SERPL-MCNC: 102 MG/DL (ref 70–99)
HCO3 SERPL-SCNC: 24 MMOL/L (ref 22–29)
HCT VFR BLD AUTO: 37 % (ref 35–47)
HGB BLD-MCNC: 12 G/DL (ref 11.7–15.7)
IMM GRANULOCYTES # BLD: 0 10E3/UL
IMM GRANULOCYTES NFR BLD: 0 %
LYMPHOCYTES # BLD AUTO: 2.5 10E3/UL (ref 0.8–5.3)
LYMPHOCYTES NFR BLD AUTO: 34 %
MCH RBC QN AUTO: 29.1 PG (ref 26.5–33)
MCHC RBC AUTO-ENTMCNC: 32.4 G/DL (ref 31.5–36.5)
MCV RBC AUTO: 90 FL (ref 78–100)
MONOCYTES # BLD AUTO: 0.4 10E3/UL (ref 0–1.3)
MONOCYTES NFR BLD AUTO: 5 %
NEUTROPHILS # BLD AUTO: 4.4 10E3/UL (ref 1.6–8.3)
NEUTROPHILS NFR BLD AUTO: 59 %
NRBC # BLD AUTO: 0 10E3/UL
NRBC BLD AUTO-RTO: 0 /100
PLATELET # BLD AUTO: 356 10E3/UL (ref 150–450)
POTASSIUM SERPL-SCNC: 3.7 MMOL/L (ref 3.4–5.3)
PROT SERPL-MCNC: 7.5 G/DL (ref 6.4–8.3)
RBC # BLD AUTO: 4.13 10E6/UL (ref 3.8–5.2)
SODIUM SERPL-SCNC: 141 MMOL/L (ref 135–145)
WBC # BLD AUTO: 7.5 10E3/UL (ref 4–11)

## 2023-09-28 PROCEDURE — 86900 BLOOD TYPING SEROLOGIC ABO: CPT

## 2023-09-28 PROCEDURE — 86901 BLOOD TYPING SEROLOGIC RH(D): CPT

## 2023-09-28 PROCEDURE — 85025 COMPLETE CBC W/AUTO DIFF WBC: CPT

## 2023-09-28 PROCEDURE — 80053 COMPREHEN METABOLIC PANEL: CPT

## 2023-09-28 PROCEDURE — 36415 COLL VENOUS BLD VENIPUNCTURE: CPT

## 2023-09-28 PROCEDURE — 86850 RBC ANTIBODY SCREEN: CPT

## 2023-09-28 NOTE — TELEPHONE ENCOUNTER
FUTURE VISIT INFORMATION      SURGERY INFORMATION:  Date: 10/4/23  Location:  or  Surgeon:  Radha Perez MD   Anesthesia Type:  general  Procedure: Diagnostic laparoscopy, removal of one ovary and fallopian tube, possible removal of uterus, cervix, contra-lateral ovary and fallopian tube, possible cancer surgery, possible open procedure, possible bowel resection, possible ostomy   Consult: ov 9/26/23    RECORDS REQUESTED FROM:       Primary Care Provider: [facility]

## 2023-09-28 NOTE — TELEPHONE ENCOUNTER
FUTURE VISIT INFORMATION        SURGERY INFORMATION:  Date: 10/4/23  Location:  or  Surgeon:  Radha Perez MD   Anesthesia Type:  general  Procedure: Diagnostic laparoscopy, removal of one ovary and fallopian tube, possible removal of uterus, cervix, contra-lateral ovary and fallopian tube, possible cancer surgery, possible open procedure, possible bowel resection, possible ostomy   Consult: ov 9/26/23     RECORDS REQUESTED FROM:         Primary Care Provider: [facility]

## 2023-09-29 NOTE — TELEPHONE ENCOUNTER
Late entry from 9/28/23.  Patient called and updated with instructions per Dr Gonzalez regarding medications.  Patient had another question regarding taking CBD prior to surgery. Informed patient that it is most likely okay to take the week leading up to surgery  but not the morning of surgery.    Will notify Dr Gonzalez to clarify and patient aware.    Dipika Guevara RN, BSN, OCN

## 2023-09-29 NOTE — PROGRESS NOTES
Radha Perez MD  You3 hours ago (8:14 AM)     CR  She should hold it the morning of surgery   Patient called and updated as noted. No further questions at this time.    Dipika Guevara RN, BSN, OCN

## 2023-09-29 NOTE — TELEPHONE ENCOUNTER
Late entry from 9/28/24: Discussed FMLA and Disability forms with patient and received per my chart message. Leave of absence will start on 10/4/23 on day of surgery. Will plan for patient to be off anywhere from 4-8 weeks off pending surgical findings and recovery. Patient aware of plan and Dr Gonzalez will sign forms on 10/3 when back in the clinic.    Dipika Guevara RN, BSN, OCN

## 2023-10-02 ENCOUNTER — PATIENT OUTREACH (OUTPATIENT)
Dept: ONCOLOGY | Facility: CLINIC | Age: 43
End: 2023-10-02
Payer: COMMERCIAL

## 2023-10-02 NOTE — TELEPHONE ENCOUNTER
FMLA and Disability forms completed.  Leave of absence will start on 10/4/23 on day of surgery. Will plan for patient to be off anywhere from 4-8 weeks off pending surgical findings and recovery. Patient aware of plan and Dr Gonzalez will sign forms on 10/3 when back in the clinic. Forms will need to be faxed after MD signs.    Juliana Maddox, RN, BSN    RN Care Coordinator  [facility]  [phone]

## 2023-10-02 NOTE — PROGRESS NOTES
Pt called in this afternoon because she went to have her PET scan done today, but the machine malfunctioned and they weren't able to do it.  Shayy said that she is working with the PET 's to get it done tomorrow morning.  She doesn't have a time yet for the appointment, but will call us to update once she knows.  Pt said she is also going to have to move her PAC apt to the afternoon because of needing to do the PET in the morning.  She is planning to call PAC to re-schedule.      While on the phone with patient, she said that she feels she still has questions regarding her surgery on 10/4/23.  She would very much like to speak to Dr. Gonzalez tomorrow 10/3//23 so she feels more comfortable going into her surgery.  She said it is unclear to her exactly what she is having done?  And what the likelihood of cancer is?  She said she was also wondering about the potential of bowel resection/ostomy?  She said she knows some of it depends on the PET scan results, but she'd really like to speak to Dr. Gonzalez tomorrow after her PET results are back to get a more clear picture of the surgery she needs to have done.    Writer will route message to Dr. Gonzalez and her RNCC, Kim, to follow up with patient tomorrow after PET results are back.    Juliana Maddox, RN, BSN    RN Care Coordinator  [facility]  [phone]

## 2023-10-02 NOTE — PROGRESS NOTES
Lisa Alcocer, Radha Maguire MD  You; Dipika Guevara, RN24 minutes ago (1:24 PM)     CR  Yes, my plan was to call her after the PET to discuss the specifics so I will have to wait until the results are back but I will then call her.     Called patient to update her that Dr. Gonzalez will be calling her tomorrow to talk about the specifics of the surgery.  Pt said that her PET is now scheduled for around 11am, with results hopefully being available by 2:30/3pm.  Pt will go to PAC first in the morning, then PET scan.  Writer will route message to Dr. Gonzalez and Kim to update them on appointment info and when patient's PET results may be available.  Pt happy to speak to Dr. Gonzalez after PET results are back.  No further questions or concerns at this time.    Juliana Maddox, RN, BSN    RN Care Coordinator  [facility]  [phone]

## 2023-10-03 ENCOUNTER — ANCILLARY PROCEDURE (OUTPATIENT)
Dept: PET IMAGING | Facility: CLINIC | Age: 43
End: 2023-10-03
Attending: OBSTETRICS & GYNECOLOGY
Payer: COMMERCIAL

## 2023-10-03 ENCOUNTER — TELEPHONE (OUTPATIENT)
Dept: ONCOLOGY | Facility: CLINIC | Age: 43
End: 2023-10-03

## 2023-10-03 ENCOUNTER — OFFICE VISIT (OUTPATIENT)
Dept: SURGERY | Facility: CLINIC | Age: 43
End: 2023-10-03
Payer: COMMERCIAL

## 2023-10-03 ENCOUNTER — PRE VISIT (OUTPATIENT)
Dept: SURGERY | Facility: CLINIC | Age: 43
End: 2023-10-03

## 2023-10-03 VITALS
OXYGEN SATURATION: 95 % | HEART RATE: 96 BPM | BODY MASS INDEX: 30.42 KG/M2 | HEIGHT: 69 IN | WEIGHT: 205.4 LBS | RESPIRATION RATE: 16 BRPM | DIASTOLIC BLOOD PRESSURE: 90 MMHG | SYSTOLIC BLOOD PRESSURE: 138 MMHG

## 2023-10-03 DIAGNOSIS — Z01.818 PRE-OP EVALUATION: Primary | ICD-10-CM

## 2023-10-03 DIAGNOSIS — N83.292 COMPLEX CYST OF LEFT OVARY: ICD-10-CM

## 2023-10-03 LAB — GLUCOSE SERPL-MCNC: 93 MG/DL (ref 70–99)

## 2023-10-03 PROCEDURE — 99203 OFFICE O/P NEW LOW 30 MIN: CPT | Performed by: PHYSICIAN ASSISTANT

## 2023-10-03 RX ORDER — FUROSEMIDE 10 MG/ML
40 INJECTION INTRAMUSCULAR; INTRAVENOUS ONCE
Status: COMPLETED | OUTPATIENT
Start: 2023-10-03 | End: 2023-10-03

## 2023-10-03 RX ORDER — ACETAMINOPHEN 500 MG
500-1000 TABLET ORAL EVERY 6 HOURS PRN
Status: ON HOLD | COMMUNITY
End: 2023-10-06

## 2023-10-03 RX ADMIN — FUROSEMIDE 40 MG: 10 INJECTION INTRAMUSCULAR; INTRAVENOUS at 12:42

## 2023-10-03 ASSESSMENT — COPD QUESTIONNAIRES: COPD: 0

## 2023-10-03 ASSESSMENT — PAIN SCALES - GENERAL: PAINLEVEL: SEVERE PAIN (7)

## 2023-10-03 ASSESSMENT — LIFESTYLE VARIABLES: TOBACCO_USE: 0

## 2023-10-03 ASSESSMENT — PATIENT HEALTH QUESTIONNAIRE - PHQ9: SUM OF ALL RESPONSES TO PHQ QUESTIONS 1-9: 21

## 2023-10-03 ASSESSMENT — ENCOUNTER SYMPTOMS: SEIZURES: 0

## 2023-10-03 NOTE — TELEPHONE ENCOUNTER
Hilary from [facility] pre-op is calling.  Pt is scheduled for surgery tomorrow.  Hilary is reviewing the pre-op exam from 9/26/2023 with Dr Gonzalez.  There is no mention of cardiovascular exam being done at that appt.    Hilary is wondering if this is just missing from the note, or if Dr Gonzalez will just be able to update the cardiovascular exam tomorrow before surgery?    Hilary is requesting return call at [phone].    Will route to Dr Gonzalez for advice.    Dorothy Finnegan RN on 10/3/2023 at 9:18 AM

## 2023-10-03 NOTE — TELEPHONE ENCOUNTER
Writer called Hilary back with preop and informed her that the patient had a PAC visit today and is cleared for surgery. Hilary is aware and no further questions at this time.    Corrie De La Cruz Rn is also updated as well.    Dipika Guevara RN, BSN, OCN

## 2023-10-03 NOTE — H&P
Pre-Operative H & P     CC:  Preoperative exam to assess for increased cardiopulmonary risk while undergoing surgery and anesthesia.    Date of Encounter: 10/3/2023  Primary Care Physician:  Serina Palacios     Reason for visit:   Encounter Diagnosis   Name Primary?    Pre-op evaluation Yes       HPI  Shayy Ndiaye is a 43 year old female who presents for pre-operative H & P in preparation for  Procedure Information       Case: [id number] Date/Time: 10/04/23 1005    Procedures:       Diagnostic laparoscopy, removal of one ovary and fallopian tube, possible removal of uterus, cervix, contra-lateral ovary and fallopian tube, possible cancer surgery, possible open procedure, possible bowel resection, possible ostomy (Bilateral: Abdomen)      Colectomy (Abdomen)    Anesthesia type: General    Diagnosis: Pelvic mass [R19.00]    Pre-op diagnosis: Pelvic mass [R19.00]    Location:  OR [facility] /  OR    Providers: Radha Perez MD            Patient is being evaluated for comorbid conditions of anxiety, endometriosis     Ms. Ndiaye has a known ovarian mass. She ws seen by Dr. Lisa Alcocer for further evaluation. She is scheduled for the above procedure.     History is obtained from the patient and chart review    Hx of abnormal bleeding or anti-platelet use: denies    Menstrual history: Patient's last menstrual period was 09/23/2023 (exact date).     Past Medical History  Past Medical History:   Diagnosis Date    Anxiety     Endometriosis     Motion sickness        Past Surgical History  Past Surgical History:   Procedure Laterality Date    COLONOSCOPY N/A 9/20/2023    Procedure: COLONOSCOPY, FLEXIBLE, WITH LESION REMOVAL USING SNARE;  Surgeon: Wei Rice MD;  Location:  GI    D & C  1999    elected AB    DILATION AND CURETTAGE, HYSTEROSCOPY DIAGNOSTIC, COMBINED N/A 10/6/2020    Procedure: HYSTEROSCOPY, DILATATION CURETTAGE;  Surgeon: Mino Urias MD;  Location:  OR     TOOTH EXTRACTION  W/FORCEP      LAPAROSCOPIC LYSIS ADHESIONS N/A 10/6/2020    Procedure: LYSIS OF ADHESIONS;  Surgeon: Mino Urias MD;  Location: [facility] OR    LAPAROSCOPY DIAGNOSTIC (GYN)  05/2008    endo    LAPAROSCOPY DIAGNOSTIC (GYN)  09/2008    endo    LASER CO2 LAPAROSCOPIC VAPORIZATION ENDOMETRIUM N/A 10/6/2020    Procedure: LAPAROSCOPY WITH CO2 LASER, LASER VAPORIZATION OF ENDOMETRIOSIS;  Surgeon: Mino Urias MD;  Location:  OR    TONSILLECTOMY & ADENOIDECTOMY         Prior to Admission Medications  Current Outpatient Medications   Medication Sig Dispense Refill    acetaminophen (TYLENOL) 500 MG tablet Take 500-1,000 mg by mouth every 6 hours as needed for mild pain      cannabidiol (EPIDIOLEX) 100 MG/ML oral solution Take by mouth 2 times daily (Patient not taking: Reported on 10/3/2023)      magnesium 250 MG tablet  (Patient not taking: Reported on 10/3/2023)      Naproxen Sodium (ALEVE PO)  (Patient not taking: Reported on 10/3/2023)      Vitamin D, Cholecalciferol, 10 MCG (400 UNIT) TABS  (Patient not taking: Reported on 10/3/2023)         Allergies  Allergies   Allergen Reactions    Dust Mite Extract      STUFFY NOSE    Erythromycin     Minocycline Unknown and Nausea       Social History  Social History     Socioeconomic History    Marital status: Single     Spouse name: Not on file    Number of children: Not on file    Years of education: Not on file    Highest education level: Not on file   Occupational History    Not on file   Tobacco Use    Smoking status: Never    Smokeless tobacco: Never   Substance and Sexual Activity    Alcohol use: Not Currently     Comment: Occassional alcohol abuse but now sober    Drug use: No    Sexual activity: Not Currently     Partners: Male     Birth control/protection: None   Other Topics Concern    Parent/sibling w/ CABG, MI or angioplasty before 65F 55M? Not Asked   Social History Narrative    Not on file     Social Determinants of Health     Financial Resource Strain: Not on  file   Food Insecurity: Not on file   Transportation Needs: Not on file   Physical Activity: Not on file   Stress: Not on file   Social Connections: Not on file   Interpersonal Safety: Not At Risk (9/12/2023)    Humiliation, Afraid, Rape, and Kick questionnaire     Fear of Current or Ex-Partner: No     Emotionally Abused: No     Physically Abused: No     Sexually Abused: No   Housing Stability: Not on file       Family History  Family History   Problem Relation Age of Onset    Hyperlipidemia Mother     Breast Cancer Mother     Diabetes Father     No Known Problems Sister     No Known Problems Brother     No Known Problems Maternal Grandmother     No Known Problems Maternal Grandfather     Bone Cancer Paternal Grandmother     No Known Problems Paternal Grandfather     Breast Cancer Maternal Aunt     Cancer Maternal Uncle         head and neck    Diabetes Paternal Aunt     No Known Problems Other     Deep Vein Thrombosis (DVT) No family hx of        Review of Systems  The complete review of systems is negative other than noted in the HPI or here.   Anesthesia Evaluation   Pt has had prior anesthetic.     History of anesthetic complications  - PONV.      ROS/MED HX  ENT/Pulmonary: Comment: PND    (+)     DINORA risk factors, snores loudly,                              (-) tobacco use, asthma and COPD   Neurologic:  - neg neurologic ROS  (-) no seizures and no CVA   Cardiovascular: Comment: H/o palpitations, none recent     (+)  - -   -  - -                                 No previous cardiac testing  (-) taking anticoagulants/antiplatelets   METS/Exercise Tolerance: >4 METS Comment: Walking a mile at a time without exertional symptoms.    Hematologic:  - neg hematologic  ROS  (-) history of blood transfusion   Musculoskeletal:  - neg musculoskeletal ROS     GI/Hepatic:  - neg GI/hepatic ROS  (-) GERD   Renal/Genitourinary: Comment: Endometriosis  Ovarian mass       Endo: Comment: Prediabetes, A1c 5.9   (-) chronic steroid  "usage   Psychiatric/Substance Use:     (+) psychiatric history anxiety       Infectious Disease:  - neg infectious disease ROS     Malignancy:       Other:            BP (!) 138/90 (BP Location: Right arm, Patient Position: Sitting, Cuff Size: Adult Regular)   Pulse 96   Resp 16   Ht 1.74 m (5' 8.5\")   Wt 93.2 kg (205 lb 6.4 oz)   LMP 09/23/2023 (Exact Date)   SpO2 95%   Breastfeeding No   BMI 30.78 kg/m      Physical Exam   Constitutional: Awake, alert, cooperative, no apparent distress, and appears stated age. Tearful at times.   Eyes: Pupils equal, round and reactive to light, extra ocular muscles intact, sclera clear, conjunctiva normal.  HENT: Normocephalic, oral pharynx with moist mucus membranes, good dentition. Lower retainer present.   Respiratory: Clear to auscultation bilaterally, no crackles or wheezing.  Cardiovascular: Regular rate and rhythm, normal S1 and S2, and no murmur noted.  Carotids no bruits. No edema. Palpable pulses to radial arteries.   GI: Normal bowel sounds, soft, non-distended, LLQ tender  Genitourinary:  deferred  Skin: Warm and dry.    Musculoskeletal: Full ROM of neck. There is no redness, warmth, or swelling of the exposed joints. Gross motor strength is normal.    Neurologic: Awake, alert, oriented to name, place and time. Cranial nerves II-XII are grossly intact. Gait is normal.   Neuropsychiatric: Calm, cooperative. Normal affect.     Prior Labs/Diagnostic Studies   All labs and imaging personally reviewed     EKG/ stress test - if available please see in ROS above   No results found.       No data to display                  The patient's records and results personally reviewed by this provider.     Outside records reviewed from: Care Everywhere    LAB/DIAGNOSTIC STUDIES TODAY: none    Assessment    Shayy Ndiaye is a 43 year old female seen as a PAC referral for risk assessment and optimization for anesthesia.    Plan/Recommendations  Pt will be optimized for the " "proposed procedure.  See below for details on the assessment, risk, and preoperative recommendations    NEUROLOGY  - No history of TIA, CVA or seizure  -Post Op delirium risk factors:  No risk identified    ENT  - No current airway concerns.  Will need to be reassessed day of surgery.  Mallampati: I  TM: > 3    CARDIAC  - No history of CAD, Hypertension, and Afib  -denies cardiac symptoms  -she reports historically intermittent palpitations, but none recently   - METS (Metabolic Equivalents)  Patient performs 4 or more METS exercise without symptoms            Total Score: 0      RCRI-Low risk: Class 2 0.9% complication rate            Total Score: 1    RCRI: High Risk Surgery        PULMONARY  DINORA Low Risk            Total Score: 0      - Denies asthma or inhaler use  - Tobacco History    History   Smoking Status    Never   Smokeless Tobacco    Never       GI  PONV High Risk  Total Score: 4           1 AN PONV: Pt is Female    1 AN PONV: Patient is not a current smoker    1 AN PONV: Patient has history of PONV    1 AN PONV: Intended Post Op Opioids      Reports scopolamine was helpful with last procedure. She is aware prophylaxis plan will be determined by anesthesia DOS     /RENAL  - Baseline Creatinine WNL  -endometriosis  -ovarian mass with the above procedure planned     ENDOCRINE    - BMI: Estimated body mass index is 30.78 kg/m  as calculated from the following:    Height as of this encounter: 1.74 m (5' 8.5\").    Weight as of this encounter: 93.2 kg (205 lb 6.4 oz).  Obesity (BMI >30)  - No history of Diabetes Mellitus    HEME  VTE Low Risk 0.26%            Total Score: 0      - No history of abnormal bleeding or antiplatelet use.  -T&S completed 9/28    PSYCH  Patient anxious about her health. She was somewhat tearful during the visit. She has people to talk to. She is interested in a behavioral health referral- I will place that today     Different anesthesia methods/types have been discussed with the " patient, but they are aware that the final plan will be decided by the assigned anesthesia provider on the date of service.    The patient is optimized for their procedure. AVS with information on surgery time/arrival time, meds and NPO status given by nursing staff. No further diagnostic testing indicated.      On the day of service:     Prep time: 7 minutes  Visit time: 20 minutes  Documentation time: 5 minutes  ------------------------------------------  Total time: 32 minutes      Barbara Almaraz PA-C  Preoperative Assessment Center  [facility]  Clinic and Surgery Center  Phone: [phone]  Fax: [phone]

## 2023-10-03 NOTE — PATIENT INSTRUCTIONS
Name:  Shayy Ndiaye   MRN:  [id number]   :  1980   Today's Date:  10/3/2023         You were seen today for a pre-operative assessment in the:    Pre-operative Anesthesia Assessment Center(PAC)  [facility]  [address] [place] [address]  phone [phone]      You will be receiving a call with location, date, arrival time and diet instructions from Preadmission Nursing at your surgical site:    -[facility]: [phone]   -[facility]: [phone]  -[facility]: [phone]  -[facility]: [phone]  -[facility]: [phone]  -[facility]: [phone]        Anesthesia recommendations for medications:    Hold Aspirin for 7 days before procedure.  Hold Multivitamins for 7 days before procedure.   Hold Herbal medications and Supplements for 7 days before procedure.  Hold Ibuprofen for 1 day before procedure.   Hold Naproxen for 4 days before procedure.     No alcohol or cannabis products for 24 hours before your procedure         Please DO NOT take the following medications the day of procedure:    Not applicable    Please take these medications the day of procedure:    Tylenol if needed      How do I prepare myself?  - Please take 2 showers (one the night prior to surgery and one the morning of surgery) using Scrubcare or Hibiclens soap.    Use this soap only from the neck to your toes.     Leave the soap on your skin for one minute--then rinse thoroughly.      You may use your own shampoo and conditioner. No other hair products.   - Please remove all jewelry and body piercings.  - No lotions, deodorants or fragrance.  - No makeup or fingernail polish.   - Bring your ID and insurance card.    -If you have a Deep Brain Stimulator, a Spinal Cord Stimulator, or any implanted Neuro Device, you must bring the remote to your appointment         For further questions regarding your surgery please call your surgeon's office.

## 2023-10-04 ENCOUNTER — ANESTHESIA (OUTPATIENT)
Dept: SURGERY | Facility: CLINIC | Age: 43
DRG: 742 | End: 2023-10-04
Payer: COMMERCIAL

## 2023-10-04 ENCOUNTER — HOSPITAL ENCOUNTER (INPATIENT)
Facility: CLINIC | Age: 43
LOS: 2 days | Discharge: HOME | DRG: 742 | End: 2023-10-06
Attending: OBSTETRICS & GYNECOLOGY | Admitting: OBSTETRICS & GYNECOLOGY
Payer: COMMERCIAL

## 2023-10-04 DIAGNOSIS — Z90.710 S/P TOTAL ABDOMINAL HYSTERECTOMY: Primary | ICD-10-CM

## 2023-10-04 LAB — HCG SERPL QL: NEGATIVE

## 2023-10-04 PROCEDURE — 250N000011 HC RX IP 250 OP 636: Mod: JZ | Performed by: NURSE ANESTHETIST, CERTIFIED REGISTERED

## 2023-10-04 PROCEDURE — 88331 PATH CONSLTJ SURG 1 BLK 1SPC: CPT | Mod: TC | Performed by: OBSTETRICS & GYNECOLOGY

## 2023-10-04 PROCEDURE — 88112 CYTOPATH CELL ENHANCE TECH: CPT | Mod: 26 | Performed by: PATHOLOGY

## 2023-10-04 PROCEDURE — 0DNW0ZZ RELEASE PERITONEUM, OPEN APPROACH: ICD-10-PCS | Performed by: OBSTETRICS & GYNECOLOGY

## 2023-10-04 PROCEDURE — 88305 TISSUE EXAM BY PATHOLOGIST: CPT | Mod: TC | Performed by: OBSTETRICS & GYNECOLOGY

## 2023-10-04 PROCEDURE — 258N000003 HC RX IP 258 OP 636: Performed by: ANESTHESIOLOGY

## 2023-10-04 PROCEDURE — 88305 TISSUE EXAM BY PATHOLOGIST: CPT | Mod: 26 | Performed by: PATHOLOGY

## 2023-10-04 PROCEDURE — 88331 PATH CONSLTJ SURG 1 BLK 1SPC: CPT | Mod: 26 | Performed by: STUDENT IN AN ORGANIZED HEALTH CARE EDUCATION/TRAINING PROGRAM

## 2023-10-04 PROCEDURE — 250N000009 HC RX 250: Performed by: NURSE ANESTHETIST, CERTIFIED REGISTERED

## 2023-10-04 PROCEDURE — 250N000009 HC RX 250: Performed by: OBSTETRICS & GYNECOLOGY

## 2023-10-04 PROCEDURE — 250N000011 HC RX IP 250 OP 636: Mod: JZ

## 2023-10-04 PROCEDURE — 84703 CHORIONIC GONADOTROPIN ASSAY: CPT | Performed by: OBSTETRICS & GYNECOLOGY

## 2023-10-04 PROCEDURE — 250N000011 HC RX IP 250 OP 636: Performed by: OBSTETRICS & GYNECOLOGY

## 2023-10-04 PROCEDURE — 272N000001 HC OR GENERAL SUPPLY STERILE: Performed by: OBSTETRICS & GYNECOLOGY

## 2023-10-04 PROCEDURE — 0UT90ZZ RESECTION OF UTERUS, OPEN APPROACH: ICD-10-PCS | Performed by: OBSTETRICS & GYNECOLOGY

## 2023-10-04 PROCEDURE — 250N000009 HC RX 250: Performed by: ANESTHESIOLOGY

## 2023-10-04 PROCEDURE — 250N000013 HC RX MED GY IP 250 OP 250 PS 637: Performed by: OBSTETRICS & GYNECOLOGY

## 2023-10-04 PROCEDURE — 250N000011 HC RX IP 250 OP 636: Performed by: ANESTHESIOLOGY

## 2023-10-04 PROCEDURE — 120N000001 HC R&B MED SURG/OB

## 2023-10-04 PROCEDURE — 250N000011 HC RX IP 250 OP 636: Mod: JZ | Performed by: ANESTHESIOLOGY

## 2023-10-04 PROCEDURE — 250N000009 HC RX 250: Performed by: STUDENT IN AN ORGANIZED HEALTH CARE EDUCATION/TRAINING PROGRAM

## 2023-10-04 PROCEDURE — 88332 PATH CONSLTJ SURG EA ADD BLK: CPT | Mod: 26 | Performed by: STUDENT IN AN ORGANIZED HEALTH CARE EDUCATION/TRAINING PROGRAM

## 2023-10-04 PROCEDURE — 0UT70ZZ RESECTION OF BILATERAL FALLOPIAN TUBES, OPEN APPROACH: ICD-10-PCS | Performed by: OBSTETRICS & GYNECOLOGY

## 2023-10-04 PROCEDURE — 0TJB8ZZ INSPECTION OF BLADDER, VIA NATURAL OR ARTIFICIAL OPENING ENDOSCOPIC: ICD-10-PCS | Performed by: OBSTETRICS & GYNECOLOGY

## 2023-10-04 PROCEDURE — 370N000017 HC ANESTHESIA TECHNICAL FEE, PER MIN: Performed by: OBSTETRICS & GYNECOLOGY

## 2023-10-04 PROCEDURE — 0UT10ZZ RESECTION OF LEFT OVARY, OPEN APPROACH: ICD-10-PCS | Performed by: OBSTETRICS & GYNECOLOGY

## 2023-10-04 PROCEDURE — 258N000003 HC RX IP 258 OP 636: Performed by: STUDENT IN AN ORGANIZED HEALTH CARE EDUCATION/TRAINING PROGRAM

## 2023-10-04 PROCEDURE — 360N000077 HC SURGERY LEVEL 4, PER MIN: Performed by: OBSTETRICS & GYNECOLOGY

## 2023-10-04 PROCEDURE — 710N000009 HC RECOVERY PHASE 1, LEVEL 1, PER MIN: Performed by: OBSTETRICS & GYNECOLOGY

## 2023-10-04 PROCEDURE — 88307 TISSUE EXAM BY PATHOLOGIST: CPT | Mod: 26 | Performed by: STUDENT IN AN ORGANIZED HEALTH CARE EDUCATION/TRAINING PROGRAM

## 2023-10-04 PROCEDURE — 258N000003 HC RX IP 258 OP 636: Performed by: OBSTETRICS & GYNECOLOGY

## 2023-10-04 PROCEDURE — 36415 COLL VENOUS BLD VENIPUNCTURE: CPT | Performed by: OBSTETRICS & GYNECOLOGY

## 2023-10-04 PROCEDURE — 999N000128 HC STATISTIC PERIPHERAL IV START W/O US GUIDANCE

## 2023-10-04 PROCEDURE — 999N000141 HC STATISTIC PRE-PROCEDURE NURSING ASSESSMENT: Performed by: OBSTETRICS & GYNECOLOGY

## 2023-10-04 RX ORDER — PROPOFOL 10 MG/ML
INJECTION, EMULSION INTRAVENOUS CONTINUOUS PRN
Status: DISCONTINUED | OUTPATIENT
Start: 2023-10-04 | End: 2023-10-04

## 2023-10-04 RX ORDER — FENTANYL CITRATE 50 UG/ML
25 INJECTION, SOLUTION INTRAMUSCULAR; INTRAVENOUS EVERY 5 MIN PRN
Status: DISCONTINUED | OUTPATIENT
Start: 2023-10-04 | End: 2023-10-04 | Stop reason: HOSPADM

## 2023-10-04 RX ORDER — LIDOCAINE HYDROCHLORIDE 20 MG/ML
INJECTION, SOLUTION INFILTRATION; PERINEURAL PRN
Status: DISCONTINUED | OUTPATIENT
Start: 2023-10-04 | End: 2023-10-04

## 2023-10-04 RX ORDER — AMOXICILLIN 250 MG
2 CAPSULE ORAL 2 TIMES DAILY
Status: DISCONTINUED | OUTPATIENT
Start: 2023-10-04 | End: 2023-10-06 | Stop reason: HOSPADM

## 2023-10-04 RX ORDER — KETOROLAC TROMETHAMINE 30 MG/ML
30 INJECTION, SOLUTION INTRAMUSCULAR; INTRAVENOUS EVERY 6 HOURS
Status: DISCONTINUED | OUTPATIENT
Start: 2023-10-04 | End: 2023-10-05

## 2023-10-04 RX ORDER — CALCIUM CARBONATE 500 MG/1
500 TABLET, CHEWABLE ORAL 4 TIMES DAILY PRN
Status: DISCONTINUED | OUTPATIENT
Start: 2023-10-04 | End: 2023-10-06 | Stop reason: HOSPADM

## 2023-10-04 RX ORDER — NALOXONE HYDROCHLORIDE 0.4 MG/ML
0.2 INJECTION, SOLUTION INTRAMUSCULAR; INTRAVENOUS; SUBCUTANEOUS
Status: DISCONTINUED | OUTPATIENT
Start: 2023-10-04 | End: 2023-10-06 | Stop reason: HOSPADM

## 2023-10-04 RX ORDER — AMOXICILLIN 250 MG
1 CAPSULE ORAL 2 TIMES DAILY
Status: DISCONTINUED | OUTPATIENT
Start: 2023-10-04 | End: 2023-10-06 | Stop reason: HOSPADM

## 2023-10-04 RX ORDER — HEPARIN SODIUM 5000 [USP'U]/.5ML
5000 INJECTION, SOLUTION INTRAVENOUS; SUBCUTANEOUS
Status: COMPLETED | OUTPATIENT
Start: 2023-10-04 | End: 2023-10-04

## 2023-10-04 RX ORDER — HYDROMORPHONE HCL IN WATER/PF 6 MG/30 ML
0.4 PATIENT CONTROLLED ANALGESIA SYRINGE INTRAVENOUS EVERY 5 MIN PRN
Status: DISCONTINUED | OUTPATIENT
Start: 2023-10-04 | End: 2023-10-04 | Stop reason: HOSPADM

## 2023-10-04 RX ORDER — METRONIDAZOLE 500 MG/100ML
500 INJECTION, SOLUTION INTRAVENOUS
Status: COMPLETED | OUTPATIENT
Start: 2023-10-04 | End: 2023-10-04

## 2023-10-04 RX ORDER — HYDROXYZINE HYDROCHLORIDE 25 MG/1
25 TABLET, FILM COATED ORAL EVERY 6 HOURS PRN
Status: DISCONTINUED | OUTPATIENT
Start: 2023-10-04 | End: 2023-10-06 | Stop reason: HOSPADM

## 2023-10-04 RX ORDER — ACETAMINOPHEN 325 MG/1
650 TABLET ORAL EVERY 6 HOURS
Status: DISCONTINUED | OUTPATIENT
Start: 2023-10-04 | End: 2023-10-06 | Stop reason: HOSPADM

## 2023-10-04 RX ORDER — FAMOTIDINE 20 MG/1
20 TABLET, FILM COATED ORAL 2 TIMES DAILY
Status: DISCONTINUED | OUTPATIENT
Start: 2023-10-04 | End: 2023-10-06 | Stop reason: HOSPADM

## 2023-10-04 RX ORDER — FENTANYL CITRATE 50 UG/ML
50 INJECTION, SOLUTION INTRAMUSCULAR; INTRAVENOUS EVERY 5 MIN PRN
Status: DISCONTINUED | OUTPATIENT
Start: 2023-10-04 | End: 2023-10-04 | Stop reason: HOSPADM

## 2023-10-04 RX ORDER — DEXAMETHASONE SODIUM PHOSPHATE 4 MG/ML
INJECTION, SOLUTION INTRA-ARTICULAR; INTRALESIONAL; INTRAMUSCULAR; INTRAVENOUS; SOFT TISSUE PRN
Status: DISCONTINUED | OUTPATIENT
Start: 2023-10-04 | End: 2023-10-04

## 2023-10-04 RX ORDER — LIDOCAINE 40 MG/G
CREAM TOPICAL
Status: DISCONTINUED | OUTPATIENT
Start: 2023-10-04 | End: 2023-10-04 | Stop reason: HOSPADM

## 2023-10-04 RX ORDER — PHENAZOPYRIDINE HYDROCHLORIDE 200 MG/1
200 TABLET, FILM COATED ORAL ONCE
Status: COMPLETED | OUTPATIENT
Start: 2023-10-04 | End: 2023-10-04

## 2023-10-04 RX ORDER — MAGNESIUM HYDROXIDE 1200 MG/15ML
LIQUID ORAL PRN
Status: DISCONTINUED | OUTPATIENT
Start: 2023-10-04 | End: 2023-10-04 | Stop reason: HOSPADM

## 2023-10-04 RX ORDER — HYDROXYZINE HYDROCHLORIDE 50 MG/ML
50 INJECTION, SOLUTION INTRAMUSCULAR ONCE
Status: COMPLETED | OUTPATIENT
Start: 2023-10-04 | End: 2023-10-04

## 2023-10-04 RX ORDER — HYDROMORPHONE HCL IN WATER/PF 6 MG/30 ML
0.2 PATIENT CONTROLLED ANALGESIA SYRINGE INTRAVENOUS EVERY 5 MIN PRN
Status: DISCONTINUED | OUTPATIENT
Start: 2023-10-04 | End: 2023-10-04 | Stop reason: HOSPADM

## 2023-10-04 RX ORDER — NALOXONE HYDROCHLORIDE 0.4 MG/ML
0.4 INJECTION, SOLUTION INTRAMUSCULAR; INTRAVENOUS; SUBCUTANEOUS
Status: DISCONTINUED | OUTPATIENT
Start: 2023-10-04 | End: 2023-10-06 | Stop reason: HOSPADM

## 2023-10-04 RX ORDER — CEFAZOLIN SODIUM/WATER 2 G/20 ML
2 SYRINGE (ML) INTRAVENOUS SEE ADMIN INSTRUCTIONS
Status: DISCONTINUED | OUTPATIENT
Start: 2023-10-04 | End: 2023-10-04 | Stop reason: HOSPADM

## 2023-10-04 RX ORDER — SIMETHICONE 80 MG
80 TABLET,CHEWABLE ORAL 4 TIMES DAILY PRN
Status: DISCONTINUED | OUTPATIENT
Start: 2023-10-04 | End: 2023-10-06 | Stop reason: HOSPADM

## 2023-10-04 RX ORDER — PROPOFOL 10 MG/ML
INJECTION, EMULSION INTRAVENOUS PRN
Status: DISCONTINUED | OUTPATIENT
Start: 2023-10-04 | End: 2023-10-04

## 2023-10-04 RX ORDER — SIMETHICONE 80 MG
80 TABLET,CHEWABLE ORAL EVERY 6 HOURS PRN
Status: DISCONTINUED | OUTPATIENT
Start: 2023-10-04 | End: 2023-10-04

## 2023-10-04 RX ORDER — SODIUM CHLORIDE, SODIUM LACTATE, POTASSIUM CHLORIDE, CALCIUM CHLORIDE 600; 310; 30; 20 MG/100ML; MG/100ML; MG/100ML; MG/100ML
INJECTION, SOLUTION INTRAVENOUS CONTINUOUS
Status: DISCONTINUED | OUTPATIENT
Start: 2023-10-04 | End: 2023-10-04 | Stop reason: HOSPADM

## 2023-10-04 RX ORDER — POLYETHYLENE GLYCOL 3350 17 G/17G
17 POWDER, FOR SOLUTION ORAL DAILY PRN
Status: DISCONTINUED | OUTPATIENT
Start: 2023-10-04 | End: 2023-10-06 | Stop reason: HOSPADM

## 2023-10-04 RX ORDER — LIDOCAINE 40 MG/G
CREAM TOPICAL
Status: DISCONTINUED | OUTPATIENT
Start: 2023-10-04 | End: 2023-10-06 | Stop reason: HOSPADM

## 2023-10-04 RX ORDER — OXYCODONE HYDROCHLORIDE 5 MG/1
5 TABLET ORAL EVERY 4 HOURS PRN
Status: DISCONTINUED | OUTPATIENT
Start: 2023-10-04 | End: 2023-10-06 | Stop reason: HOSPADM

## 2023-10-04 RX ORDER — ONDANSETRON 2 MG/ML
4 INJECTION INTRAMUSCULAR; INTRAVENOUS EVERY 30 MIN PRN
Status: DISCONTINUED | OUTPATIENT
Start: 2023-10-04 | End: 2023-10-04 | Stop reason: HOSPADM

## 2023-10-04 RX ORDER — HYDROMORPHONE HCL IN WATER/PF 6 MG/30 ML
.1-.2 PATIENT CONTROLLED ANALGESIA SYRINGE INTRAVENOUS
Status: DISCONTINUED | OUTPATIENT
Start: 2023-10-04 | End: 2023-10-06 | Stop reason: HOSPADM

## 2023-10-04 RX ORDER — ONDANSETRON 4 MG/1
4 TABLET, ORALLY DISINTEGRATING ORAL EVERY 30 MIN PRN
Status: DISCONTINUED | OUTPATIENT
Start: 2023-10-04 | End: 2023-10-04 | Stop reason: HOSPADM

## 2023-10-04 RX ORDER — ONDANSETRON 2 MG/ML
INJECTION INTRAMUSCULAR; INTRAVENOUS PRN
Status: DISCONTINUED | OUTPATIENT
Start: 2023-10-04 | End: 2023-10-04

## 2023-10-04 RX ORDER — METHOCARBAMOL 500 MG/1
500 TABLET, FILM COATED ORAL EVERY 6 HOURS
Status: DISCONTINUED | OUTPATIENT
Start: 2023-10-05 | End: 2023-10-06 | Stop reason: HOSPADM

## 2023-10-04 RX ORDER — LIDOCAINE 4 G/G
1 PATCH TOPICAL
Status: DISCONTINUED | OUTPATIENT
Start: 2023-10-04 | End: 2023-10-06 | Stop reason: HOSPADM

## 2023-10-04 RX ORDER — POLYETHYLENE GLYCOL 3350 17 G/17G
17 POWDER, FOR SOLUTION ORAL DAILY
Status: DISCONTINUED | OUTPATIENT
Start: 2023-10-04 | End: 2023-10-04

## 2023-10-04 RX ORDER — SCOLOPAMINE TRANSDERMAL SYSTEM 1 MG/1
1 PATCH, EXTENDED RELEASE TRANSDERMAL ONCE
Status: COMPLETED | OUTPATIENT
Start: 2023-10-04 | End: 2023-10-05

## 2023-10-04 RX ORDER — FENTANYL CITRATE 50 UG/ML
INJECTION, SOLUTION INTRAMUSCULAR; INTRAVENOUS PRN
Status: DISCONTINUED | OUTPATIENT
Start: 2023-10-04 | End: 2023-10-04

## 2023-10-04 RX ORDER — ONDANSETRON 4 MG/1
4 TABLET, ORALLY DISINTEGRATING ORAL EVERY 6 HOURS PRN
Status: DISCONTINUED | OUTPATIENT
Start: 2023-10-04 | End: 2023-10-06 | Stop reason: HOSPADM

## 2023-10-04 RX ORDER — METHOCARBAMOL 500 MG/1
500 TABLET, FILM COATED ORAL EVERY 6 HOURS PRN
Status: DISCONTINUED | OUTPATIENT
Start: 2023-10-04 | End: 2023-10-04

## 2023-10-04 RX ORDER — SODIUM CHLORIDE, SODIUM LACTATE, POTASSIUM CHLORIDE, CALCIUM CHLORIDE 600; 310; 30; 20 MG/100ML; MG/100ML; MG/100ML; MG/100ML
INJECTION, SOLUTION INTRAVENOUS CONTINUOUS
Status: DISCONTINUED | OUTPATIENT
Start: 2023-10-04 | End: 2023-10-05

## 2023-10-04 RX ORDER — IBUPROFEN 600 MG/1
600 TABLET, FILM COATED ORAL EVERY 6 HOURS
Status: DISCONTINUED | OUTPATIENT
Start: 2023-10-04 | End: 2023-10-04

## 2023-10-04 RX ORDER — CEFAZOLIN SODIUM/WATER 2 G/20 ML
2 SYRINGE (ML) INTRAVENOUS
Status: COMPLETED | OUTPATIENT
Start: 2023-10-04 | End: 2023-10-04

## 2023-10-04 RX ORDER — OXYCODONE HYDROCHLORIDE 5 MG/1
10 TABLET ORAL EVERY 4 HOURS PRN
Status: DISCONTINUED | OUTPATIENT
Start: 2023-10-04 | End: 2023-10-06 | Stop reason: HOSPADM

## 2023-10-04 RX ADMIN — FENTANYL CITRATE 50 MCG: 50 INJECTION, SOLUTION INTRAMUSCULAR; INTRAVENOUS at 10:56

## 2023-10-04 RX ADMIN — HEPARIN SODIUM 5000 UNITS: 5000 INJECTION, SOLUTION INTRAVENOUS; SUBCUTANEOUS at 10:02

## 2023-10-04 RX ADMIN — ROCURONIUM BROMIDE 20 MG: 50 INJECTION, SOLUTION INTRAVENOUS at 12:17

## 2023-10-04 RX ADMIN — ROCURONIUM BROMIDE 50 MG: 50 INJECTION, SOLUTION INTRAVENOUS at 10:56

## 2023-10-04 RX ADMIN — ROCURONIUM BROMIDE 20 MG: 50 INJECTION, SOLUTION INTRAVENOUS at 13:04

## 2023-10-04 RX ADMIN — PROPOFOL 20 MG: 10 INJECTION, EMULSION INTRAVENOUS at 14:38

## 2023-10-04 RX ADMIN — PROPOFOL 200 MG: 10 INJECTION, EMULSION INTRAVENOUS at 10:56

## 2023-10-04 RX ADMIN — SUGAMMADEX 200 MG: 100 INJECTION, SOLUTION INTRAVENOUS at 14:47

## 2023-10-04 RX ADMIN — HYDROMORPHONE HYDROCHLORIDE 0.2 MG: 0.2 INJECTION, SOLUTION INTRAMUSCULAR; INTRAVENOUS; SUBCUTANEOUS at 19:22

## 2023-10-04 RX ADMIN — SODIUM CHLORIDE, POTASSIUM CHLORIDE, SODIUM LACTATE AND CALCIUM CHLORIDE: 600; 310; 30; 20 INJECTION, SOLUTION INTRAVENOUS at 09:56

## 2023-10-04 RX ADMIN — FENTANYL CITRATE 50 MCG: 50 INJECTION, SOLUTION INTRAMUSCULAR; INTRAVENOUS at 13:06

## 2023-10-04 RX ADMIN — DEXMEDETOMIDINE HYDROCHLORIDE 20 MCG: 100 INJECTION, SOLUTION INTRAVENOUS at 16:45

## 2023-10-04 RX ADMIN — ACETAMINOPHEN 650 MG: 325 TABLET, FILM COATED ORAL at 18:54

## 2023-10-04 RX ADMIN — FENTANYL CITRATE 50 MCG: 50 INJECTION, SOLUTION INTRAMUSCULAR; INTRAVENOUS at 11:13

## 2023-10-04 RX ADMIN — SIMETHICONE 80 MG: 80 TABLET, CHEWABLE ORAL at 18:55

## 2023-10-04 RX ADMIN — METRONIDAZOLE 500 MG: 500 INJECTION, SOLUTION INTRAVENOUS at 10:00

## 2023-10-04 RX ADMIN — PHENAZOPYRIDINE 200 MG: 200 TABLET ORAL at 09:56

## 2023-10-04 RX ADMIN — LIDOCAINE HYDROCHLORIDE 100 MG: 20 INJECTION, SOLUTION INFILTRATION; PERINEURAL at 10:56

## 2023-10-04 RX ADMIN — FAMOTIDINE 20 MG: 20 TABLET ORAL at 22:01

## 2023-10-04 RX ADMIN — FENTANYL CITRATE 50 MCG: 50 INJECTION, SOLUTION INTRAMUSCULAR; INTRAVENOUS at 15:17

## 2023-10-04 RX ADMIN — MIDAZOLAM 2 MG: 1 INJECTION INTRAMUSCULAR; INTRAVENOUS at 10:53

## 2023-10-04 RX ADMIN — ONDANSETRON 4 MG: 4 TABLET, ORALLY DISINTEGRATING ORAL at 18:56

## 2023-10-04 RX ADMIN — HYDROMORPHONE HYDROCHLORIDE 0.4 MG: 0.2 INJECTION, SOLUTION INTRAMUSCULAR; INTRAVENOUS; SUBCUTANEOUS at 15:46

## 2023-10-04 RX ADMIN — HYDROXYZINE HYDROCHLORIDE 50 MG: 50 INJECTION, SOLUTION INTRAMUSCULAR at 15:58

## 2023-10-04 RX ADMIN — FENTANYL CITRATE 50 MCG: 50 INJECTION, SOLUTION INTRAMUSCULAR; INTRAVENOUS at 15:05

## 2023-10-04 RX ADMIN — DEXAMETHASONE SODIUM PHOSPHATE 4 MG: 4 INJECTION, SOLUTION INTRA-ARTICULAR; INTRALESIONAL; INTRAMUSCULAR; INTRAVENOUS; SOFT TISSUE at 11:11

## 2023-10-04 RX ADMIN — SODIUM CHLORIDE, POTASSIUM CHLORIDE, SODIUM LACTATE AND CALCIUM CHLORIDE: 600; 310; 30; 20 INJECTION, SOLUTION INTRAVENOUS at 14:00

## 2023-10-04 RX ADMIN — SCOPALAMINE 1 PATCH: 1 PATCH, EXTENDED RELEASE TRANSDERMAL at 10:20

## 2023-10-04 RX ADMIN — MIDAZOLAM 1 MG: 1 INJECTION INTRAMUSCULAR; INTRAVENOUS at 15:24

## 2023-10-04 RX ADMIN — HYDROMORPHONE HYDROCHLORIDE 0.4 MG: 0.2 INJECTION, SOLUTION INTRAMUSCULAR; INTRAVENOUS; SUBCUTANEOUS at 15:32

## 2023-10-04 RX ADMIN — PROPOFOL 150 MCG/KG/MIN: 10 INJECTION, EMULSION INTRAVENOUS at 10:56

## 2023-10-04 RX ADMIN — METHOCARBAMOL 500 MG: 500 TABLET ORAL at 18:54

## 2023-10-04 RX ADMIN — ONDANSETRON 4 MG: 2 INJECTION INTRAMUSCULAR; INTRAVENOUS at 14:17

## 2023-10-04 RX ADMIN — HYDROXYZINE HYDROCHLORIDE 25 MG: 25 TABLET, FILM COATED ORAL at 19:50

## 2023-10-04 RX ADMIN — DEXMEDETOMIDINE HYDROCHLORIDE 20 MCG: 100 INJECTION, SOLUTION INTRAVENOUS at 16:25

## 2023-10-04 RX ADMIN — FENTANYL CITRATE 50 MCG: 50 INJECTION, SOLUTION INTRAMUSCULAR; INTRAVENOUS at 14:39

## 2023-10-04 RX ADMIN — ROCURONIUM BROMIDE 30 MG: 50 INJECTION, SOLUTION INTRAVENOUS at 11:33

## 2023-10-04 RX ADMIN — HYDROMORPHONE HYDROCHLORIDE 0.4 MG: 0.2 INJECTION, SOLUTION INTRAMUSCULAR; INTRAVENOUS; SUBCUTANEOUS at 16:01

## 2023-10-04 RX ADMIN — HYDROMORPHONE HYDROCHLORIDE 0.5 MG: 1 INJECTION, SOLUTION INTRAMUSCULAR; INTRAVENOUS; SUBCUTANEOUS at 11:34

## 2023-10-04 RX ADMIN — SODIUM CHLORIDE, POTASSIUM CHLORIDE, SODIUM LACTATE AND CALCIUM CHLORIDE: 600; 310; 30; 20 INJECTION, SOLUTION INTRAVENOUS at 18:58

## 2023-10-04 RX ADMIN — KETOROLAC TROMETHAMINE 30 MG: 30 INJECTION, SOLUTION INTRAMUSCULAR; INTRAVENOUS at 19:21

## 2023-10-04 RX ADMIN — PROPOFOL 40 MG: 10 INJECTION, EMULSION INTRAVENOUS at 14:31

## 2023-10-04 RX ADMIN — HYDROMORPHONE HYDROCHLORIDE 0.2 MG: 0.2 INJECTION, SOLUTION INTRAMUSCULAR; INTRAVENOUS; SUBCUTANEOUS at 22:28

## 2023-10-04 RX ADMIN — Medication 2 G: at 10:53

## 2023-10-04 RX ADMIN — Medication 1 LOZENGE: at 22:30

## 2023-10-04 ASSESSMENT — ACTIVITIES OF DAILY LIVING (ADL)
ADLS_ACUITY_SCORE: 18
ADLS_ACUITY_SCORE: 18
ADLS_ACUITY_SCORE: 20
ADLS_ACUITY_SCORE: 35
ADLS_ACUITY_SCORE: 18

## 2023-10-04 ASSESSMENT — LIFESTYLE VARIABLES: TOBACCO_USE: 0

## 2023-10-04 ASSESSMENT — ENCOUNTER SYMPTOMS
SEIZURES: 0
DYSRHYTHMIAS: 0

## 2023-10-04 ASSESSMENT — COPD QUESTIONNAIRES: COPD: 0

## 2023-10-04 NOTE — PROGRESS NOTES
Gynecologic Oncology Postoperative Check Note  10/4/2023    S: Patient reports she is doing okay postoperatively. Pain is moderately well controlled with current pain regimen. Pearson in place. Not yet ambulating. Tolerating crackers and water without nausea or vomiting. Denies chest pain, shortness of breath, dizziness, or other concerns at this time. Endorses feeling overwhelmed about procedure. States having some SI. No plan    O:  Vitals:    10/04/23 1615 10/04/23 1630 10/04/23 1645 10/04/23 1700   BP: (!) 163/113 (!) 144/98 (!) 136/97 (!) 141/93   Pulse: 97 91 96 91   Resp: 20 17 19 14   Temp:    98  F (36.7  C)   TempSrc:       SpO2: 100% 100% 100% 98%   Weight:       Height:           Gen: NAD  Cardio: RRR, no murmurs  Resp: CTAB, no wheezing or crackles  Abdomen: soft, appropriately tender, incision c/d/i  Extremities: Non-tender, no LE edema    A: 43 year old POD#0 s/p Dx Lsc > DARA, LSO, BS, Christiana. Doing well in the immediate postoperative period. VSS.    # Post-operative state  - Dz: Pelvic Mass > Frozen on   FEN: ADAT, LR at 125  Pain: Annetta Tylenol, Toradol, Robaxain, Hydroxyzine. PRN oxy.  Heme: Hgb 12> > AM CBC ordered.  CV: NI  Pulm: NI  GI: Annetta bowel reg. PRN antiemetics, simethincone.  : Pearson in place, plan to remove POD#1  ID: Afebrile. S/p pre-op abx.   Endo: NI  Psych/Neuro/MSK: Endorsed SI on post op check. Will consult SW in AM  PPX: SCDs, IS     Dispo: Pending postoperative goals      Socrates Cordova MD, MPH  Gynecologic Oncology Resident PGY-3  10/4/2023 6:30 PM    GYN Oncology Contact Info:  [phone] weekdays: [place] resident pager [phone]   [phone] & weekends: Off site cross cover resident [phone]   Please include 10-digit call back number as cross cover resident is off site at [facility].

## 2023-10-04 NOTE — BRIEF OP NOTE
[facility]    Brief Operative Note    Pre-operative diagnosis: Pelvic mass [R19.00]  Post-operative diagnosis Same as pre-operative diagnosis    Procedure: DIAGNOSTIC LAPAROSCOPY, Bilateral - Abdomen  MODIFIED RAICAL TOTAL ABDOMINAL HYSTERECTOMY, LEFT SALPINGO-OOPHORECTOMY, RIGHT SALPINGECTOMY, CYSTOSCOPY, PELVIC WASHINGS, Bilateral - Abdomen  EXPLORATORY LAPAROTOMY, LYSIS OF ADHESIONS FOR GREATER THAN AN HOUR, N/A - Abdomen    Surgeon: Surgeon(s) and Role:     * Radha Perez MD - Primary     * Socrates Cordova MD  Anesthesia: General   Estimated Blood Loss: 400 mL from 10/4/2023 10:53 AM to 10/4/2023  2:55 PM      Drains: None  Specimens:   ID Type Source Tests Collected by Time Destination   1 : Pelvic Washings Washings Pelvis NON-GYNECOLOGIC CYTOLOGY Radha Perez MD 10/4/2023 11:23 AM    2 : UTERUS, CERVIX, LEFT FALLOPIAN TUBE, LEFT OVARY, RIGHT FALLOPIAN TUBE Tissue Uterus, Cervix, Left Fallopian Tube & Ovary SURGICAL PATHOLOGY EXAM Radha Perez MD 10/4/2023 12:50 PM      Findings:  - On laparoscopy, no signs of trauma on entry, large adhesive mass of sigmoid colon and likely right fallopian tube and ovary. This includes the lateral left aspect of the uterus. 3 cm non vascular appearing structure that appears to orignate from left lateral left liver lobe vs right side of spleen Grossly normal gallbladder and stomach. Normal appearing lef fallopian tubes and ovaries. Decision was made to pen via vertical midline. Defect in mesentery adjacent to sigmoid coon repaired with interrupted sutures.  - Cystoscopy with no signs of bladder trauma and patent ureters with urinary efflux visible from both orifices      Complications: None.  Implants: Pearson left at end of case      Socrates Cordova MD, MPH  Gynecologic Oncology Resident PGY-3  10/4/2023 3:33 PM    GYN Oncology Contact Info:  [phone] weekdays: [facility] resident pager [phone]   [phone] &  weekends: Off site cross cover resident [phone]   Please include 10-digit call back number as cross cover resident is off site at [facility].

## 2023-10-04 NOTE — ANESTHESIA POSTPROCEDURE EVALUATION
Patient: Shayy Ndiaye    Procedure: Procedure(s):  DIAGNOSTIC LAPAROSCOPY  MODIFIED RAICAL TOTAL ABDOMINAL HYSTERECTOMY, LEFT SALPINGO-OOPHORECTOMY, RIGHT SALPINGECTOMY, CYSTOSCOPY, PELVIC WASHINGS  EXPLORATORY LAPAROTOMY, LYSIS OF ADHESIONS FOR GREATER THAN AN HOUR       Anesthesia Type:  General    Note:  Disposition: Inpatient   Postop Pain Control: Uneventful            Sign Out: Well controlled pain   PONV: No   Neuro/Psych: Uneventful            Sign Out: Acceptable/Baseline neuro status   Airway/Respiratory: Uneventful            Sign Out: Acceptable/Baseline resp. status   CV/Hemodynamics: Uneventful            Sign Out: Acceptable CV status   Other NRE: NONE   DID A NON-ROUTINE EVENT OCCUR?            Last vitals:  Vitals Value Taken Time   /93 10/04/23 1700   Temp 36.7  C (98  F) 10/04/23 1700   Pulse 104 10/04/23 1708   Resp 22 10/04/23 1708   SpO2 99 % 10/04/23 1708   Vitals shown include unvalidated device data.    Electronically Signed By: Jayson Burns MD  October 4, 2023  5:45 PM

## 2023-10-04 NOTE — ANESTHESIA PREPROCEDURE EVALUATION
Anesthesia Pre-Procedure Evaluation    Patient: Shayy Ndiaye   MRN: [id number] : 1980        Procedure : Procedure(s):  Diagnostic laparoscopy, removal of one ovary and fallopian tube, possible removal of uterus, cervix, contra-lateral ovary and fallopian tube, possible cancer surgery, possible open procedure, possible bowel resection, possible ostomy  Colectomy          Past Medical History:   Diagnosis Date    Anxiety     Endometriosis     Motion sickness       Past Surgical History:   Procedure Laterality Date    COLONOSCOPY N/A 2023    Procedure: COLONOSCOPY, FLEXIBLE, WITH LESION REMOVAL USING SNARE;  Surgeon: Wei Rice MD;  Location:  GI    D & C      elected AB    DILATION AND CURETTAGE, HYSTEROSCOPY DIAGNOSTIC, COMBINED N/A 10/6/2020    Procedure: HYSTEROSCOPY, DILATATION CURETTAGE;  Surgeon: Mino Urias MD;  Location:  OR     TOOTH EXTRACTION W/FORCEP      LAPAROSCOPIC LYSIS ADHESIONS N/A 10/6/2020    Procedure: LYSIS OF ADHESIONS;  Surgeon: Mino Urias MD;  Location:  OR    LAPAROSCOPY DIAGNOSTIC (GYN)  2008    endo    LAPAROSCOPY DIAGNOSTIC (GYN)  2008    endo    LASER CO2 LAPAROSCOPIC VAPORIZATION ENDOMETRIUM N/A 10/6/2020    Procedure: LAPAROSCOPY WITH CO2 LASER, LASER VAPORIZATION OF ENDOMETRIOSIS;  Surgeon: Mino Urias MD;  Location:  OR    TONSILLECTOMY & ADENOIDECTOMY        Allergies   Allergen Reactions    Dust Mite Extract      STUFFY NOSE    Erythromycin     Minocycline Unknown and Nausea      Social History     Tobacco Use    Smoking status: Never    Smokeless tobacco: Never   Substance Use Topics    Alcohol use: Not Currently     Comment: Occassional alcohol abuse but now sober      Wt Readings from Last 1 Encounters:   10/04/23 91.6 kg (201 lb 14.4 oz)        Anesthesia Evaluation        History of anesthetic complications  - PONV.      ROS/MED HX  ENT/Pulmonary:     (+)     DINORA risk factors, snores loudly,                               (-) tobacco use, asthma, COPD and recent URI   Neurologic:    (-) no seizures, no CVA and no TIA   Cardiovascular:     (+)  - -   -  - -                                 No previous cardiac testing  (-) taking anticoagulants/antiplatelets and arrhythmias   METS/Exercise Tolerance: >4 METS Comment: Walking a mile at a time without exertional symptoms.    Hematologic:    (-) history of blood clots and history of blood transfusion   Musculoskeletal:  - neg musculoskeletal ROS     GI/Hepatic:    (-) GERD and liver disease   Renal/Genitourinary: Comment: Ovarian mass  Endometriosis   (-) renal disease   Endo:     (+)               Obesity (BMI 30),    (-) chronic steroid usage   Psychiatric/Substance Use:     (+) psychiatric history anxiety       Infectious Disease:    (-) Recent Fever   Malignancy:       Other:            Physical Exam    Airway        Mallampati: I   TM distance: > 3 FB   Neck ROM: full   Mouth opening: > 3 cm    Respiratory Devices and Support         Dental     Comment: Lower metal retainer - permanent      (+) Completely normal teeth      Cardiovascular          Rhythm and rate: regular and normal     Pulmonary           breath sounds clear to auscultation           OUTSIDE LABS:  CBC:   Lab Results   Component Value Date    WBC 7.5 09/28/2023    WBC 6.0 04/20/2022    HGB 12.0 09/28/2023    HGB 12.1 04/20/2022    HCT 37.0 09/28/2023    HCT 39.1 04/20/2022     09/28/2023     04/20/2022     BMP:   Lab Results   Component Value Date     09/28/2023     06/15/2023    POTASSIUM 3.7 09/28/2023    POTASSIUM 4.6 06/15/2023    CHLORIDE 105 09/28/2023    CHLORIDE 103 06/15/2023    CO2 24 09/28/2023    CO2 21 (L) 06/15/2023    BUN 9.0 09/28/2023    BUN 10.6 06/15/2023    CR 0.72 09/28/2023    CR 0.71 06/15/2023    GLC 93 10/03/2023     (H) 09/28/2023     COAGS: No results found for: PTT, INR, FIBR  POC:   Lab Results   Component Value Date    HCGS Negative 10/04/2023      HEPATIC:   Lab Results   Component Value Date    ALBUMIN 4.5 09/28/2023    PROTTOTAL 7.5 09/28/2023    ALT 18 09/28/2023    AST 21 09/28/2023    ALKPHOS 97 09/28/2023    BILITOTAL <0.2 09/28/2023     OTHER:   Lab Results   Component Value Date    A1C 5.9 (H) 06/15/2023    WOJCIECH 9.6 09/28/2023    TSH 1.51 06/15/2023    CRP 8.1 (H) 04/20/2022       Anesthesia Plan    ASA Status:  2    NPO Status:  NPO Appropriate    Anesthesia Type: General.     - Airway: ETT   Induction: Intravenous, Propofol.   Maintenance: TIVA.        Consents    Anesthesia Plan(s) and associated risks, benefits, and realistic alternatives discussed. Questions answered and patient/representative(s) expressed understanding.     - Discussed: Risks, Benefits and Alternatives for the PROCEDURE were discussed     - Discussed with:  Patient, Parent (Mother and/or Father)            Postoperative Care    Pain management: Multi-modal analgesia, IV analgesics, Oral pain medications.   PONV prophylaxis: Ondansetron (or other 5HT-3), Dexamethasone or Solumedrol, Scopolamine patch     Comments:                Jocelyn Sidhu MD

## 2023-10-04 NOTE — TELEPHONE ENCOUNTER
Form completed for FMLA and faxed to [employer] at [phone]. Form completed for STD and faxed to [facility] at [phone]. Patient aware that forms were completed and faxed today.    Dipika Guevara RN, BSN, OCN

## 2023-10-04 NOTE — OR NURSING
Patient having pain 10/10, and feeling anxious. TONYA Fallon notified. Ok to give 50 vistaril IM and 1 mg Versed.

## 2023-10-04 NOTE — ANESTHESIA CARE TRANSFER NOTE
Patient: Shayy Ndiaye    Procedure: Procedure(s):  DIAGNOSTIC LAPAROSCOPY  MODIFIED RAICAL TOTAL ABDOMINAL HYSTERECTOMY, LEFT SALPINGO-OOPHORECTOMY, RIGHT SALPINGECTOMY, CYSTOSCOPY, PELVIC WASHINGS  EXPLORATORY LAPAROTOMY, LYSIS OF ADHESIONS FOR GREATER THAN AN HOUR       Diagnosis: Pelvic mass [R19.00]  Diagnosis Additional Information: No value filed.    Anesthesia Type:   General     Note:    Oropharynx: oropharynx clear of all foreign objects  Level of Consciousness: awake  Oxygen Supplementation: face mask    Independent Airway: airway patency satisfactory and stable  Dentition: dentition unchanged  Vital Signs Stable: post-procedure vital signs reviewed and stable    Patient transferred to: PACU    Handoff Report: Identifed the Patient, Identified the Reponsible Provider, Reviewed the pertinent medical history, Discussed the surgical course, Reviewed Intra-OP anesthesia mangement and issues during anesthesia, Set expectations for post-procedure period and Allowed opportunity for questions and acknowledgement of understanding            Electronically Signed By: SANNA Fu CRNA  October 4, 2023  2:54 PM

## 2023-10-04 NOTE — ANESTHESIA PROCEDURE NOTES
Airway         Procedure Start/Stop Times: 10/4/2023 10:59 AM  Staff -        Anesthesiologist:  Jocelyn Sidhu MD       CRNA: Jahaira Larsen APRN CRNA       Performed By: CRNA  Consent for Airway        Urgency: elective  Indications and Patient Condition       Indications for airway management: zully-procedural       Induction type:intravenous       Mask difficulty assessment: 1 - vent by mask    Final Airway Details       Final airway type: endotracheal airway       Successful airway: ETT - single  Endotracheal Airway Details        ETT size (mm): 7.0       Cuffed: yes       Successful intubation technique: video laryngoscopy       VL Blade Size: Glidescope 3       Grade View of Cords: 1       Adjucts: stylet       Position: Right       Measured from: lips       Secured at (cm): 20       Bite block used: None    Post intubation assessment        Placement verified by: capnometry, equal breath sounds and chest rise        Number of attempts at approach: 1       Number of other approaches attempted: 0       Secured with: silk tape       Ease of procedure: easy       Dentition: Intact and Unchanged    Medication(s) Administered   Medication Administration Time: 10/4/2023 10:59 AM

## 2023-10-05 ENCOUNTER — PATIENT OUTREACH (OUTPATIENT)
Dept: ONCOLOGY | Facility: CLINIC | Age: 43
End: 2023-10-05
Payer: COMMERCIAL

## 2023-10-05 LAB
ALBUMIN SERPL BCG-MCNC: 3.6 G/DL (ref 3.5–5.2)
ALP SERPL-CCNC: 70 U/L (ref 35–104)
ALT SERPL W P-5'-P-CCNC: 31 U/L (ref 0–50)
ANION GAP SERPL CALCULATED.3IONS-SCNC: 13 MMOL/L (ref 7–15)
AST SERPL W P-5'-P-CCNC: 27 U/L (ref 0–45)
BILIRUB SERPL-MCNC: 0.6 MG/DL
BUN SERPL-MCNC: 9.4 MG/DL (ref 6–20)
CALCIUM SERPL-MCNC: 8.6 MG/DL (ref 8.6–10)
CHLORIDE SERPL-SCNC: 102 MMOL/L (ref 98–107)
CREAT SERPL-MCNC: 0.66 MG/DL (ref 0.51–0.95)
DEPRECATED HCO3 PLAS-SCNC: 22 MMOL/L (ref 22–29)
EGFRCR SERPLBLD CKD-EPI 2021: >90 ML/MIN/1.73M2
ERYTHROCYTE [DISTWIDTH] IN BLOOD BY AUTOMATED COUNT: 12.9 % (ref 10–15)
GLUCOSE SERPL-MCNC: 131 MG/DL (ref 70–99)
HCT VFR BLD AUTO: 32.2 % (ref 35–47)
HGB BLD-MCNC: 10.5 G/DL (ref 11.7–15.7)
MCH RBC QN AUTO: 29.2 PG (ref 26.5–33)
MCHC RBC AUTO-ENTMCNC: 32.6 G/DL (ref 31.5–36.5)
MCV RBC AUTO: 89 FL (ref 78–100)
PLATELET # BLD AUTO: 321 10E3/UL (ref 150–450)
POTASSIUM SERPL-SCNC: 3.9 MMOL/L (ref 3.4–5.3)
PROT SERPL-MCNC: 6.1 G/DL (ref 6.4–8.3)
RBC # BLD AUTO: 3.6 10E6/UL (ref 3.8–5.2)
SODIUM SERPL-SCNC: 137 MMOL/L (ref 135–145)
WBC # BLD AUTO: 10.5 10E3/UL (ref 4–11)

## 2023-10-05 PROCEDURE — 250N000013 HC RX MED GY IP 250 OP 250 PS 637

## 2023-10-05 PROCEDURE — 80053 COMPREHEN METABOLIC PANEL: CPT | Performed by: OBSTETRICS & GYNECOLOGY

## 2023-10-05 PROCEDURE — 120N000001 HC R&B MED SURG/OB

## 2023-10-05 PROCEDURE — 250N000013 HC RX MED GY IP 250 OP 250 PS 637: Performed by: OBSTETRICS & GYNECOLOGY

## 2023-10-05 PROCEDURE — 36415 COLL VENOUS BLD VENIPUNCTURE: CPT | Performed by: OBSTETRICS & GYNECOLOGY

## 2023-10-05 PROCEDURE — 258N000003 HC RX IP 258 OP 636: Performed by: OBSTETRICS & GYNECOLOGY

## 2023-10-05 PROCEDURE — 250N000011 HC RX IP 250 OP 636: Mod: JZ | Performed by: OBSTETRICS & GYNECOLOGY

## 2023-10-05 PROCEDURE — 85014 HEMATOCRIT: CPT | Performed by: OBSTETRICS & GYNECOLOGY

## 2023-10-05 PROCEDURE — 99254 IP/OBS CNSLTJ NEW/EST MOD 60: CPT

## 2023-10-05 PROCEDURE — 99024 POSTOP FOLLOW-UP VISIT: CPT | Performed by: OBSTETRICS & GYNECOLOGY

## 2023-10-05 RX ORDER — IBUPROFEN 600 MG/1
600 TABLET, FILM COATED ORAL EVERY 6 HOURS PRN
Status: DISCONTINUED | OUTPATIENT
Start: 2023-10-05 | End: 2023-10-06 | Stop reason: HOSPADM

## 2023-10-05 RX ADMIN — KETOROLAC TROMETHAMINE 30 MG: 30 INJECTION, SOLUTION INTRAMUSCULAR; INTRAVENOUS at 01:25

## 2023-10-05 RX ADMIN — FAMOTIDINE 20 MG: 20 TABLET ORAL at 10:23

## 2023-10-05 RX ADMIN — ACETAMINOPHEN 650 MG: 325 TABLET, FILM COATED ORAL at 17:40

## 2023-10-05 RX ADMIN — LIDOCAINE 1 PATCH: 4 PATCH TOPICAL at 00:11

## 2023-10-05 RX ADMIN — SENNOSIDES AND DOCUSATE SODIUM 2 TABLET: 50; 8.6 TABLET ORAL at 10:23

## 2023-10-05 RX ADMIN — METHOCARBAMOL 500 MG: 500 TABLET ORAL at 12:39

## 2023-10-05 RX ADMIN — ACETAMINOPHEN 650 MG: 325 TABLET, FILM COATED ORAL at 23:16

## 2023-10-05 RX ADMIN — ACETAMINOPHEN 650 MG: 325 TABLET, FILM COATED ORAL at 00:11

## 2023-10-05 RX ADMIN — LIDOCAINE 1 PATCH: 4 PATCH TOPICAL at 19:52

## 2023-10-05 RX ADMIN — METHOCARBAMOL 500 MG: 500 TABLET ORAL at 01:25

## 2023-10-05 RX ADMIN — METHOCARBAMOL 500 MG: 500 TABLET ORAL at 18:56

## 2023-10-05 RX ADMIN — ACETAMINOPHEN 650 MG: 325 TABLET, FILM COATED ORAL at 06:58

## 2023-10-05 RX ADMIN — OXYCODONE HYDROCHLORIDE 10 MG: 5 TABLET ORAL at 00:10

## 2023-10-05 RX ADMIN — OXYCODONE HYDROCHLORIDE 10 MG: 5 TABLET ORAL at 04:45

## 2023-10-05 RX ADMIN — FAMOTIDINE 20 MG: 20 TABLET ORAL at 19:51

## 2023-10-05 RX ADMIN — IBUPROFEN 600 MG: 600 TABLET ORAL at 17:40

## 2023-10-05 RX ADMIN — OXYCODONE HYDROCHLORIDE 5 MG: 5 TABLET ORAL at 18:58

## 2023-10-05 RX ADMIN — SENNOSIDES AND DOCUSATE SODIUM 2 TABLET: 50; 8.6 TABLET ORAL at 19:50

## 2023-10-05 RX ADMIN — OXYCODONE HYDROCHLORIDE 5 MG: 5 TABLET ORAL at 14:45

## 2023-10-05 RX ADMIN — IBUPROFEN 600 MG: 600 TABLET ORAL at 10:23

## 2023-10-05 RX ADMIN — SODIUM CHLORIDE, POTASSIUM CHLORIDE, SODIUM LACTATE AND CALCIUM CHLORIDE: 600; 310; 30; 20 INJECTION, SOLUTION INTRAVENOUS at 04:44

## 2023-10-05 RX ADMIN — OXYCODONE HYDROCHLORIDE 10 MG: 5 TABLET ORAL at 23:15

## 2023-10-05 RX ADMIN — ACETAMINOPHEN 650 MG: 325 TABLET, FILM COATED ORAL at 12:39

## 2023-10-05 RX ADMIN — METHOCARBAMOL 500 MG: 500 TABLET ORAL at 06:58

## 2023-10-05 RX ADMIN — OXYCODONE HYDROCHLORIDE 10 MG: 5 TABLET ORAL at 10:23

## 2023-10-05 ASSESSMENT — ACTIVITIES OF DAILY LIVING (ADL)
ADLS_ACUITY_SCORE: 24
ADLS_ACUITY_SCORE: 25
ADLS_ACUITY_SCORE: 24
ADLS_ACUITY_SCORE: 21
ADLS_ACUITY_SCORE: 24
ADLS_ACUITY_SCORE: 21
ADLS_ACUITY_SCORE: 25
ADLS_ACUITY_SCORE: 24

## 2023-10-05 NOTE — PROGRESS NOTES
Received call from Lyndsey Cooper regarding patient's FMLA form, part C was not completed. Informed Lyndsey that writer will addend the form and refax the entire FMLA form at [phone].  Fax completed.    Dipika Guevara RN, BSN, OCN

## 2023-10-05 NOTE — PROGRESS NOTES
"Gynecology Oncology Progress Note  October 5, 2023    Ms. Shayy Ndiaye is a 43 year old POD# 1 s/p Dx Lsc > DARA, LSO, RS    Dz: Endometriosis    Subjective: Pain well controlled. Ambulating without dizziness. Tolerating PO without nausea or vomiting. Not passing flatus. Lang still in place. Denies fevers, chills, chest pain, SOB. No other questions or concerns.    Objective:   BP (!) 142/88 (BP Location: Right arm, Cuff Size: Adult Regular)   Pulse 100   Temp 99.5  F (37.5  C) (Axillary)   Resp 16   Ht 1.727 m (5' 8\")   Wt 91.6 kg (201 lb 14.4 oz)   LMP 09/23/2023   SpO2 97%   BMI 30.70 kg/m      General: in NAD  CV: Warm, well perfused  Resp: sating well on room air, no increased work of breathing  Abdomen: soft, minimally tender, non distended  Incision: dermabond in place, c/d/i  Extremities: nontender, trace edema  Lines/Drains: PIV, Lang    I/Os  (Yesterday // Since Midnight)  IV: 1600 mL // Not recorded  PO: Not recorded  Urine 2050 mL // 1200 mL    New labs/imaging-  AM CBC, BMP pending    Assessment: 43year old POD#1 s/p Dx Lsc > DARA, LSO, BS, [place]. Doing well in the immediate postoperative period. VSS.     Post-operative State  Endometriosis  Pathology on frozen was suggestive of Endometriosis. Given complex nature of the pelvic mass the left fallopian tube and ovary were inseparable from posterior uterus. From a postoperative standpoint Shayy is very early in her course. Will plan for lang removal and discontinuing of IVF today. Will continue to await bowel function.  - Pain: Tylenol, Ibuprofen, robaxin and PRN opiates  - Regular Diet  - Encouraged ambulation in room and hallway today.    SI  Shayy endorsed SI in the evening surrounding the surgery not going as expected and being in the hospital. Shayy did not endorse a plan stating \"what can I do I am here\". Triggering events for her include inadequate pain control in recovery and regimen when she got to the floor in addition to surgery not " going as expected. Shayy does not have a history of SI ideation. In the evening Shayy stated she would be open to talking with someone in the AM about how she was feeling. Shayy does not have a therapist but expressed an interest in finding one to help process surgery.   - follow up psych and social work consults today      Disposition: Pending postoperative goals    Socrates Cordova MD, MPH  Gynecologic Oncology Resident PGY-3  10/5/2023 6:54 AM    GYN Oncology Contact Info:  [phone] weekdays: [facility] pager [phone]   [phone] & weekends: Off site cross cover resident [phone]   Please include 10-digit call back number as cross cover resident is off site at [facility].     Provider Disclosure:   I agree with above History, Review of Systems, Physical exam and Plan. I have reviewed the content of the documentation and have edited it as needed. I have seen and personally performed the services documented here and the documentation accurately represents those services and the decisions I have made.     Electronically signed by:   Mark Muniz MD   Gynecologic Oncology   [facility]

## 2023-10-05 NOTE — PROGRESS NOTES
"BRIEF NUTRITION ASSESSMENT    REASON FOR ASSESSMENT:  Shayy Ndiaye is a 43 year old female seen by Registered Dietitian for Admission Nutrition Risk Screen for positive. Wt loss of 2-13# w/ decreased appetite reported.   Comments: \"this is what brought pt to the MD and now having surgery\"     NUTRITION HISTORY:  PMH of Anxiety, Endometriosis, and Motion sickness     Pt reported she typically eats well, has a good appetite. 15# wt loss over past year d/t  symptoms which brought her in here and have now been resolved w/ surgery. She had \"pelvic crowding on stomach and bowels\" causing symptoms.     CURRENT DIET AND INTAKE:  Diet: Regular            Pt declined nutrition concerns. Stated she had breakfast this AM and ate most of it, tolerated it well. \"It was delicious.\" Appetite is coming back.  Menu is good. Having protein w/ meals. RD provided encouragement for PO intake for post-op healing. Pt declined ONS.     ANTHROPOMETRICS:  Height: 5' 8\"  Weight:  201 lbs 14.4 oz  Body mass index is 30.7 kg/m .   Weight Status: Obesity Grade I BMI 30-34.9  IBW:  63.6 kg  %IBW: 144%  Weight History: no significant wt loss noted   10/04/23 : 91.6 kg (201 lb 14.4 oz)   10/03/23 : 93.2 kg (205 lb 6.4 oz)   09/26/23 : 92.1 kg (203 lb 1.6 oz)   09/12/23 : 90.6 kg (199 lb 11.2 oz)   08/31/23 : 91.6 kg (202 lb)   06/15/23 : 92.5 kg (204 lb)   04/20/22 : 93 kg (205 lb)   11/03/20 : 97.1 kg (214 lb)     Care everywhere--   94.5 kg (208 lb 4.8 oz) 03/26/2019     LABS:  Labs noted    MEDICATIONS:  Medications noted    MALNUTRITION:  Patient does not meet two of the following criteria necessary for diagnosing malnutrition.     % Weight Loss:  Weight loss does not meet criteria for malnutrition   % Intake:  Decreased intake does not meet criteria for malnutrition   Subcutaneous Fat Loss:  None observed  Muscle Loss:  None observed  Fluid Retention:  None noted    NUTRITION INTERVENTION:  Nutrition Diagnosis:  No nutrition diagnosis at this " time.    Implementation:  Nutrition Education    FOLLOW UP/MONITORING:   Will re-evaluate in 7 - 10 days, or sooner, if formally consulted.      Mayda Espinoza RD, LD

## 2023-10-05 NOTE — CONSULTS
"      Initial Psychiatric Consult   Consult date: 2023         Reason for Consult, requesting source:    Suicidal ideation  Requesting source: Leodan Gonzalez    Labs and imaging reviewed. Discussed with nursing.        HPI:   Shayy Ndiaye is a 43 year old female with a long history of endometriosis who underwent a hysterectomy and left oophorectomy on 10/4/23. She had been experiencing increased abdominal pain and constipation, leading her to seek care from her gyn/onc physician Dr. Lisa Alcocer. Outpatient workup was concerning for possible malignancy which added a certain degree of urgency for Shayy. She was aware that a hysterectomy was a possible outcome of this surgery, depending on intraoperative findings, and had consented to this but this was not her ideal outcome. In the post-op period, she had expressed suicidal ideation prompting psychiatric consult.    I met with Shayy in her room today, she is seen laying in bed and appears fairly comfortable though careful in her movements. She is easily tearful when discussing loss of ability to bear children. She has never had any children; had one  when she was young and there is some component of feeling this is punishment. We discussed grief, loss, identity, and lack of control. She states that immediately upon waking in post-op, she was in extreme pain which she described as \"excruciating\" and this combined with realization that she no longer had the option to have children lead her to a very dark mental state where she began having thoughts such as \"I can't do this\" and \"I don't want to live like this\", as well as thoughts of passive suicidal ideation. She denies that she had any plan or intent to harm herself. She no longer is having any suicidal thoughts and no longer feels hopeless- states this improved with improvement in pain and time to process everything. She feels she will be safe both here and at home. She does think it would " be helpful to get connected to a therapist to help her cope and process this significant life changing event.        Past Psychiatric History:   She endorses a history of struggling with anxiety, alcohol misuse but never received any psychiatric assessment or treatment. No history of psychiatric admissions.        Substance Use and History:   She endorses a history of alcohol misuse but is now sober. Never received any formal chemical dependency treatment.        Past Medical History:   PAST MEDICAL HISTORY:   Past Medical History:   Diagnosis Date    Anxiety     Endometriosis     Motion sickness        PAST SURGICAL HISTORY:   Past Surgical History:   Procedure Laterality Date    COLONOSCOPY N/A 9/20/2023    Procedure: COLONOSCOPY, FLEXIBLE, WITH LESION REMOVAL USING SNARE;  Surgeon: Wei Rice MD;  Location:  GI    D & C  1999    elected AB    DILATION AND CURETTAGE, HYSTEROSCOPY DIAGNOSTIC, COMBINED N/A 10/6/2020    Procedure: HYSTEROSCOPY, DILATATION CURETTAGE;  Surgeon: Mino Urias MD;  Location:  OR    HC TOOTH EXTRACTION W/FORCEP      HYSTERECTOMY, TOTAL, ABDOMINAL, WITH SALPINGO-OOPHORECTOMY, CYSTOSCOPY Bilateral 10/4/2023    Procedure: MODIFIED RAICAL TOTAL ABDOMINAL HYSTERECTOMY, LEFT SALPINGO-OOPHORECTOMY, RIGHT SALPINGECTOMY, CYSTOSCOPY, PELVIC WASHINGS;  Surgeon: Radha Perez MD;  Location:  OR    LAPAROSCOPIC LYSIS ADHESIONS N/A 10/6/2020    Procedure: LYSIS OF ADHESIONS;  Surgeon: Mino Urias MD;  Location:  OR    LAPAROSCOPY DIAGNOSTIC (GYN)  05/2008    endo    LAPAROSCOPY DIAGNOSTIC (GYN)  09/2008    endo    LAPAROSCOPY DIAGNOSTIC (GYN) Bilateral 10/4/2023    Procedure: DIAGNOSTIC LAPAROSCOPY;  Surgeon: Radha Perez MD;  Location:  OR    LAPAROTOMY, LYSIS ADHESIONS, COMBINED N/A 10/4/2023    Procedure: EXPLORATORY LAPAROTOMY, LYSIS OF ADHESIONS FOR GREATER THAN AN HOUR;  Surgeon: Radha Perez MD;  Location:  OR    LASER CO2  LAPAROSCOPIC VAPORIZATION ENDOMETRIUM N/A 10/6/2020    Procedure: LAPAROSCOPY WITH CO2 LASER, LASER VAPORIZATION OF ENDOMETRIOSIS;  Surgeon: Mino Urias MD;  Location:  OR    TONSILLECTOMY & ADENOIDECTOMY               Family History:   FAMILY HISTORY:   Family History   Problem Relation Age of Onset    Hyperlipidemia Mother     Breast Cancer Mother     Diabetes Father     No Known Problems Sister     No Known Problems Brother     No Known Problems Maternal Grandmother     No Known Problems Maternal Grandfather     Bone Cancer Paternal Grandmother     No Known Problems Paternal Grandfather     Breast Cancer Maternal Aunt     Cancer Maternal Uncle         head and neck    Diabetes Paternal Aunt     No Known Problems Other     Deep Vein Thrombosis (DVT) No family hx of            Social History:   SOCIAL HISTORY:   Social History     Tobacco Use    Smoking status: Never    Smokeless tobacco: Never   Substance Use Topics    Alcohol use: Not Currently     Comment: Occassional alcohol abuse but now sober       She lives with her partner of several years, they have a townhouse. She states that this is a safe relationship. She works from home in a stable job.          Physical ROS:   The 10 point Review of Systems is negative other than noted in the HPI or here.           Medications:      acetaminophen  650 mg Oral Q6H    famotidine  20 mg Oral BID    Or    famotidine  20 mg Intravenous BID    lidocaine  1 patch Transdermal Q24h    methocarbamol  500 mg Oral Q6H    scopolamine   Transdermal Q8H    senna-docusate  1 tablet Oral BID    Or    senna-docusate  2 tablet Oral BID    sodium chloride (PF)  3 mL Intracatheter Q8H              Allergies:     Allergies   Allergen Reactions    Dust Mite Extract      STUFFY NOSE    Erythromycin     Minocycline Unknown and Nausea          Labs:     Recent Results (from the past 48 hour(s))   HCG qualitative    Collection Time: 10/04/23  8:57 AM   Result Value Ref Range    hCG  "Serum Qualitative Negative Negative   CBC with platelets    Collection Time: 10/05/23  6:40 AM   Result Value Ref Range    WBC Count 10.5 4.0 - 11.0 10e3/uL    RBC Count 3.60 (L) 3.80 - 5.20 10e6/uL    Hemoglobin 10.5 (L) 11.7 - 15.7 g/dL    Hematocrit 32.2 (L) 35.0 - 47.0 %    MCV 89 78 - 100 fL    MCH 29.2 26.5 - 33.0 pg    MCHC 32.6 31.5 - 36.5 g/dL    RDW 12.9 10.0 - 15.0 %    Platelet Count 321 150 - 450 10e3/uL   Comprehensive metabolic panel    Collection Time: 10/05/23  6:40 AM   Result Value Ref Range    Sodium 137 135 - 145 mmol/L    Potassium 3.9 3.4 - 5.3 mmol/L    Carbon Dioxide (CO2) 22 22 - 29 mmol/L    Anion Gap 13 7 - 15 mmol/L    Urea Nitrogen 9.4 6.0 - 20.0 mg/dL    Creatinine 0.66 0.51 - 0.95 mg/dL    GFR Estimate >90 >60 mL/min/1.73m2    Calcium 8.6 8.6 - 10.0 mg/dL    Chloride 102 98 - 107 mmol/L    Glucose 131 (H) 70 - 99 mg/dL    Alkaline Phosphatase 70 35 - 104 U/L    AST 27 0 - 45 U/L    ALT 31 0 - 50 U/L    Protein Total 6.1 (L) 6.4 - 8.3 g/dL    Albumin 3.6 3.5 - 5.2 g/dL    Bilirubin Total 0.6 <=1.2 mg/dL          Physical and Psychiatric Examination:     /70 (BP Location: Right arm)   Pulse 95   Temp 98.4  F (36.9  C) (Oral)   Resp 16   Ht 1.727 m (5' 8\")   Wt 91.6 kg (201 lb 14.4 oz)   LMP 09/23/2023   SpO2 96%   BMI 30.70 kg/m    Weight is 201 lbs 14.4 oz  Body mass index is 30.7 kg/m .    Physical Exam:  I have reviewed the physical exam as documented by by the medical team and agree with findings and assessment and have no additional findings to add at this time.    Mental Status Exam:    Appearance: awake, alert and adequately groomed  Attitude:  cooperative  Eye Contact:  good  Mood:  anxious and sad   Affect:  appropriate and in normal range and mood congruent  Speech:  clear, coherent and somewhat pressured but felt to be related to anxiety  Psychomotor Behavior:  no evidence of tardive dyskinesia, dystonia, or tics  Thought Process:  logical, linear, and goal " oriented  Associations:  no loose associations  Thought Content:  no evidence of suicidal ideation or homicidal ideation and no evidence of psychotic thought  Insight:  good  Judgement:  intact  Oriented to:  time, person, and place  Attention Span and Concentration:  intact  Recent and Remote Memory:  intact               DSM-5 Diagnosis:   Recent suicidal ideation  Grief reaction  Unspecified anxiety             Assessment:   Shayy Ndiaye is a 43 year old female with no formal psychiatric history but who reports she has struggled with anxiety. She is one day post op from a hysterectomy, left oophorectomy for treatment of endometriosis, rendering her now unable to bear children. This is a significant life changing event that carries elements of grief, identity, and purpose. I strongly encouraged her to allow us to set her up with a therapist on outpatient basis to help her process, cope, and accept all of this as it will be an ongoing process for her- she was agreeable to this. She anticipates being in the hospital at least for tomorrow, possibly Saturday, so I will also ask our HP to meet with her for some additional supportive therapy in the mean time.    She no longer expresses suicidal ideation, denies any intent or plan to harm herself, contracts for safety, and feels she will also be safe at home. Suicidal ideation came on in context of post-op pain as well as emotional distress but she did not act on these thoughts. Overall suicide risk felt to be low, no need for suicide precautions or sitter.          Summary of Recommendations:   No longer suicidal, contracts for safety. No need for suicide precautions or sitter.  LMHP to see tomorrow 10/6 for supportive therapy and to assist with scheduling outpatient therapy appointment.      SANNA Gomez CNP    Consult/Liaison Psychiatry   [facility]    Contact information available via [facility] Paging/Directory  If I am not available, then NUNO JONES  line ([phone]) should know who is covering our consult service.

## 2023-10-05 NOTE — PLAN OF CARE
Goal Outcome Evaluation:      Plan of Care Reviewed With: patient, family    Overall Patient Progress: improvingOverall Patient Progress: improving       SUMMARY: Total abdominal hysterectomy on 10/4    DATE & TIME: 10/5/2023 0584-1250   Cognitive Concerns/ Orientation : A&O x 4   BEHAVIOR & AGGRESSION TOOL COLOR: green  ABNL VS/O2: tachy  MOBILITY: IND in room and halls  PAIN MANAGMENT: abdominal binder per pt request/comfort. Scheduled tylenol and robaxin. Prn ibuprofen and oxy 5mg  DIET: regular  BOWEL/BLADDER: lang removed voiding well  ABNL LAB/BG: none  DRAIN/DEVICES:  R PIV SL  TELEMETRY RHYTHM: NA  SKIN: abdominal incision, laparoscopy sites - No drainage.   TESTS/PROCEDURES: none new  D/C DATE: home tomorrow  A/o x4.  Pain managed with binder, scheduled tylenol and robaxin, prn oxy 5mg and ibuprofen.  Tachy other VSS.  Lang removed voiding adequately.  Tolerating regular diet.  Incision to midline and lap sites with surgical glue CDI.  Ambulating in room and halls IND.  Probable discharge to home tomorrow.

## 2023-10-05 NOTE — OP NOTE
Gynecologic Oncology Operative Report    10/4/2023  Shayy Ndiaye  [id number]    PREOPERATIVE DIAGNOSIS: Ovarian mass, elevated     POSTOPERATIVE DIAGNOSIS: No evidence of malignancy on frozen section analysis, final pathology pending    PROCEDURES: Diagnostic laparoscopy, exploratory laparotomy, modified radical hysterectomy, left salpingo-oophorectomy, right salpingectomy, lysis of adhesions for > 1 hour, cystoscopy    SURGEON: Radha Gonzalez MD     ASSISTANTS:  Socrates Cordova MD, PGY-3.     ANESTHESIA: General endotracheal     ESTIMATED BLOOD LOSS: 400 cc     IV FLUIDS: 1300 cc crystalloid    URINE OUTPUT: 500 cc clear urine     INDICATIONS: Shayy Ndiaye is a 43 year old who presented with bowel changes and pain.  She underwent a pelvic US which revealed a complex cyst of the left ovary.   was obtained and was elevated at 188.  She thus had a pelvic MRI which revealed a heterogeneous left adnexal mass arising from the left ovary measuring 7.1 cm surrounding the adjacent sigmoid colon with a normal appearing right ovary.  She thus had a CT C/A/P which confirmed the left adnexal mass as well as indeterminate liver lesions, a few subcentimeter pulmonary nodules and a sclerotic foci in the left iliac bone as well as a mildly enlarged zully-portal lymph node.  She had a colonoscopy which was normal aside from a single polyp and some mild compression related to an external mass.  PET/CT showed FDG avid activity in the left adnexal mass alone.  Following a thorough discussion of the risks, benefits, indications and alternatives she consented to the above procedure.    FINDINGS: On EUA the patient had normal external genitalia and a mass adherent to the uterus which was quite mobile.  On laparoscopy the left ovary was incompletely visualized as it was covered by the sigmoid colon and bladder which were densely adherent.  The right ovary appeared grossly normal.  There was no evidence of miliary disease.   There was noted to be a mass arising from the liver edge  on the left inferior liver tip which also appeared adherent to the spleen.  On laparotomy, the bowel was run without evidence of disease aside from the sigmoid colon which was densely adherent to the left adnexal mass.  The omentum was grossly free of disease.  The bilateral diaphragms and right liver were smooth.  There was a nodular mass at the inferior pole of the spleen which was also adherent by a small stalk to the left inferior liver which was mobile.  During the procedure, I did discuss with surgical oncology the findings of this mass and recommendation was not to proceed with resection as this is likely a benign lesion of the liver given her age and the benign frozen section from her ovary.  The right ovary and fallopian tube were grossly normal in appearance.  The left ovary was completely replaced by a complex cystic and solid mass which was densely adherent to and inseparable from the posterior uterus. The bladder was draped over this mass and densely adherent.  The left ureter was coursing directly into this mass.  The sigmoid colon mesentery was involved with this process and there was residual fibrotic tissue left on the mesentery. Frozen section results showed no evidence of malignancy.  On cystoscopy there was no evidence of bladder injury or foreign body and there was brisk efflux noted from the bilateral ureteral orifices.    SPECIMENS:   Pelvic washings  Uterus, cervix, left ovary and bilateral fallopian tubes    COMPLICATIONS: None.     CONDITION: Stable to PACU.     PROCEDURE:   Consent was reviewed with the patient in the preoperative setting and confirmed. She received prophylactic antibiotics. In addition, she received heparin for venous thrombosis prevention. The patient was transferred to the operating room and placed in dorsal supine position. General anesthetic was obtained in the usual manner without noted difficulties. The  [facility] and an exam under anesthesia was performed with findings as described above.  The patient was prepped and draped for the above-mentioned procedure and Pearson catheter was then placed under sterile techniques.  Timeout was called at which point the patient's name, procedure and operative site was confirmed by the operative team. Attention was then turned to the upper abdomen. Initially, an incision was made at the umbilicus and the Veress needle introduced through this stab incision. The abdomen was insufflated with an opening pressure of 4 mmHg. The Veress needle was removed. The initial midline 5 mm port was inserted without difficulties and initial survey revealed no damage to underlying structures.  The right lateral 5 mm port was then placed under direct visualization without any injury noted to underlying structures. At this point, the patient was placed into steep Trendelenburg. The pelvis was inspected as well as the upper abdomen with findings as noted above. Pelvic washings were obtained and sent for cytology. Given the findings, the decision was made to convert to an open procedure. A midline vertical skin incision was then made from the level of pubic symphysis and ultimately extending above the umbilicus. Incision was performed sharply and carried through the subcutaneous layer with cautery. Fascia was incised and extended superiorly and inferiorly. Peritoneum was then elevated and entered sharply and the two 5 mm ports were then removed. Peritoneal incision was extended without any injury to nearby structures. At this point, exploration of the pelvis and upper abdomen was performed. The Bookwalter retractor was positioned and utilized throughout the course of the procedure. The entirety of the bowels were run without evidence of disease with the exception of the sigmoid colon being densely adherent to the left adnexa.  We then carefully examined the liver  lesion that was noted on laparoscopy.  Bowels were packed up into the upper abdomen and appropriate retractors positioned. We initiated the procedure by carefully and sharply taking down the significant and dense adhesions from the bladder and sigmoid colon to the left adnexa.  We then entered the left retroperitoneum with cautery and identified the left ureter.  We performed ureterolysis and isolated out the left IP ligament which was cauterized and divided with the LigaSure device.  We then attempted to free the left adnexa from the posterior uterus however they were inseparable.  We thus made the decision that a hysterectomy would be required to resect the left adnexa.  We thus continued lysis of adhesions further until the sigmoid colon was freed from the left adnexa, however there was still fibrotic tissue on the sigmoid colon remaining. We then continued to take down the adhesions to the bladder which was draped over the adnexa and round ligament.  There was fibrosis and dense adhesions infiltrating into the left pelvic sidewall and the mass was adherent to the iliac vessels which were carefully dissected off.  We continued ureterolysis until the uterine artery was reached and cauterized and divided ensuring the safety of the underlying ureter.  We continued to free the left adnexa from the significant dense adhesions laterally until it was finally freed.  In this process the mass was ruptured with release of chocolate brown fluid consistent with endometrioma.    Attention was then turned to the right and we extended the peritoneal incision of the right aspect of the psoas muscle and the right retroperitoneal space was entered.  The right ureter was identified deep in the pelvis.  The right fallopian tube was elevated and the mesosalpinx was cauterized and divided to the level of the uterine cornua.  The right utero-ovarian ligament was next cauterized and divided and the right ovary was packed into the upper  abdomen. The peritoneal incision over the lower uterine segment was incised. The vesicouterine peritoneum was then fully developed. The bladder was retracted to the level of the upper vagina. Bilateral uterine arteries were now isolated which did require some lysis of adhesions and further uterolysis on the left side.  The bilateral uterine arteries were then clamped in a serial manner. We then cut and suture ligated with Vicryl.  The bilateral cardinal ligaments were next clamped and cut and suture ligated with Vicryl. We proceeded until we could isolate out the uterosacral ligaments, which were similarly cut and suture ligated.  The apex of the vagina was then clamped and the flash scissors was used to perform the colpotomy and the full cervix was resected along with the uterus and the left ovary and fallopian tube as well as the right fallopian tube which was sent for frozen section which did return without evidence of malignancy. The vaginal cuff was now grasped and suture ligated with interrupted figure of eight Vicryl sutures with excellent reapproximation and good hemostasis. There was noted to be a small serosal defect in the sigmoid colon which was inherent to the procedure as it was where the colon had been densely adherent to the mass and this was oversewn.  There were noted to be two defects in the sigmoid colon mesentery which were closed with Vicryl.  We then consulted with surgical oncology regarding the liver lesion and given there was no evidence of cancer, recommendation was to not proceed with resection at this time.    At this point, we performed irrigation of the pelvis and upper abdomen with 4 liters of warm saline. We performed cystoscopy with findings as above.  There was noted to be some raw surfaces along the left pelvic sidewall where the mass had been adherent and thus SurgiCell was placed in this area.  All laparotomy sponges were next removed. Fascia was closed with 0 looped PDS in  2 segments meeting in the middle. Subcutaneous tissue was reapproximated and skin closed with two running subcuticular sutures meeting in the middle.  Dermabond was applied to the incision as well as the two port sites.    Sponge, lap, instrument and needle counts were reported as correct x2. The patient was then repositioned appropriately, recalled from the general anesthetic and was transferred to recovery unit in stable condition.       Radha Gonzalez MD  Gynecologic Oncology  [facility]

## 2023-10-05 NOTE — PLAN OF CARE
Goal Outcome Evaluation:    Patient is A&O X4, VSS on RA. POD 1. C/o pain managed with Tylenol, Lidocain patch, Oxycodone. 2 lap sites, midine incision CDI, CHAYA. Abdominal binder on. PIV SL. Tolerating regular diet, due to get up. Pearson catheter in place, patent, cleaned with  adequate output orange color. Continue with plan of care.

## 2023-10-05 NOTE — PROGRESS NOTES
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 HPV cervical    Endometrial polyp    Complex cyst of left ovary    S/P total abdominal hysterectomy       Procedures: Diagnostic Laparoscopy, Exploratory laparotomy, Modified Radiacal hysterectomy, left salpingo-oophorectomy, right salpingectomy, lysis of adhesions for > 1 hour, cystoscopy    Prior to Admission Medications:  Medications Prior to Admission   Medication Sig Dispense Refill Last Dose    magnesium 250 MG tablet    Past Week    Naproxen Sodium (ALEVE PO)    Past Week    Vitamin D, Cholecalciferol, 10 MCG (400 UNIT) TABS    Past Week    [DISCONTINUED] acetaminophen (TYLENOL) 500 MG tablet Take 500-1,000 mg by mouth every 6 hours as needed for mild pain   Past Week       Discharge Medications:     Review of your medicines         Important    This medication list is not yet final, because your doctor or pharmacist is still double-checking some of the changes. Ask your nurse for an updated version.  Specifically ask about this and similar medications: oxyCODONE (ROXICODONE) 5 MG tablet       START taking        Dose / Directions   hydrOXYzine 25 MG tablet  Commonly known as: ATARAX      Dose: 25 mg  Take 1 tablet (25 mg) by mouth every 6 hours as needed for itching  Quantity: 15 tablet  Refills: 0     ibuprofen 600 MG tablet  Commonly known as: ADVIL/MOTRIN      Dose: 600 mg  Take 1 tablet (600 mg) by mouth every 6 hours as needed for inflammatory pain  Quantity: 60 tablet  Refills: 0     methocarbamol 500 MG tablet  Commonly known as: ROBAXIN      Dose: 500 mg  Take 1 tablet (500 mg) by mouth every 6 hours  Quantity:  30 tablet  Refills: 0     ondansetron 4 MG ODT tab  Commonly known as: ZOFRAN ODT      Dose: 4 mg  Take 1 tablet (4 mg) by mouth every 6 hours as needed for nausea or vomiting  Quantity: 20 tablet  Refills: 0     oxyCODONE 5 MG tablet  Commonly known as: ROXICODONE      Dose: 5 mg  Take 1 tablet (5 mg) by mouth every 6 hours as needed for breakthrough pain or moderate to severe pain  Quantity: 12 tablet  Refills: 0     senna-docusate 8.6-50 MG tablet  Commonly known as: SENOKOT-S/PERICOLACE      Dose: 1 tablet  Take 1 tablet by mouth 2 times daily as needed for constipation  Quantity: 60 tablet  Refills: 0            CONTINUE these medicines which may have CHANGED, or have new prescriptions. If we are uncertain of the size of tablets/capsules you have at home, strength may be listed as something that might have changed.        Dose / Directions   acetaminophen 325 MG tablet  Commonly known as: TYLENOL  This may have changed:   medication strength  how much to take  when to take this  reasons to take this      Dose: 650 mg  Take 2 tablets (650 mg) by mouth every 6 hours  Quantity: 100 tablet  Refills: 0            CONTINUE these medicines which have NOT CHANGED        Dose / Directions   ALEVE PO      Refills: 0     magnesium 250 MG tablet      Refills: 0     Vitamin D (Cholecalciferol) 10 MCG (400 UNIT) Tabs      Refills: 0               Where to get your medicines        These medications [place] [facility] [place] [address] [address]  [address] [address] [address]      Phone: [phone]   acetaminophen 325 MG tablet  hydrOXYzine 25 MG tablet  ibuprofen 600 MG tablet  methocarbamol 500 MG tablet  ondansetron 4 MG ODT tab  senna-docusate 8.6-50 MG tablet       Information about where to get these medications is not yet available    Ask your nurse or doctor about these medications  oxyCODONE 5 MG tablet       Consultations:   Psychiatry   Social Work    Brief History  of Illness:  Shayy was admitted follow open abdominal surgery following a DARA, LSO, RS, lysis of adhesions and cysto.     Hospital Course:  Dz:   - Preoperative diagnosis was Ovarian Mass.  Frozen section at the time of the surgery showed endometriosis.  Final pathology is pending at the time of discharge.  She will follow-up postoperatively for a care plan.  FEN:   - She was maintained on IVF until POD#1, when her diet was slowly advanced.  By discharge, she was tolerating a regular diet without nausea and vomiting and able to maintain her hydration without IVF supplementation.  Pain:   - Her pain was initially controlled on a dilaudid PCA.  Once tolerating PO pain meds, she was transitioned to a PO pain regimen.  Her pain was well controlled on this and she was discharged home with these medications.  CV:   - She has no history of CV issues.  Her vital signs were stable while in house and she had no acute CV issues.  PULM:   - She has no history of pulmonary issues.  She was initially given O2 supplementation in to maintain her O2 sats in the immediate postop period and was transitioned off of this without difficulty.  By discharge, her O2 sats were greater than 94% on RA.  She was encouraged to use her bedside IS while in house.  She had no acute pulmonary issues while in house.  HEME:   - Her preoperative Hgb was 12.  Her hgb dropped to 10.5 postoperatively and was stable at  at the time of discharge.  She had no other acute heme issues while in house.  GI:   - She was made NPO prior to the procedure.  On POD#0, her diet was advanced to clear liquids and then advanced slowly as tolerated.  At the time of discharge, she was tolerating a regular diet without nausea and vomiting.  She will be discharged with a bowel regimen to prevent constipation in the postoperative period.  She had no acute GI issues while in house.  :    - A lang catheter was placed at the time of the surgery.  Once ambulating unassisted,  the lang catheter was removed.  Prior to discharge, the patient was voiding spontaneously without difficulty.  She had no acute  issues while in house.  ID:   - The patient was AF during her hospitalization.  She received standard preoperative antibiotics without incident.    ENDO:   - no issues  PSYCH/NEURO:   - Endorsed SI on POD#0. This was in the setting of patient processing the events of surgery. She met with psychiatry. Had no more suicidal ideation, completed safety contracts and is planning to follow up with a therapist in the outpatient setting.  PPX:    -  She was given SCDs, IS, PPI and preoperative heparin during her hospital course.  She tolerated these prophylactic interventions without incident.  They were discontinued at the time of her discharge.      Discharge Instructions and Follow up:  Ms. Shayy Ndiaye was discharged from the hospital with follow up for post operative management on 10/24/23.    Discharge Diet: Regular  Discharge Activity: No lifting, driving, or strenuous exercise for 8 week(s)  No heavy lifting, pushing, pulling for 8 week(s)  Discharge Follow up: 10/24/23 with Dr. Gonzalez    Discharge Disposition:  Discharged to home    Discharge Staff: MD Socrates Rivera MD, MPH  Gynecologic Oncology Resident PGY-3  10/6/2023 8:24 AM    GYN Oncology Contact Info:  [phone] weekdays: [place] resident pager [phone]   [phone] & weekends: Off site cross cover resident [phone]   Please include 10-digit call back number as cross cover resident is off site at [facility].

## 2023-10-05 NOTE — PLAN OF CARE
"Goal Outcome Evaluation:    SUMMARY: Total abdominal hysterectomy on 10/4    DATE & TIME: 10/4/2023 [id number]    Cognitive Concerns/ Orientation : A&O x 4   BEHAVIOR & AGGRESSION TOOL COLOR: green, very anxious - PRN Atarax given x1 - somewhat effective.  ABNL VS/O2: elevated BP, other VSS on RA  MOBILITY: not OOB  PAIN MANAGMENT: pt states pain with deep breathing - adjusted abdominal binder per pt request/comfort. PRN dilaudid 0.2 mg given x2 (effective), Robaxin & Simethicone given x1. Scheduled Toradol.  DIET: regular, tolerating liquids well.  BOWEL/BLADDER: lang catheter in place  ABNL LAB/BG: none  DRAIN/DEVICES: L & R hand PIV - administered meds through them with severe pain per pt. New IV attempted but unsuccessful - Vascular consult placed.  TELEMETRY RHYTHM: NA  SKIN: abdominal incision, laparoscopy sites - No drainage. Pt refused posterior skin assessment d/t pain educated provided.  TESTS/PROCEDURES: none new  D/C DATE: 2 days  Discharge Barriers: mental health, pain management  OTHER IMPORTANT INFO: Pt verbally shared that she has suicidal ideations and feelings of hopelessness. Stated \"I do not want to be here\". Stated \"I do not want my family to know\". She shared that she does not have a plan to harm herself.  consult placed. ask daytime MD for Psych consult & SW consult - pt said she is open to speaking with these. Paged MD (per sticky note) for pt requesting lidocaine patches for abdomen - waiting to hear back.                             "

## 2023-10-05 NOTE — CONSULTS
SPIRITUAL HEALTH SERVICES - Progress Note   SH Gen Surg    Referral Source: Consult   Made multiple attempts to see pt. Shayy Reavesndt today but she was unavailable each time. Will check in with Shayy the morning of 10/6.    Plan: Spiritual Health will follow up and remain available.      Roxanne Hinkle  Chaplain Resident    [facility] routine referrals *[phone]    [facility] available 24/7 for emergent requests/referrals, either by paging the on-call  or by entering an ASAP/STAT consult in Epic (this will also page the on-call ).

## 2023-10-06 VITALS
TEMPERATURE: 97.7 F | WEIGHT: 201.9 LBS | SYSTOLIC BLOOD PRESSURE: 161 MMHG | HEART RATE: 68 BPM | OXYGEN SATURATION: 98 % | DIASTOLIC BLOOD PRESSURE: 84 MMHG | HEIGHT: 68 IN | RESPIRATION RATE: 16 BRPM | BODY MASS INDEX: 30.6 KG/M2

## 2023-10-06 LAB
PATH REPORT.COMMENTS IMP SPEC: NORMAL
PATH REPORT.FINAL DX SPEC: NORMAL
PATH REPORT.FINAL DX SPEC: NORMAL
PATH REPORT.GROSS SPEC: NORMAL
PATH REPORT.GROSS SPEC: NORMAL
PATH REPORT.INTRAOP OBS SPEC DOC: NORMAL
PATH REPORT.MICROSCOPIC SPEC OTHER STN: NORMAL
PATH REPORT.MICROSCOPIC SPEC OTHER STN: NORMAL
PATH REPORT.RELEVANT HX SPEC: NORMAL
PATH REPORT.RELEVANT HX SPEC: NORMAL
PHOTO IMAGE: NORMAL

## 2023-10-06 PROCEDURE — 250N000013 HC RX MED GY IP 250 OP 250 PS 637: Performed by: OBSTETRICS & GYNECOLOGY

## 2023-10-06 PROCEDURE — 250N000013 HC RX MED GY IP 250 OP 250 PS 637

## 2023-10-06 PROCEDURE — 99024 POSTOP FOLLOW-UP VISIT: CPT | Performed by: OBSTETRICS & GYNECOLOGY

## 2023-10-06 RX ORDER — ONDANSETRON 4 MG/1
4 TABLET, ORALLY DISINTEGRATING ORAL EVERY 6 HOURS PRN
Qty: 20 TABLET | Refills: 0 | Status: SHIPPED | OUTPATIENT
Start: 2023-10-06 | End: 2023-10-24

## 2023-10-06 RX ORDER — IBUPROFEN 600 MG/1
600 TABLET, FILM COATED ORAL EVERY 6 HOURS PRN
Qty: 60 TABLET | Refills: 0 | Status: SHIPPED | OUTPATIENT
Start: 2023-10-06 | End: 2024-02-05

## 2023-10-06 RX ORDER — AMOXICILLIN 250 MG
1 CAPSULE ORAL 2 TIMES DAILY PRN
Qty: 60 TABLET | Refills: 0 | Status: SHIPPED | OUTPATIENT
Start: 2023-10-06 | End: 2024-08-05

## 2023-10-06 RX ORDER — OXYCODONE HYDROCHLORIDE 5 MG/1
5 TABLET ORAL EVERY 6 HOURS PRN
Qty: 12 TABLET | Refills: 0 | Status: SHIPPED | OUTPATIENT
Start: 2023-10-06 | End: 2023-10-13

## 2023-10-06 RX ORDER — HYDROXYZINE HYDROCHLORIDE 50 MG/ML
25 INJECTION, SOLUTION INTRAMUSCULAR EVERY 6 HOURS PRN
Status: DISCONTINUED | OUTPATIENT
Start: 2023-10-06 | End: 2023-10-06

## 2023-10-06 RX ORDER — METHOCARBAMOL 500 MG/1
500 TABLET, FILM COATED ORAL EVERY 6 HOURS
Qty: 30 TABLET | Refills: 0 | Status: SHIPPED | OUTPATIENT
Start: 2023-10-06 | End: 2023-10-13

## 2023-10-06 RX ORDER — HYDROXYZINE HYDROCHLORIDE 50 MG/ML
25 INJECTION, SOLUTION INTRAMUSCULAR EVERY 6 HOURS PRN
Status: DISCONTINUED | OUTPATIENT
Start: 2023-10-06 | End: 2023-10-06 | Stop reason: HOSPADM

## 2023-10-06 RX ORDER — HYDROXYZINE HYDROCHLORIDE 25 MG/1
25 TABLET, FILM COATED ORAL EVERY 6 HOURS PRN
Qty: 15 TABLET | Refills: 0 | Status: SHIPPED | OUTPATIENT
Start: 2023-10-06 | End: 2023-10-24 | Stop reason: SINTOL

## 2023-10-06 RX ORDER — ACETAMINOPHEN 325 MG/1
650 TABLET ORAL EVERY 6 HOURS
Qty: 100 TABLET | Refills: 0 | Status: SHIPPED | OUTPATIENT
Start: 2023-10-06 | End: 2024-02-05

## 2023-10-06 RX ADMIN — SENNOSIDES AND DOCUSATE SODIUM 1 TABLET: 50; 8.6 TABLET ORAL at 08:13

## 2023-10-06 RX ADMIN — FAMOTIDINE 20 MG: 20 TABLET ORAL at 08:13

## 2023-10-06 RX ADMIN — OXYCODONE HYDROCHLORIDE 10 MG: 5 TABLET ORAL at 04:24

## 2023-10-06 RX ADMIN — METHOCARBAMOL 500 MG: 500 TABLET ORAL at 01:40

## 2023-10-06 RX ADMIN — METHOCARBAMOL 500 MG: 500 TABLET ORAL at 05:48

## 2023-10-06 RX ADMIN — IBUPROFEN 600 MG: 600 TABLET ORAL at 01:40

## 2023-10-06 RX ADMIN — OXYCODONE HYDROCHLORIDE 10 MG: 5 TABLET ORAL at 07:25

## 2023-10-06 RX ADMIN — ACETAMINOPHEN 650 MG: 325 TABLET, FILM COATED ORAL at 05:49

## 2023-10-06 RX ADMIN — IBUPROFEN 600 MG: 600 TABLET ORAL at 07:25

## 2023-10-06 RX ADMIN — SIMETHICONE 80 MG: 80 TABLET, CHEWABLE ORAL at 07:26

## 2023-10-06 ASSESSMENT — ACTIVITIES OF DAILY LIVING (ADL)
ADLS_ACUITY_SCORE: 21

## 2023-10-06 NOTE — CONSULTS
Triage and Transition - Consult and Liaison     Shayy Ndiaye  October 6, 2023    Psychiatry consult acknowledged. Consult unable to be completed due to patient discharging.  Writer left voicemail with patient offering to schedule follow up therapy and/or psychiatry appointments and left C&L phone number. Please re-consult if needed.    [facility], Psychotherapist Trainee  Triage and Transition - Consult and Liaison   [phone]

## 2023-10-06 NOTE — PROGRESS NOTES
Pt is alert and oriented, tolerates diet, continues to have pain, abd binder in place, pt up independently. Voiding well, passing flatus, will discharge to home. Pt given written and verbal discharge instructions, pt knows that follow up care is needed and who to call with any questions or concerns. All medications were reviewed and pt is ready for discharge.

## 2023-10-06 NOTE — DISCHARGE SUMMARY
Gynecologic Oncology Discharge Summary     Shayy Ndiaye  [id number]     Admit Date: 10/4/2023  Discharge Date: 10/6/23  Admitting Provider: Radha Gonzalez MD  Discharge Provider: Radha Gonzalez MD     Admission Dx:   - Endometriosis  - Ovarian Mass     Discharge Dx:  - same as above s/p procedure below         Patient Active Problem List   Diagnosis    Endometriosis    ASCUS with positive high risk HPV cervical    Endometrial polyp    Complex cyst of left ovary    S/P total abdominal hysterectomy         Procedures: Diagnostic Laparoscopy, Exploratory laparotomy, Modified Radiacal hysterectomy, left salpingo-oophorectomy, right salpingectomy, lysis of adhesions for > 1 hour, cystoscopy     Prior to Admission Medications:  Prescriptions Prior to Admission           Medications Prior to Admission   Medication Sig Dispense Refill Last Dose    magnesium 250 MG tablet       Past Week    Naproxen Sodium (ALEVE PO)       Past Week    Vitamin D, Cholecalciferol, 10 MCG (400 UNIT) TABS       Past Week    [DISCONTINUED] acetaminophen (TYLENOL) 500 MG tablet Take 500-1,000 mg by mouth every 6 hours as needed for mild pain     Past Week            Discharge Medications:      Review of your medicines           Important    This medication list is not yet final, because your doctor or pharmacist is still double-checking some of the changes. Ask your nurse for an updated version.  Specifically ask about this and similar medications: oxyCODONE (ROXICODONE) 5 MG tablet         START taking         Dose / Directions   hydrOXYzine 25 MG tablet  Commonly known as: ATARAX       Dose: 25 mg  Take 1 tablet (25 mg) by mouth every 6 hours as needed for itching  Quantity: 15 tablet  Refills: 0      ibuprofen 600 MG tablet  Commonly known as: ADVIL/MOTRIN       Dose: 600 mg  Take 1 tablet (600 mg) by mouth every 6 hours as needed for inflammatory pain  Quantity: 60 tablet  Refills: 0      methocarbamol 500 MG tablet  Commonly known as:  ROBAXIN       Dose: 500 mg  Take 1 tablet (500 mg) by mouth every 6 hours  Quantity: 30 tablet  Refills: 0      ondansetron 4 MG ODT tab  Commonly known as: ZOFRAN ODT       Dose: 4 mg  Take 1 tablet (4 mg) by mouth every 6 hours as needed for nausea or vomiting  Quantity: 20 tablet  Refills: 0      oxyCODONE 5 MG tablet  Commonly known as: ROXICODONE       Dose: 5 mg  Take 1 tablet (5 mg) by mouth every 6 hours as needed for breakthrough pain or moderate to severe pain  Quantity: 12 tablet  Refills: 0      senna-docusate 8.6-50 MG tablet  Commonly known as: SENOKOT-S/PERICOLACE       Dose: 1 tablet  Take 1 tablet by mouth 2 times daily as needed for constipation  Quantity: 60 tablet  Refills: 0                CONTINUE these medicines which may have CHANGED, or have new prescriptions. If we are uncertain of the size of tablets/capsules you have at home, strength may be listed as something that might have changed.         Dose / Directions   acetaminophen 325 MG tablet  Commonly known as: TYLENOL  This may have changed:   medication strength  how much to take  when to take this  reasons to take this       Dose: 650 mg  Take 2 tablets (650 mg) by mouth every 6 hours  Quantity: 100 tablet  Refills: 0                CONTINUE these medicines which have NOT CHANGED         Dose / Directions   ALEVE PO       Refills: 0      magnesium 250 MG tablet       Refills: 0      Vitamin D (Cholecalciferol) 10 MCG (400 UNIT) Tabs       Refills: 0                    Where to get your medicines          These medications were sent to [facility] [place], [place] - [address] [address]  [address] [address] [address]        Phone: [phone]   acetaminophen 325 MG tablet  hydrOXYzine 25 MG tablet  ibuprofen 600 MG tablet  methocarbamol 500 MG tablet  ondansetron 4 MG ODT tab  senna-docusate 8.6-50 MG tablet         Information about where to get these medications is not yet available    Ask your nurse or  doctor about these medications  oxyCODONE 5 MG tablet         Consultations:   Psychiatry   Social Work     Brief History of Illness:  Shayy was admitted follow open abdominal surgery following a DARA, LSO, RS, lysis of adhesions and cysto.      Hospital Course:  Dz:   - Preoperative diagnosis was Ovarian Mass.  Frozen section at the time of the surgery showed endometriosis.  Final pathology is pending at the time of discharge.  She will follow-up postoperatively for a care plan.  FEN:   - She was maintained on IVF until POD#1, when her diet was slowly advanced.  By discharge, she was tolerating a regular diet without nausea and vomiting and able to maintain her hydration without IVF supplementation.  Pain:   - Her pain was initially controlled on a dilaudid PCA.  Once tolerating PO pain meds, she was transitioned to a PO pain regimen.  Her pain was well controlled on this and she was discharged home with these medications.  CV:   - She has no history of CV issues.  Her vital signs were stable while in house and she had no acute CV issues.  PULM:   - She has no history of pulmonary issues.  She was initially given O2 supplementation in to maintain her O2 sats in the immediate postop period and was transitioned off of this without difficulty.  By discharge, her O2 sats were greater than 94% on RA.  She was encouraged to use her bedside IS while in house.  She had no acute pulmonary issues while in house.  HEME:   - Her preoperative Hgb was 12.  Her hgb dropped to 10.5 postoperatively and was stable at  at the time of discharge.  She had no other acute heme issues while in house.  GI:   - She was made NPO prior to the procedure.  On POD#0, her diet was advanced to clear liquids and then advanced slowly as tolerated.  At the time of discharge, she was tolerating a regular diet without nausea and vomiting.  She will be discharged with a bowel regimen to prevent constipation in the postoperative period.  She had no acute  GI issues while in house.  :    - A lang catheter was placed at the time of the surgery.  Once ambulating unassisted, the lang catheter was removed.  Prior to discharge, the patient was voiding spontaneously without difficulty.  She had no acute  issues while in house.  ID:   - The patient was AF during her hospitalization.  She received standard preoperative antibiotics without incident.    ENDO:   - no issues  PSYCH/NEURO:   - Endorsed SI on POD#0. This was in the setting of patient processing the events of surgery. She met with psychiatry. Had no more suicidal ideation, completed safety contracts and is planning to follow up with a therapist in the outpatient setting.  PPX:    -  She was given SCDs, IS, PPI and preoperative heparin during her hospital course.  She tolerated these prophylactic interventions without incident.  They were discontinued at the time of her discharge.        Discharge Instructions and Follow up:  Ms. Shayy Ndiaye was discharged from the hospital with follow up for post operative management on 10/24/23.     Discharge Diet: Regular  Discharge Activity: No lifting, driving, or strenuous exercise for 8 week(s)  No heavy lifting, pushing, pulling for 8 week(s)  Discharge Follow up: 10/24/23 with Dr. Gonzalez     Discharge Disposition:  Discharged to home     Discharge Staff: MD Socrates Rivera MD, MPH  Gynecologic Oncology Resident PGY-3  10/6/2023 8:24 AM     GYN Oncology Contact Info:  [phone] weekdays: [facility] pager [phone]   [phone] & weekends: Off site cross cover resident [phone]   Please include 10-digit call back number as cross cover resident is off site at [facility].         Radha Gonzalez MD  Gynecologic Oncology  [facility]

## 2023-10-06 NOTE — PROGRESS NOTES
Gynecology Oncology Progress Note  October 6, 2023    Ms. Shayy Ndiaye is a 43 year old POD# 2 s/p Dx Lsc > DARA, LSO, RS    Dz: Endometriosis    Subjective: Pain well controlled. Ambulating without dizziness. Tolerating PO without nausea or vomiting. Not passing flatus. Denies fevers, chills, chest pain, SOB. No other questions or concerns.    Objective:   Patient Vitals for the past 24 hrs:   BP Temp Temp src Pulse Resp SpO2   10/05/23 2317 117/80 98.7  F (37.1  C) Oral 92 16 94 %   10/05/23 1955 118/68 98.6  F (37  C) Oral 98 16 94 %   10/05/23 1529 112/76 99.2  F (37.3  C) Oral 96 16 93 %   10/05/23 0807 122/70 98.4  F (36.9  C) Oral 95 16 96 %       General: in NAD  CV: Warm, well perfused  Resp: sating well on room air, no increased work of breathing  Abdomen: soft, minimally tender, non distended  Incision: dermabond in place, c/d/I  Extremities: nontender, trace edema  Lines/Drains: PIV, Pearson    Intake/Output Summary (Last 24 hours) at 10/6/2023 0655  Last data filed at 10/5/2023 1400  Gross per 24 hour   Intake 720 ml   Output 1500 ml   Net -780 ml      Latest Reference Range & Units 10/05/23 06:40   Sodium 135 - 145 mmol/L 137   Potassium 3.4 - 5.3 mmol/L 3.9   Chloride 98 - 107 mmol/L 102   Carbon Dioxide (CO2) 22 - 29 mmol/L 22   Urea Nitrogen 6.0 - 20.0 mg/dL 9.4   Creatinine 0.51 - 0.95 mg/dL 0.66   GFR Estimate >60 mL/min/1.73m2 >90   Calcium 8.6 - 10.0 mg/dL 8.6   Anion Gap 7 - 15 mmol/L 13   Albumin 3.5 - 5.2 g/dL 3.6   Protein Total 6.4 - 8.3 g/dL 6.1 (L)   Alkaline Phosphatase 35 - 104 U/L 70   ALT 0 - 50 U/L 31   AST 0 - 45 U/L 27   Bilirubin Total <=1.2 mg/dL 0.6   Glucose 70 - 99 mg/dL 131 (H)   WBC 4.0 - 11.0 10e3/uL 10.5   Hemoglobin 11.7 - 15.7 g/dL 10.5 (L)   Hematocrit 35.0 - 47.0 % 32.2 (L)   Platelet Count 150 - 450 10e3/uL 321   RBC Count 3.80 - 5.20 10e6/uL 3.60 (L)   MCV 78 - 100 fL 89   MCH 26.5 - 33.0 pg 29.2   MCHC 31.5 - 36.5 g/dL 32.6   RDW 10.0 - 15.0 % 12.9     Assessment:  43year old POD#2 s/p Dx Lsc > DARA, LSO, BS, Christiana. Doing well in the immediate postoperative period. VSS.     Post-operative State  Endometriosis  Pathology on frozen was suggestive of Endometriosis. Given complex nature of the pelvic mass the left fallopian tube and ovary were inseparable from posterior uterus. Shayy is meeting goals for discharge and likely can discharge later this PM  - Pain: Tylenol, Ibuprofen, robaxin and PRN opiates  - Regular Diet  - Follow up with Dr. Gonzalez on 10/24/23.    Anxiety  Improved with hydroxyzine. Will discharge with some hydroxyzine.    Passive SI  Shayy is no longer having suicidal ideation. Psychiatry visited with the patient and they have contracts for safety. Plan for LM to see patient today, 10/6 to help arrange outpatient follow up  - follow up psych and social work consults today      Disposition: Pending postoperative goals      Socrates Cordova MD, MPH  Gynecologic Oncology Resident PGY-3  10/6/2023 6:54 AM    GYN Oncology Contact Info:  [phone] weekdays: [facility] resident pager [phone]   [phone] & weekends: Off site cross cover resident [phone]   Please include 10-digit call back number as cross cover resident is off site at [facility].       I, Leodan Gonzalez MD personally examined and evaluated this patient on 10/06/23.  I discussed the patient with the resident and care team, and agree with the assessment and plan of care as documented in the residents note above.    I personally reviewed vital signs, laboratory values and imaging results.    Pt doing well and meeting post-operative goals.  Surgical findings reviewed.  Home today.    Radha Gonzalez MD  Gynecologic Oncology  [facility]

## 2023-10-06 NOTE — CONSULTS
"SPIRITUAL HEALTH SERVICES - Consult Note    Gen Surg  Saw pt Shayy Ndiaye per consult request.     Patient/Family Understanding of Illness and Goals of Care -   Shayy has struggled with her health for many years and explains that \"things have gone so fast\" over the last week or so and she's \"still trying to catch up\" emotionally with her recent surgery and all the pain and life changes it brought about.     Distress and Loss -   Shayy expresses \"grief\" over losses including her health, the loss of reproductive organs, and past losses due to her on-going medical issues.    Strengths, Coping, and Resources -   Shayy has relationships that support her including a partner, parents, friends and online community.  Shayy describes herself as an \"avid thinker\" and someone who can express herself with words easily.    Meaning, Beliefs, and Spirituality -   While Shayy does not prescribe to a particular doctrine of Baptism, she appreciates the anai extended by believers and expressed interest in continuing to explore colton.     Plan of Care - Spiritual Health remains available.       Roxanne Hinkle  Chaplain Resident    [facility] routine referrals *[phone]    [facility] available 24/7 for emergent requests/referrals, either by paging the on-call  or by entering an ASAP/STAT consult in Epic (this will also page the on-call ).   "

## 2023-10-06 NOTE — PROGRESS NOTES
Writer received call from Lyndsey Cooper at [facility] explaining that she received the re-fax of Yuliet's [place] paperwork, but part C is still not completed. Neftali reports that there's 3 check boxes in part C and 1 of those boxes needs to be checked. She also needs more information about the essential job functions that Yuliet is unable to perform. Lastly, Lyndsey reports that she's received two copies of the paperwork with conflicting dates of absence listed.     Writer will route to Dr. Gonzalez's RNCC for clarification and completion.    Zoe Restrepo, RN, BSN, OCN  Nurse Care Coordinator  [facility]  P: [phone]     F: [phone]

## 2023-10-06 NOTE — PLAN OF CARE
"Goal Outcome Evaluation:  SUMMARY: POD x2 Total abdominal hysterectomy. Surgery on 10/4  Hx of endometriosis   DATE & TIME: 10/5/2023 night shift [id number]  Cognitive Concerns/ Orientation : A&O x 4, emotional and tearful overnight.   BEHAVIOR & AGGRESSION TOOL COLOR: green  ABNL VS/O2: tachy, VSS on RA  MOBILITY: IND in room. Steady gait  PAIN MANAGMENT: abdominal binder per pt request/comfort. Scheduled tylenol and robaxin,lidocaine patch to the L and R lower abd, Prn ibuprofen and oxy  DIET: regular  BOWEL/BLADDER: voiding well  ABNL LAB/BG: none  DRAIN/DEVICES:  R PIV SL  TELEMETRY RHYTHM: NA  SKIN: abdominal incision, 2 laparoscopy sites with surgical glue - No drainage, clean dry and intact  TESTS/PROCEDURES: none new  D/C DATE:Probable discharge to home today. Family at bedside at night.   - Psych sighed off, pt c/o no dizziness, tolerating reg diet without N/V, No passing gas yet but reported burped couple times. +BS . Denies any SOB.  Using IS.   -pt was axious and tearful this morning and worried about how to managed the pain. Pt stated \" I dont want to do this anymore, I dont want to live like this\", \" I dont feel safe and secure here\".  Writer provided emotion support. Pt refused to take Atarax due to feel dizziness.                        "

## 2023-10-09 ENCOUNTER — PATIENT OUTREACH (OUTPATIENT)
Dept: CARE COORDINATION | Facility: CLINIC | Age: 43
End: 2023-10-09
Payer: COMMERCIAL

## 2023-10-09 ENCOUNTER — PATIENT OUTREACH (OUTPATIENT)
Dept: ONCOLOGY | Facility: CLINIC | Age: 43
End: 2023-10-09
Payer: COMMERCIAL

## 2023-10-09 NOTE — PROGRESS NOTES
Social Work - Intervention  [facility]  Data/Intervention:    Patient Name: Shayy Ndiaye Goes By: Shayy    /Age: 1980 (43 year old)     Visit Type: telephone  Referral Source: RNCC Kim  Reason for Referral: Emotional Health & Coping     Psychosocial Information/Concerns:  Provided safe place for Shayy to voice distress that has surfaced with recent hospitalization, surgery, and anticipating difficulty over the next 2 months. Shayy receptive to connecting with LGBTQ+ informed therapist who has background in medical trauma & grief. Discussed resources and made plan for [employer] message with additional provider names.      Intervention/Education/Resources Provided:  Psychology Today  [facility]   [facility]  Onc SW contact information     Assessment/Plan:  This clinician will plan for psychosocial outreach in 1 week. Shayy knows to outreach to this clinician in the meantime with any psychosocial concerns or needs.      Provided patient/family with contact information and availability.    Vivien Griffith, ANAY, LICSW, OSW-C  Clinical - Adult Oncology  She/Her/Hers  Phone: [phone]  [facility]: M, Thu  *every other Tue, 8am-4:30pm  [facility]: W, F, *every other Tue, 8am-4:30pm

## 2023-10-10 NOTE — PROGRESS NOTES
"Patient called to review several questions. S/P Diagnostic Laparoscopy, exploratory laparotomy, modicfied radical hysterectomy, LSO, right salpingectomy, lysis of adhesions, cystoscopy with Dr Gonzalez on 10/5/23.  Patient states overall she was doing well until yesterday when she was laying in bed and heard a \"popping sensation on right lower abdomen\" just right of her midline incision. Patient denies turning suddenly in bed. Pain is about the same as yesterday and describes as moderate pain about 7-8/pain scale and more of a \"focused ache\" in that area. Patient denies n/v, fevers, bruising, drainage or s/s infection at surgical site. Patient also reports discomfort when changing positions and informed this is expected post surgery, it has not been one week post surgery but discomfort should start to get better each day. Eating and drinking without difficulty.  Patient alternating between tylenol 650 mg and ibuprofen 600 mg as prescribed and also taking Robaxin although patient is not certain that this is helping. Patient instructed she can increase tylenol if needed to 3 tabs or 975 mg every 8 hours. Patient has only taken oxycodone 2 tabs since discharge on 10/6/23 and her last oxycodone was on Saturday. Patient using abdominal binder for comfort. Normal bladder and bowel function and BM noted today.     Patient states she was prescribed atarax for anxiety and restarted when discharged but it is causing increased lightheadedness. Patient states it also helps her to sleep but does not like the way it makes her feel most of the morning after taking at night and unclear if it helps her anxiety. Patient reported suicidal ideation while hospitalized although patient states she does not feel she will harm herself today but is still having a hard time processing her surgical procedure. Patient crying during the call while this is discussed and states she was suppose to meet with another mental health provider prior to " leaving the hospital to help coordinate outside appointment but patient wanted to go home. Patient states she is not home alone, her partner is with her and feels supported. Patient states she thought she would continue her recovery at home and then she can put herself on a schedule to take tylenol and ibuprofen ATC. Patient states she received a call on Saturday from the [facility] to schedule a follow up but informed it would be several months to get in with Boni and offered an appointment on 10/11 in [place] at [facility]. Patient does not have a PCP and discussed locations with [facility] and patient provided with contact information for Emmy Redd and patient will contact them for an appointment.     Dr Gonzalez notified and updated with call. MD recommends patient discontinue atarax if causing side effects. Continue to take robaxin at least for the next several days to avoid many medication changes at once.Most likely post surgical discomfort and patient may add oxycodone as prescribed for added pain relief. Writer will also have neel Puga SW contact patient and MD aware. Patient called back and instructed as noted. Patient states since she talked to writer, she had bleeding from lower abdominal incision that had soaked a 2x2 gauze pad and her undergarments. Patient was cleaning when writer called and patient instructed to clean area with mild soap and water, pat dry and reapply gauze pad and hold pressure for a few minutes. Patient will continue to monitor bleeding and pain and will call back with worsening symptoms or concerns and patient comfortable with plan.     Patient states she has cancelled her appointment with [facility] as she reviewed the website and would like a more diverse group that addresses LGBTQ. Patient informed that she can call back to intake for [facility], contact her insurance company for covered providers and also provided the name of [facility]. Writer also  notified Vivien, neel SW and she will contact patient as well today.  Support given to patient and encouraged to call back with further concerns and patient verbalized understanding.    Dipika Guevara, RN, BSN, OCN

## 2023-10-10 NOTE — PROGRESS NOTES
Received call from Neftali Cooper from patient's employer regarding FMLA form part C is still incomplete and STD and FMLA forms have different dates for time off from work. Writer faxed again to Neftali completed part C on 10/5/2023 and this completed forms is also scanned into chart. Same return to work dates are also noted on FMLA and STD and updated Neftali.      Writer had Radha Benz, SHAINA refax forms and Neftali aware and will call back if needed.    Dipika Guevara RN, BSN, OCN

## 2023-10-13 ENCOUNTER — MYC REFILL (OUTPATIENT)
Dept: OBGYN | Facility: CLINIC | Age: 43
End: 2023-10-13
Payer: COMMERCIAL

## 2023-10-13 ENCOUNTER — PATIENT OUTREACH (OUTPATIENT)
Dept: ONCOLOGY | Facility: CLINIC | Age: 43
End: 2023-10-13
Payer: COMMERCIAL

## 2023-10-13 DIAGNOSIS — Z90.710 S/P TOTAL ABDOMINAL HYSTERECTOMY: ICD-10-CM

## 2023-10-13 RX ORDER — OXYCODONE HYDROCHLORIDE 5 MG/1
5 TABLET ORAL EVERY 6 HOURS PRN
Qty: 12 TABLET | Refills: 0 | Status: SHIPPED | OUTPATIENT
Start: 2023-10-13 | End: 2023-10-25

## 2023-10-13 RX ORDER — METHOCARBAMOL 500 MG/1
500 TABLET, FILM COATED ORAL EVERY 6 HOURS
Qty: 30 TABLET | Refills: 0 | Status: SHIPPED | OUTPATIENT
Start: 2023-10-13 | End: 2024-02-05

## 2023-10-13 NOTE — PROGRESS NOTES
"[facility]: Cancer Care                             Discussion with Patient:                                                      Refill requests received from patient for Robaxin and oxycodone. Writer called patient to complete pain assessment with her, please see below.        Assessment:                                                      Initial  Patient Reported Pain?: Yes, is currently well-managed  Pain Score: Severe Pain (7) (at night)  Pain Loc: Abdomen  Interfered with your usual ACTIVITY?: Somewhat interferes 5  Interfered with your SLEEP?: Somewhat interferes 5  Affected your MOOD?: Somewhat affects 5  Contributed to your STRESS?: Somewhat Contributes 5  Pain Management Interventions: distraction;pillow support provided;repositioned  RASS (Ji Agitation-Sedation Scale): 0-->alert and calm  Response to Pain Interventions: mobility function improved    Pain is well managed during the day, but is still experiencing breakthrough pain at night, rated 7-8/10. She is utilizing ibuprofen, Tylenol, and PO Benadryl, at night, to assist with sleep. Is looking for further recommendations for pain management at night.     Patient reports she is eating and drinking well with good urine output and regular bowel movements. She indicates that having a bowel movement is not painful, but rather \"gas passing through stomach uncomfortable/painful\". She states she is avoiding foods that will cause gas, but is wondering if any other interventions for gas pain is available/recommended. Patient presents no other complaint today.       Intervention/Education provided during outreach:                                                       Patient to call/[employer] message with updates  Route to provider to further advise  Confirmed patient has clinic and triage numbers    Signature:  Radha Voss RN    ADDENDUM    Following response received from Dr. Gonzalez.     Radha Perez MD  You; Dipika Guevara RN "     Heating packs to the incision can help for pain.       Writer sent patient my chart with above recommendations, per her request.    Radha Voss RN on 10/13/2023 at 12:27 PM

## 2023-10-16 ENCOUNTER — PATIENT OUTREACH (OUTPATIENT)
Dept: CARE COORDINATION | Facility: CLINIC | Age: 43
End: 2023-10-16
Payer: COMMERCIAL

## 2023-10-16 NOTE — PROGRESS NOTES
Social Work - Telephone/MyChart message  [facility]  Data:   Patient Name: Shayy Ndiaye  Goes By: Shayy PERRY/Age: 1980 (43 year old)  Reason for Referral: Planned psychosocial check-in    Intervention: Left voice message for patient on 10/16/2023 .   Plan:  will await patient's return phone call/message and provide assistance at that time.   Vivien Griffith, MSW, LICSW, OSW-C  Clinical - Adult Oncology  She/Her/Hers  Phone: [phone]  [facility]: M, Thu  *every other Tue, 8am-4:30pm  [facility]: W, F, *every other Tue, 8am-4:30pm

## 2023-10-24 ENCOUNTER — ONCOLOGY VISIT (OUTPATIENT)
Dept: ONCOLOGY | Facility: CLINIC | Age: 43
End: 2023-10-24
Attending: OBSTETRICS & GYNECOLOGY
Payer: COMMERCIAL

## 2023-10-24 VITALS
HEIGHT: 68 IN | TEMPERATURE: 98 F | BODY MASS INDEX: 30.77 KG/M2 | SYSTOLIC BLOOD PRESSURE: 140 MMHG | OXYGEN SATURATION: 96 % | WEIGHT: 203 LBS | RESPIRATION RATE: 16 BRPM | HEART RATE: 100 BPM | DIASTOLIC BLOOD PRESSURE: 88 MMHG

## 2023-10-24 DIAGNOSIS — N85.02 EIN (ENDOMETRIAL INTRAEPITHELIAL NEOPLASIA): Primary | ICD-10-CM

## 2023-10-24 DIAGNOSIS — D36.9 ADENOMYOMA: ICD-10-CM

## 2023-10-24 PROCEDURE — 99213 OFFICE O/P EST LOW 20 MIN: CPT | Performed by: OBSTETRICS & GYNECOLOGY

## 2023-10-24 ASSESSMENT — PAIN SCALES - GENERAL: PAINLEVEL: MILD PAIN (3)

## 2023-10-24 NOTE — LETTER
10/24/2023         RE: Shayy Ndiaye  [address]  [place] [facility] [place] [address]        Dear Colleague,    Thank you for referring your patient, Shayy Ndiaye, to the [facility]. Please see a copy of my visit note below.    Consult Notes on Referred Patient        Dr. 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  She is unaccompanied today.  She reports she is recovering as expected from surgery.  No significant concerns as she continues to progress slowly.  She has had some mild drainage from her incision but this is decreasing.    Clinical Course:  She has been noticing significant bowel changes and worsened around May.  She has been having difficulty and pain when passing a bowel movement.  She continues to have bowel movements but just notes a change recently.  No blood in the stool.  She has also had an increase in her pelvic pain from her baseline. Increased urination with incontinence at the end of the stream.  Regular menses without significant changes other than more dysmenorrhea.  She has had a decreased appetite but has not had nausea. She had a pelvic sonogram as described below.     She has tried Lupron for 18 months in the past with terrible side effects.  She has also been on OCP for 5-6 years with significant side effects and thus she stopped.  She has also had unintended weight loss of 15 pounds since , patient attributing to poor appetite due to bowel issues.     23:   = 188    23:  US pelvis:  Uterus: anteverted. Contour is irregular w/ myomata: 1 Left Pedunculated 4.3 x 3.3 x 3.0 cm. Size: 7.0 x 4.2 x 3.3 cm Endometrium: Thickness Total 7.4 mm Findings: uterine fibroid sits against lt ovary Right Ovary: 4.1 x 2.9 x 2.4 cm. Complex cyst 1.9 x 2.0 x 1.6 cm Left Ovary: 6.4 x 3.9 x  2.8 cm. Cluster of cysts 5.6 x 2.7 x 5.3 cm Cul de Sac Free Fluid: No free fluid    9/5/23:  MRI Pelvis:  UTERUS: Anteverted measuring 7.8 x 3.5 x 4.7 cm. ENDOMETRIUM: 3 mm in thickness. The junctional zone is within normal limits. ADNEXA: A heterogeneous left adnexal mass arising from the left ovary measures 7.1 x 6.2 x 4.4 cm. The mass surrounds the adjacent sigmoid colon. The mass has cystic and solid components. The solid components demonstrate heterogeneous enhancement. The cystic components contain fluid/fluid levels and T1 hyperintense material which either represents proteinaceous debris or blood. No normal left ovarian tissue visualized. The right ovary is normal measuring 1.8 x 1.7 x 3.0 cm. ADDITIONAL FINDINGS: No lymphadenopathy. Trace free fluid in the pelvis. No osseous lesion.    9/15/23:  CT C/A/P:  1. Complex left adnexal mass with heterogenous enhancing solid component and associated lobulated multiloculated cystic component, abutting/draping along the the sigmoid colon without intervening fat plane. 2. Indeterminate focal liver lesions, as described including hyperdense lesion in segment 4A and ill marginated hypodense lesion in segment 3; recommend MRI liver with Eovist for further characterization. 3. Few sub-6 mm pulmonary nodules, indeterminate, consider correlation with prior imaging if available and/or attention on follow-up 4. Centimeters sized sclerotic foci in the left iliac bone, consider correlation with prior imaging if available and/or attention on follow-up. 5. Mildly enlarged periportal lymph node. 6. Mild free fluid in the pelvis. No high-grade bowel obstruction.    9/20/23:  Colonoscopy:  The colonoscopy was normal aside from a small polyp. We did have the sense that the sigmoid colon was tortuous, possible related to the external mass   Pathology:  tubular adenoma    10/3/23:  PET/CT:  1. Redemonstration of large heterogeneous complex cystic and solid 7.5 cm left adnexal mass  with intense hypermetabolic activity of the 4.9 cm solid components, likely ovarian malignancy.  2. No additional areas of suspicious FDG uptake. Prominent left pelvic sidewall lymph node with mild uptake indeterminate. No evidence of distant metastasis. 3. Stable scattered solid sub-4 mm non-FDG avid pulmonary nodules. 4. Stable non-FDG avid sclerotic foci within the left iliac bone, favor benign.    10/5/23:  Diagnostic laparoscopy, exploratory laparotomy, modified radical hysterectomy, left salpingo-oophorectomy, right salpingectomy, lysis of adhesions for > 1 hour, cystoscopy    Pathology:  EIN and extrauterine adenomyoma of the left adnexa    Obstetrics and Gynecology History:   (1 prior ETOP)      Past Medical History:  Past Medical History:   Diagnosis Date     Anxiety      Endometriosis      Motion sickness        Past Surgical History:  Past Surgical History:   Procedure Laterality Date     COLONOSCOPY N/A 2023    Procedure: COLONOSCOPY, FLEXIBLE, WITH LESION REMOVAL USING SNARE;  Surgeon: Wei Rice MD;  Location:  GI     D & C      elected AB     DILATION AND CURETTAGE, HYSTEROSCOPY DIAGNOSTIC, COMBINED N/A 10/6/2020    Procedure: HYSTEROSCOPY, DILATATION CURETTAGE;  Surgeon: Mino Urias MD;  Location:  OR     HC TOOTH EXTRACTION W/FORCEP       HYSTERECTOMY, TOTAL, ABDOMINAL, WITH SALPINGO-OOPHORECTOMY, CYSTOSCOPY Bilateral 10/4/2023    Procedure: MODIFIED RAICAL TOTAL ABDOMINAL HYSTERECTOMY, LEFT SALPINGO-OOPHORECTOMY, RIGHT SALPINGECTOMY, CYSTOSCOPY, PELVIC WASHINGS;  Surgeon: Radha Perez MD;  Location:  OR     LAPAROSCOPIC LYSIS ADHESIONS N/A 10/6/2020    Procedure: LYSIS OF ADHESIONS;  Surgeon: Mino Urias MD;  Location:  OR     LAPAROSCOPY DIAGNOSTIC (GYN)  2008    endo     LAPAROSCOPY DIAGNOSTIC (GYN)  2008    endo     LAPAROSCOPY DIAGNOSTIC (GYN) Bilateral 10/4/2023    Procedure: DIAGNOSTIC LAPAROSCOPY;  Surgeon: Lisa Alcocer  "Radha Maguire MD;  Location:  OR     LAPAROTOMY, LYSIS ADHESIONS, COMBINED N/A 10/4/2023    Procedure: EXPLORATORY LAPAROTOMY, LYSIS OF ADHESIONS FOR GREATER THAN AN HOUR;  Surgeon: Radha Perez MD;  Location:  OR     LASER CO2 LAPAROSCOPIC VAPORIZATION ENDOMETRIUM N/A 10/6/2020    Procedure: LAPAROSCOPY WITH CO2 LASER, LASER VAPORIZATION OF ENDOMETRIOSIS;  Surgeon: Mino Urias MD;  Location:  OR     TONSILLECTOMY & ADENOIDECTOMY         Health Maintenance:  Last Pap Smear: No further pap smears s/p hysterectomy  She has not had a history of abnormal Pap smears.    Last Mammogram: 8/4/23              Result: normal      She has not had a history of abnormal mammograms.    Last Colonoscopy: N/A       Social History:  Lives with her partner, feels safe at home.  Works in [employer] works from home.  Enjoys online video games.  Does not have an advanced directive on file and would like her mother Saima, to be her POA.  Full code.    Family History:   The patient's family history is significant for.  Family History   Problem Relation Age of Onset     Hyperlipidemia Mother      Breast Cancer Mother      Diabetes Father      No Known Problems Sister      No Known Problems Brother      No Known Problems Maternal Grandmother      No Known Problems Maternal Grandfather      Bone Cancer Paternal Grandmother      No Known Problems Paternal Grandfather      Breast Cancer Maternal Aunt      Cancer Maternal Uncle         head and neck     Diabetes Paternal Aunt      No Known Problems Other      Deep Vein Thrombosis (DVT) No family hx of    Paternal grandmother: bone cancer at 43      Physical Exam:   BP (!) 140/88 (Cuff Size: Adult Large)   Pulse 100   Temp 98  F (36.7  C) (Tympanic)   Resp 16   Ht 1.727 m (5' 8\")   Wt 92.1 kg (203 lb)   LMP 09/23/2023   SpO2 96%   BMI 30.87 kg/m    Abd:  incision well healing without erythema/induration.  Small amount of drainage zully-umbilically and at the very " inferior margin of the incision where silver nitrate was applied to assist with granulation    Labs:  None      Assessment:    Shayy Ndiaye is a 43 year old woman with a diagnosis of EIN and extrauterine adenomyoma    A total of 20 minutes was spent on patient care today.       Plan:     1.)    Ovarian mass-Reviewed pathology of extrauterine adenomyoma and incidental finding of EIN.  We reviewed her surgical findings and pathology.  Given the benign findings, she no longer needs follow up with the gynecologic oncology clinic.  She also no longer needs screening pap smears.  She should continue to have routine annual exams including a pelvic exam with her primary gynecologist or PCP.  She was instructed to call if any concerns arise.      2.) Genetic risk factors were assessed and the patient does not meet the qualifications for a referral     3.) Labs and/or tests ordered include:  None     4.) Health maintenance issues addressed today include pt is up to date.            Thank you for allowing us to participate in the care of your patient.         Sincerely,    Radha Gonzalez MD  Gynecologic Oncology  [facility]       GELACIO BELTRE      Again, thank you for allowing me to participate in the care of your patient.        Sincerely,        Leodan Gonzalez MD

## 2023-10-24 NOTE — PROGRESS NOTES
Consult Notes on Referred Patient        Dr. 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  She is unaccompanied today.  She reports she is recovering as expected from surgery.  No significant concerns as she continues to progress slowly.  She has had some mild drainage from her incision but this is decreasing.    Clinical Course:  She has been noticing significant bowel changes and worsened around May.  She has been having difficulty and pain when passing a bowel movement.  She continues to have bowel movements but just notes a change recently.  No blood in the stool.  She has also had an increase in her pelvic pain from her baseline. Increased urination with incontinence at the end of the stream.  Regular menses without significant changes other than more dysmenorrhea.  She has had a decreased appetite but has not had nausea. She had a pelvic sonogram as described below.     She has tried Lupron for 18 months in the past with terrible side effects.  She has also been on OCP for 5-6 years with significant side effects and thus she stopped.  She has also had unintended weight loss of 15 pounds since , patient attributing to poor appetite due to bowel issues.     23:   = 188    23:  US pelvis:  Uterus: anteverted. Contour is irregular w/ myomata: 1 Left Pedunculated 4.3 x 3.3 x 3.0 cm. Size: 7.0 x 4.2 x 3.3 cm Endometrium: Thickness Total 7.4 mm Findings: uterine fibroid sits against lt ovary Right Ovary: 4.1 x 2.9 x 2.4 cm. Complex cyst 1.9 x 2.0 x 1.6 cm Left Ovary: 6.4 x 3.9 x 2.8 cm. Cluster of cysts 5.6 x 2.7 x 5.3 cm Cul de Sac Free Fluid: No free fluid    23:  MRI Pelvis:  UTERUS: Anteverted measuring 7.8 x 3.5 x 4.7 cm. ENDOMETRIUM: 3 mm in thickness. The junctional zone is within normal limits. ADNEXA: A heterogeneous left  adnexal mass arising from the left ovary measures 7.1 x 6.2 x 4.4 cm. The mass surrounds the adjacent sigmoid colon. The mass has cystic and solid components. The solid components demonstrate heterogeneous enhancement. The cystic components contain fluid/fluid levels and T1 hyperintense material which either represents proteinaceous debris or blood. No normal left ovarian tissue visualized. The right ovary is normal measuring 1.8 x 1.7 x 3.0 cm. ADDITIONAL FINDINGS: No lymphadenopathy. Trace free fluid in the pelvis. No osseous lesion.    9/15/23:  CT C/A/P:  1. Complex left adnexal mass with heterogenous enhancing solid component and associated lobulated multiloculated cystic component, abutting/draping along the the sigmoid colon without intervening fat plane. 2. Indeterminate focal liver lesions, as described including hyperdense lesion in segment 4A and ill marginated hypodense lesion in segment 3; recommend MRI liver with Eovist for further characterization. 3. Few sub-6 mm pulmonary nodules, indeterminate, consider correlation with prior imaging if available and/or attention on follow-up 4. Centimeters sized sclerotic foci in the left iliac bone, consider correlation with prior imaging if available and/or attention on follow-up. 5. Mildly enlarged periportal lymph node. 6. Mild free fluid in the pelvis. No high-grade bowel obstruction.    9/20/23:  Colonoscopy:  The colonoscopy was normal aside from a small polyp. We did have the sense that the sigmoid colon was tortuous, possible related to the external mass   Pathology:  tubular adenoma    10/3/23:  PET/CT:  1. Redemonstration of large heterogeneous complex cystic and solid 7.5 cm left adnexal mass with intense hypermetabolic activity of the 4.9 cm solid components, likely ovarian malignancy.  2. No additional areas of suspicious FDG uptake. Prominent left pelvic sidewall lymph node with mild uptake indeterminate. No evidence of distant metastasis. 3. Stable  scattered solid sub-4 mm non-FDG avid pulmonary nodules. 4. Stable non-FDG avid sclerotic foci within the left iliac bone, favor benign.    10/5/23:  Diagnostic laparoscopy, exploratory laparotomy, modified radical hysterectomy, left salpingo-oophorectomy, right salpingectomy, lysis of adhesions for > 1 hour, cystoscopy    Pathology:  EIN and extrauterine adenomyoma of the left adnexa    Obstetrics and Gynecology History:   (1 prior ETOP)      Past Medical History:  Past Medical History:   Diagnosis Date    Anxiety     Endometriosis     Motion sickness        Past Surgical History:  Past Surgical History:   Procedure Laterality Date    COLONOSCOPY N/A 2023    Procedure: COLONOSCOPY, FLEXIBLE, WITH LESION REMOVAL USING SNARE;  Surgeon: Wei Rice MD;  Location: [facility]    D & C      elected AB    DILATION AND CURETTAGE, HYSTEROSCOPY DIAGNOSTIC, COMBINED N/A 10/6/2020    Procedure: HYSTEROSCOPY, DILATATION CURETTAGE;  Surgeon: Mino Urias MD;  Location:  OR    HC TOOTH EXTRACTION W/FORCEP      HYSTERECTOMY, TOTAL, ABDOMINAL, WITH SALPINGO-OOPHORECTOMY, CYSTOSCOPY Bilateral 10/4/2023    Procedure: MODIFIED RAICAL TOTAL ABDOMINAL HYSTERECTOMY, LEFT SALPINGO-OOPHORECTOMY, RIGHT SALPINGECTOMY, CYSTOSCOPY, PELVIC WASHINGS;  Surgeon: Radha Perez MD;  Location:  OR    LAPAROSCOPIC LYSIS ADHESIONS N/A 10/6/2020    Procedure: LYSIS OF ADHESIONS;  Surgeon: Mino Urias MD;  Location:  OR    LAPAROSCOPY DIAGNOSTIC (GYN)  2008    endo    LAPAROSCOPY DIAGNOSTIC (GYN)  2008    endo    LAPAROSCOPY DIAGNOSTIC (GYN) Bilateral 10/4/2023    Procedure: DIAGNOSTIC LAPAROSCOPY;  Surgeon: Radha Perez MD;  Location:  OR    LAPAROTOMY, LYSIS ADHESIONS, COMBINED N/A 10/4/2023    Procedure: EXPLORATORY LAPAROTOMY, LYSIS OF ADHESIONS FOR GREATER THAN AN HOUR;  Surgeon: Radha Perez MD;  Location:  OR    LASER CO2 LAPAROSCOPIC VAPORIZATION ENDOMETRIUM  "N/A 10/6/2020    Procedure: LAPAROSCOPY WITH CO2 LASER, LASER VAPORIZATION OF ENDOMETRIOSIS;  Surgeon: Mino Urias MD;  Location:  OR    TONSILLECTOMY & ADENOIDECTOMY         Health Maintenance:  Last Pap Smear: No further pap smears s/p hysterectomy  She has not had a history of abnormal Pap smears.    Last Mammogram: 8/4/23              Result: normal      She has not had a history of abnormal mammograms.    Last Colonoscopy: N/A       Social History:  Lives with her partner, feels safe at home.  Works in [employer] works from home.  Enjoys online video games.  Does not have an advanced directive on file and would like her mother Saima, to be her POA.  Full code.    Family History:   The patient's family history is significant for.  Family History   Problem Relation Age of Onset    Hyperlipidemia Mother     Breast Cancer Mother     Diabetes Father     No Known Problems Sister     No Known Problems Brother     No Known Problems Maternal Grandmother     No Known Problems Maternal Grandfather     Bone Cancer Paternal Grandmother     No Known Problems Paternal Grandfather     Breast Cancer Maternal Aunt     Cancer Maternal Uncle         head and neck    Diabetes Paternal Aunt     No Known Problems Other     Deep Vein Thrombosis (DVT) No family hx of    Paternal grandmother: bone cancer at 43      Physical Exam:   BP (!) 140/88 (Cuff Size: Adult Large)   Pulse 100   Temp 98  F (36.7  C) (Tympanic)   Resp 16   Ht 1.727 m (5' 8\")   Wt 92.1 kg (203 lb)   LMP 09/23/2023   SpO2 96%   BMI 30.87 kg/m    Abd:  incision well healing without erythema/induration.  Small amount of drainage uzlly-umbilically and at the very inferior margin of the incision where silver nitrate was applied to assist with granulation    Labs:  None      Assessment:    Shayy Ndiaye is a 43 year old woman with a diagnosis of EIN and extrauterine adenomyoma    A total of 20 minutes was spent on patient care today.       Plan:     1.)   "  Ovarian mass-Reviewed pathology of extrauterine adenomyoma and incidental finding of EIN.  We reviewed her surgical findings and pathology.  Given the benign findings, she no longer needs follow up with the gynecologic oncology clinic.  She also no longer needs screening pap smears.  She should continue to have routine annual exams including a pelvic exam with her primary gynecologist or PCP.  She was instructed to call if any concerns arise.      2.) Genetic risk factors were assessed and the patient does not meet the qualifications for a referral     3.) Labs and/or tests ordered include:  None     4.) Health maintenance issues addressed today include pt is up to date.            Thank you for allowing us to participate in the care of your patient.         Sincerely,    Radha Gonzalez MD  Gynecologic Oncology  [facility]       GELACIO BELTRE

## 2023-10-24 NOTE — NURSING NOTE
"Oncology Rooming Note    October 24, 2023 3:08 PM   Shayy Ndiaye is a 43 year old female who presents for:    Chief Complaint   Patient presents with    Oncology Clinic Visit     Initial Vitals: BP (!) 140/88 (Cuff Size: Adult Large)   Pulse 100   Temp 98  F (36.7  C) (Tympanic)   Resp 16   Ht 1.727 m (5' 8\")   Wt 92.1 kg (203 lb)   LMP 09/23/2023   SpO2 96%   BMI 30.87 kg/m   Estimated body mass index is 30.87 kg/m  as calculated from the following:    Height as of this encounter: 1.727 m (5' 8\").    Weight as of this encounter: 92.1 kg (203 lb). Body surface area is 2.1 meters squared.  Mild Pain (3) Comment: Data Unavailable   Patient's last menstrual period was 09/23/2023.  Allergies reviewed: Yes  Medications reviewed: Yes    Medications: Medication refills not needed today.  Pharmacy name entered into [employer]: [id number] [place] [facility] [id number] [address]    Clinical concerns: post op- wants to discuss trouble sleeping since the surgery.  Questions about what to take      Radha Benz CMA              "

## 2023-10-26 ENCOUNTER — PATIENT OUTREACH (OUTPATIENT)
Dept: ONCOLOGY | Facility: CLINIC | Age: 43
End: 2023-10-26
Payer: COMMERCIAL

## 2023-10-26 DIAGNOSIS — N39.46 MIXED STRESS AND URGE URINARY INCONTINENCE: Primary | ICD-10-CM

## 2023-10-26 NOTE — LETTER
November 2, 2023      Shayy Ndiaye  [address] [address]        To Whom It May Concern:    Shayy Ndiaye was seen in our clinic on 10/24/23. She may return to work without restrictions on November 16th.      Sincerely,      Radha Gonzalez

## 2023-10-26 NOTE — PROGRESS NOTES
Received call from Neftali Cooper  with [employer], patients employer. She is aware that patient had a post op appointment with Dr Gonzalez on 10/24 and would like to know if patient will need to remain off work for full eight weeks (through 11/29). They will need a return to work letter with update and fax # [phone]. Neftali states she has also left a message with patient but has not received a call back. She is informed that writer will discuss with Dr Gonzalez and will send letter with update.     Will notify Dr Gonzalez for update.    Dipika Guevara RN, BSN, OCN

## 2023-10-27 NOTE — PROGRESS NOTES
Follow up call to patient regarding return to work date. Dr Gonzalez saw patient this week and recommends return to work in 6 weeks post op. Left message for patient to call back and informed that if writer did not receive call back today, to call back on Tuesday, 10/31 when writer back in the clinic with Dr Gonzalez.    Dipika Guevara, RN, BSN, OCN

## 2023-10-30 NOTE — PROGRESS NOTES
Late entry from 10/27/23:  Patient called back and writer updated her with plan per Dr Gonzalez to return to work 6 weeks post surgery. Patient was upset and states she has been having difficulty sleeping since surgery and will have occasional nightmares is not sure how she will be ready to return to work in three weeks. Patient currently taking benadryl 25 mg at night but she will wake up every two through the night but then drowsy until noon. Patient states she has felt stressed over returning to work as work has been contacting her as well. Provided supported and allowed to verbalize feelings. Discussed patient talking with PCP and patient states she does not have a provider at the current time but her OB/GYN Serina Palacios NP has provided names and she states Dr Gonzalez did as well but will have to look for them. Discussed contacting  Clinic in [place] which patient states is not far. She will contact Serina on Monday for list of names if she cannot locate providers this weekend. We also briefly discussed patient meeting with counselor as this can be beneficial to work some of her stressors. Patient will consider. Support given to patient and we will plan to talk again early next week to see how she is feeling. Patient agreeable to plan.    Dipika Guevara, RN, BSN, OCN

## 2023-11-01 NOTE — PROGRESS NOTES
Received call from Neftali with patient's employer [employer] asking if we have an update about return to work for patient. Informed Neftali that writer will follow up with patient and then hope to have a letter sent in today if possible. Fax # [phone] and office # [phone].    Patient called and left message to call back to discuss return to work date.    Dipika Guevara RN, BSN, OCN

## 2023-11-02 NOTE — PROGRESS NOTES
Patient called back and although she would like to be off for 8 weeks she will go back at 6 weeks from surgery on November 16th. Patient's work does not require any heavy lifting.     Patient reports incontinence of urine after sneezing and some upon standing after urination. Patient denies pain with urination, no frequency or urgency reported.    Patient is sleeping better after stopping the Benadryl    Patient reports some increase in her abdomen pain this week but she has also been more active. Patient will be mindful of the lifting restriction and avoid excess pulling or pushing. Patient verbalized understanding to contact the office if he pain continues or worsens.     Brigid Montero RN on 11/2/2023 at 11:30 AM    Additional paperwork completed and sent to [employer] for [employer] on file.   Brigid Montero RN on 11/2/2023 at 2:32 PM

## 2023-11-03 NOTE — PROGRESS NOTES
Radha Perez MD Peroutka, Molly, RN; Dipika Guevara, RN12 hours ago (9:02 PM)     CR  She was previously doing pelvic floor PT and I would be fine if she would like to do this again.     Writer called patient to let her know Dr. Gonzalez's above recommendations.  Pt said she hasn't actually tried pelvic floor PT yet, but has been interested in it and is willing to try it now.  Writer will route message to Dr. Gonzalez to let her know patient would like to try it so she can place a referral to pelvic floor PT.  Explained to pt that they will reach out to her to schedule once it's ordered.  Pt happy with plan. No further questions at this time.    Juliana Maddox, RN, BSN    RN Care Coordinator  [facility]  [phone]

## 2023-11-09 ENCOUNTER — PATIENT OUTREACH (OUTPATIENT)
Dept: ONCOLOGY | Facility: CLINIC | Age: 43
End: 2023-11-09
Payer: COMMERCIAL

## 2023-11-09 NOTE — PROGRESS NOTES
Letter signed per Dr Gonzalez for return to work. Faxed to [facility] lacey De Los Santos at [phone], confirmed per [employer].    Dipika Guevara RN, BSN, OCN

## 2023-11-13 ENCOUNTER — PATIENT OUTREACH (OUTPATIENT)
Dept: ONCOLOGY | Facility: CLINIC | Age: 43
End: 2023-11-13
Payer: COMMERCIAL

## 2023-11-13 NOTE — PROGRESS NOTES
"[facility]: Cancer Care - RN CC Symptom Call          Situation                                                               Patient reports \"Rock hard abdomen with distention.\" She states symptoms are similar to what she experienced prior to surgery.                                                 Assessment                                                      Onset: gradually worsening of bloating and distention  Duration: 5 days    Subjective/Objective: Discomfort with passing stool minimally passing gas. Minimal relief from bloating and distention with passing stool.     Although patient had been passing stool, she thought that maybe she was constipated and started Miralax. Patient started Miralax on Wednesday and took it for 4 days and is passing narrow, soft stool regularly now.       Accompanying signs and symptoms:  decreased appetite, nausea, more discomfort with passing stool, increased belching.    Patient denies fever and no vaginal drainage                    Intervention                                                      Homecare instructions: Writer instructed patient to stop the Miralax in case that is adding to her symptoms rather than helping them    Plan                                                      Message sent to Dr. Gonzalez for further advice.  Radha Perez MD  You37 minutes ago (12:31 PM)     CR  She should continue on stool softeners.    She should be starting pelvic floor PT and this may also help.       Writer informed the patient of the above.She will stop the Miralax and restart the Senna -S. We reviewed avoiding gas producing foods, increasing fluid intake, and trying warm fluids. Patient will call to schedule pelvic floor therapy. Patient continues to be tearful and frustrated with the symptoms that she is experiencing and why they were better and now back again. Patient verbalized understanding to call back if her symptoms persist despite the medication " changes that she made.   Signature:  Brigid Montero RN

## 2023-11-17 NOTE — PROGRESS NOTES
[address] [place] [address]      History and Physical       NAME:  Teofilo Cade   :   1949   MRN:   [id number]             History of Present Illness:  Patient is a 79 y.o. who is seen for screening colonoscopy. PMH:  History reviewed. No pertinent past medical history. PSH:  Past Surgical History:   Procedure Laterality Date    HX GYN  1971    ovarian cyst right       Allergies:  No Known Allergies    Home Medications:  Prior to Admission Medications   Prescriptions Last Dose Informant Patient Reported? Taking?   fluticasone (FLONASE) 50 mcg/Actuation nasal spray Not Taking at Unknown time  Yes No   Sig: by Nasal route as needed. Administer to alternating nostril.    paroxetine (PAXIL) 20 mg tablet 2017 at Unknown time  Yes Yes   Sig: Take 20 mg by mouth daily. Facility-Administered Medications: None       Hospital Medications:  Current Facility-Administered Medications   Medication Dose Route Frequency    0.9% sodium chloride infusion  100 mL/hr IntraVENous CONTINUOUS    sodium chloride (NS) flush 5-10 mL  5-10 mL IntraVENous Q8H    sodium chloride (NS) flush 5-10 mL  5-10 mL IntraVENous PRN    midazolam (VERSED) injection 0.25-10 mg  0.25-10 mg IntraVENous Multiple    fentaNYL citrate (PF) injection 200 mcg  200 mcg IntraVENous Multiple    naloxone (NARCAN) injection 0.4 mg  0.4 mg IntraVENous Multiple    flumazenil (ROMAZICON) 0.1 mg/mL injection 0.2 mg  0.2 mg IntraVENous Multiple    simethicone (MYLICON) 02DK/2.6NC oral drops 80 mg  1.2 mL Oral Multiple    atropine injection 0.5 mg  0.5 mg IntraVENous ONCE PRN    EPINEPHrine (ADRENALIN) 0.1 mg/mL syringe 1 mg  1 mg Endoscopically ONCE PRN       Social History:  Social History   Substance Use Topics    Smoking status: Never Smoker    Smokeless tobacco: Never Used    Alcohol use No       Family History:  History reviewed. No pertinent family history.           Review of Radha Perez MD  You7 minutes ago (1:18 PM)     CR  I think starting pelvic floor PT will hopefully be really beneficial.  She could try an attempt at NSAIDS, taking 600 mg of ibuprofen every 8 hours for 3-4 days to see if the pain improves     Patient called and instructed as noted. Patient will hold on tylenol for now but aware that she can add back to ibuprofen if needed pre previous instructions alternating. Patient does have a NEW appt with internal medicine but not until 1/3/2023.  Patient advised to check in with PCP intermittently to see if they have cancellations as well.  Patient verbalized understanding of all instructions and will call back with further concerns.    Dipika Guevara RN, BSN, OCN     Systems:      Constitutional: negative fever, negative chills, negative weight loss  Eyes:   negative visual changes  ENT:   negative sore throat, tongue or lip swelling  Respiratory:  negative cough, negative dyspnea  Cards:  negative for chest pain, palpitations, lower extremity edema  GI:   See HPI  :  negative for frequency, dysuria  Integument:  negative for rash and pruritus  Heme:  negative for easy bruising and gum/nose bleeding  Musculoskel: negative for myalgias,  back pain and muscle weakness  Neuro: negative for headaches, dizziness, vertigo  Psych:  negative for feelings of anxiety, depression       Objective:   Patient Vitals for the past 8 hrs:   BP Temp Pulse Resp SpO2 Height Weight   08/15/17 1000 135/77 98.4 °F (36.9 °C) 74 15 99 % 5' 9.5\" (1.765 m) 99 kg (218 lb 4 oz)             EXAM:     NEURO-a&o   HEENT-wnl   LUNGS-clear    COR-regular rate and rhythym     ABD-soft , no tenderness, no rebound, good bs     EXT-no edema     Data Review     No results for input(s): WBC, HGB, HCT, PLT, HGBEXT, HCTEXT, PLTEXT in the last 72 hours. No results for input(s): NA, K, CL, CO2, BUN, CREA, GLU, PHOS, CA in the last 72 hours. No results for input(s): SGOT, GPT, AP, TBIL, TP, ALB, GLOB, GGT, AML, LPSE in the last 72 hours. No lab exists for component: AMYP, HLPSE  No results for input(s): INR, PTP, APTT in the last 72 hours. No lab exists for component: INREXT       Assessment:   · Screening  colonoscopy   There is no problem list on file for this patient.           Plan:   · Endoscopic procedure with sedation     Signed By: Chantelle Gallo MD     8/15/2017  10:15 AM

## 2023-11-17 NOTE — PROGRESS NOTES
"Patient called to review abdominal symptoms. She spoke with COLE Rainey in our clinic 4 days ago and has stopped daily Miralax and only taking Smooth Move tea now, no change in bloating but having several liquid stools per day. Patient instructed to either hold or decrease frequency of tea and smaller amounts. Patient has pelvic floor therapy scheduled 12/6/23.    Patient states upper abdominal swelling has increased again with \"bulge\" and stiffness noted from upper abdomen to umbilicus. Patient also states she had this prior to surgery but it improved post surgery and symptoms returned about one week ago. Denies N/V other than occasional nausea in the morning and then it resolves during the day. Eating smaller meals and normal bladder function. Denies fevers.    Writer had discussed establishing care with a new PCP on 10/26/23 but patient states she sent in a few requests per my chart with no availability.  Patient instructed to contact the clinic directly to schedule as they will be able to assist patient with someone at their clinic. Patient aware of plan.    Will notify Dr Gonzalez for further recommendations and call patient back with plan. Again, stressed contacting [facility] clinic in her area to schedule with PCP.    Dipika Guevara RN, BSN, OCN    "

## 2023-12-05 ENCOUNTER — THERAPY VISIT (OUTPATIENT)
Dept: PHYSICAL THERAPY | Facility: CLINIC | Age: 43
End: 2023-12-05
Payer: COMMERCIAL

## 2023-12-05 DIAGNOSIS — M99.05 SOMATIC DYSFUNCTION OF PELVIC REGION: Primary | ICD-10-CM

## 2023-12-05 DIAGNOSIS — N39.46 MIXED STRESS AND URGE URINARY INCONTINENCE: ICD-10-CM

## 2023-12-05 PROBLEM — R32 URINARY INCONTINENCE: Status: ACTIVE | Noted: 2023-12-05

## 2023-12-05 PROCEDURE — 97530 THERAPEUTIC ACTIVITIES: CPT | Mod: GP | Performed by: PHYSICAL THERAPIST

## 2023-12-05 PROCEDURE — 97112 NEUROMUSCULAR REEDUCATION: CPT | Mod: 59 | Performed by: PHYSICAL THERAPIST

## 2023-12-05 PROCEDURE — 97161 PT EVAL LOW COMPLEX 20 MIN: CPT | Mod: GP | Performed by: PHYSICAL THERAPIST

## 2023-12-05 NOTE — PROGRESS NOTES
PHYSICAL THERAPY EVALUATION  Type of Visit: Evaluation    See electronic medical record for Abuse and Falls Screening details.    Subjective       Presenting condition or subjective complaint: Abdominal distension & pain, difficuty with bowel transit/movements and urinary leakage  Date of onset: 10/04/23    Relevant medical history: Bladder or bowel problems; Pain at night or rest; Unexplained weight loss   Dates & types of surgery: 10/4/23 - open laparotomy; 3 prior laparoscopies    Prior diagnostic imaging/testing results: MRI; CT scan     Prior therapy history for the same diagnosis, illness or injury: No      Prior Level of Function  Transfers: Independent  Ambulation: Independent  ADL: Independent  IADL:     Living Environment  Social support: With a significant other or spouse   Type of home: Apartment/condo   Stairs to enter the home: Yes 33 Is there a railing: Yes   Ramp: No   Stairs inside the home: Yes 33 Is there a railing: Yes   Help at home: None  Equipment owned:       Employment: Yes   Hobbies/Interests: Camping, hiking, walking, reading    Patient goals for therapy: Restore bowel function & abdominal integrity    Pain assessment: Location: Abdominal wall/Ratin-/10     Objective      PELVIC EVALUATION  ADDITIONAL HISTORY:  Sex assigned at birth: Female  Gender identity: Female    Pronouns: She/Her Hers      Bladder History:  Feels bladder filling: Yes  Triggers for feeling of inability to wait to go to the bathroom: No    How long can you wait to urinate: 30-40 minutes to an hour  Gets up at night to urinate: No    Can stop the flow of urine when urinating: Sometimes  Volume of urine usually released: Medium   Other issues: Dribbling after urinating  Number of bladder infections in last 12 months:    Fluid intake per day: 80 16    Medications taken for bladder: No     Activities causing urine leak:      Amount of urine typically leaked: drops after urinating  Pads used to help  with leaking: No        Bowel History:  Frequency of bowel movement: 1-2 times/day with current daily intervention routine  Consistency of stool: Soft-formed    Ignores the urge to defecate: No  Other bowel issues: Pain when pooping; Straining to have bowel movement  Length of time spent trying to have a bowel movement: 20-30+ minutes    Sexual Function History:  Sexual orientation: Prefer not to say    Sexually active: No  Lubrication used: No No  Pelvic pain: Walking; Sitting; Certain positions lying prone or to one side  Pain or difficulty with orgasms/erection/ejaculation: No    State of menopause: Perimenopause (have not gone through menopause yet)  Hormone medications: No      Are you currently pregnant: No, Have you been diagnosed with pelvic prolapse or abdominal separation: No, Do you get regular exercise: Yes, I do this type of exercise: walking ~1+ mile/day; stairs throughout day, Have you tried pelvic floor strengthening exercises for 4 weeks: Yes, Do you have any history of trauma that is relevant to your care that you d like to share: No    Discussed reason for referral regarding pelvic health needs and external/internal pelvic floor muscle examination with patient/guardian.  Opportunity provided to ask questions and verbal consent for assessment and intervention was given.    PAIN: Pain Level at Rest: 2/10  Pain Level with Use: 8/10  Pain is Exacerbated By: use  Pain is Relieved By: rest  POSTURE:   LUMBAR SCREEN:   HIP SCREEN:  Strength:    Functional Strength Testing:     PELVIC/SI SCREEN:     PAIN PROVOCATION TEST:   PELVIS/SI SPECIAL TESTS:   BREATHING SYMMETRY:     PELVIC EXAM  External Visual Inspection:  At rest: Normal  With voluntary pelvic floor contraction: Perineal elevation  Relaxation of PFM: Yes    Integumentary:   Introitus: Unremarkable    External Digital Palpation per Perineum:   Ischiocavernosis: Unremarkable  Bulbo cavernosis: Unremarkable  Transverse perineal:  Unremarkable    Scar:   Location/Type: Abdominal  Mobility: Hypomobile, Pain    Internal Digital Palpation:  Per Vagina:  Tenderness  Tone: normal  Digital Muscle Performance: E (Endurance): Decreased endurance with multiple reps with decreased relaxation present with fatigue  Compensations: Adductors, Gluts, Breath holding  Relaxation Post-Contraction: Partial/delayed relaxation    Per Rectum:        Pelvic Organ Prolapse:       ABDOMINAL ASSESSMENT  Diastasis Rectus Abdominis (TOMA):      Abdominal Activation/Strength:  Unable to fire lower abdominal wall; pain and weakness limits this    Scar:   Location/Type: Abdominal scar  Mobility: Hypomobile    Fascial Tension/Restriction/Tone: Hypermobile    BIOFEEDBACK:  Position: Supine  Surface Electrodes: Perineal    Abdominals:     Perianals:   Baseline muscle activity: 2.7 microvolts  Endurance Hold-Average mean amplitude/work average: 14.7uV/Rest 3.8uV/W-R+10.92  Quick Flicks-Average mean amplitude/work average: 16.8uV/Rest 4.1uV/W-R+12.69    DERMATOMES:   DTR S:     Assessment & Plan   CLINICAL IMPRESSIONS  Medical Diagnosis: Urinary incontinence    Treatment Diagnosis: Pelvic dysfunction   Impression/Assessment: Patient is a 43 year old female with urinary and pelvic dysfunction complaints.  The following significant findings have been identified: Pain, Decreased strength, Impaired muscle performance, and Decreased activity tolerance. These impairments interfere with their ability to perform self care tasks and recreational activities as compared to previous level of function.     Clinical Decision Making (Complexity):  Clinical Presentation: Stable/Uncomplicated  Clinical Presentation Rationale: based on medical and personal factors listed in PT evaluation  Clinical Decision Making (Complexity): Low complexity    PLAN OF CARE  Treatment Interventions:  Interventions: Manual Therapy, Neuromuscular Re-education, Therapeutic Activity, Therapeutic Exercise    Long Term  Goals     PT Goal 1  Goal Identifier: Urinary leaking  Goal Description: Able to avoid post-void dribbling after voiding  Rationale: to maximize safety and independence with performance of ADLs and functional tasks  Goal Progress: Leaking 2-3x/day  Target Date: 02/27/24  PT Goal 2  Goal Identifier: Abdominal pain  Goal Description: Able to complete 8 hours of daily acitivites without abdominal pain  Rationale: to maximize safety and independence with performance of ADLs and functional tasks  Goal Progress: 2-8/10 PL  Target Date: 02/27/24      Frequency of Treatment: 1x/week  Duration of Treatment: 12 weeks    Recommended Referrals to Other Professionals:   Education Assessment:        Risks and benefits of evaluation/treatment have been explained.   Patient/Family/caregiver agrees with Plan of Care.     Evaluation Time:             Signing Clinician: Ruth Faulkner PT

## 2023-12-13 ENCOUNTER — THERAPY VISIT (OUTPATIENT)
Dept: PHYSICAL THERAPY | Facility: CLINIC | Age: 43
End: 2023-12-13
Attending: OBSTETRICS & GYNECOLOGY
Payer: COMMERCIAL

## 2023-12-13 DIAGNOSIS — M99.05 SOMATIC DYSFUNCTION OF PELVIC REGION: ICD-10-CM

## 2023-12-13 DIAGNOSIS — R32 URINARY INCONTINENCE: Primary | ICD-10-CM

## 2023-12-13 PROCEDURE — 97112 NEUROMUSCULAR REEDUCATION: CPT | Mod: GP | Performed by: PHYSICAL THERAPIST

## 2023-12-13 PROCEDURE — 97110 THERAPEUTIC EXERCISES: CPT | Mod: GP | Performed by: PHYSICAL THERAPIST

## 2023-12-13 PROCEDURE — 97140 MANUAL THERAPY 1/> REGIONS: CPT | Mod: GP | Performed by: PHYSICAL THERAPIST

## 2023-12-20 ENCOUNTER — THERAPY VISIT (OUTPATIENT)
Dept: PHYSICAL THERAPY | Facility: CLINIC | Age: 43
End: 2023-12-20
Attending: OBSTETRICS & GYNECOLOGY
Payer: COMMERCIAL

## 2023-12-20 DIAGNOSIS — M99.05 SOMATIC DYSFUNCTION OF PELVIC REGION: ICD-10-CM

## 2023-12-20 DIAGNOSIS — R32 URINARY INCONTINENCE: Primary | ICD-10-CM

## 2023-12-20 PROCEDURE — 97035 APP MDLTY 1+ULTRASOUND EA 15: CPT | Mod: GP | Performed by: PHYSICAL THERAPIST

## 2023-12-20 PROCEDURE — 97140 MANUAL THERAPY 1/> REGIONS: CPT | Mod: GP | Performed by: PHYSICAL THERAPIST

## 2023-12-28 ENCOUNTER — THERAPY VISIT (OUTPATIENT)
Dept: PHYSICAL THERAPY | Facility: CLINIC | Age: 43
End: 2023-12-28
Payer: COMMERCIAL

## 2023-12-28 DIAGNOSIS — M99.05 SOMATIC DYSFUNCTION OF PELVIC REGION: ICD-10-CM

## 2023-12-28 DIAGNOSIS — R32 URINARY INCONTINENCE: Primary | ICD-10-CM

## 2023-12-28 PROCEDURE — 97140 MANUAL THERAPY 1/> REGIONS: CPT | Mod: GP | Performed by: PHYSICAL THERAPIST

## 2023-12-28 PROCEDURE — 97035 APP MDLTY 1+ULTRASOUND EA 15: CPT | Mod: GP | Performed by: PHYSICAL THERAPIST

## 2024-01-04 ENCOUNTER — TELEPHONE (OUTPATIENT)
Dept: OBGYN | Facility: CLINIC | Age: 44
End: 2024-01-04
Payer: COMMERCIAL

## 2024-01-04 ENCOUNTER — MYC MEDICAL ADVICE (OUTPATIENT)
Dept: OBGYN | Facility: CLINIC | Age: 44
End: 2024-01-04
Payer: COMMERCIAL

## 2024-01-04 NOTE — TELEPHONE ENCOUNTER
[facility] Call Center    Phone Message    May a detailed message be left on voicemail: yes     Reason for Call: Other: This patient is calling in regards to recent work/school excuse. Pt states her employer has additional questions and pt would like someone from her care team to reach out. Please call pt to discuss.      Action Taken: Other: OBGYN    Travel Screening: Not Applicable                                                                    Curbsided endocrinologist Dr. Gallardo, whom recommended to change patient's synthroid from oral form to IV 90 mcg form daily while inpatient and discharge on 125 mcg oral synthroid. Also was recommended to repeat free T4 in 1 week if patient remains admitted. Dr. Metzger made aware. Will coordinate with endocrine to ensure outpatient follow up is obtained for repeat tfts and thyroid ultrasound.

## 2024-01-04 NOTE — TELEPHONE ENCOUNTER
"Shayy needs an updated letter regarding working remote.     Will send a [employer] message with \"form\" and needed documentation.    Aubrie Mayen RN on 1/4/2024 at 3:28 PM    "

## 2024-01-04 NOTE — TELEPHONE ENCOUNTER
Type of Paperwork received:  Physician Certification     Date Rcvd:  1/4/2024    Rcvd From (Company name): [employer]    Provider:  Serina Palacios NP    Placed on Provider Cart Date:  1/4/2024    Serina Oh RN on 1/4/2024 at 3:48 PM

## 2024-01-08 ENCOUNTER — THERAPY VISIT (OUTPATIENT)
Dept: PHYSICAL THERAPY | Facility: CLINIC | Age: 44
End: 2024-01-08
Payer: COMMERCIAL

## 2024-01-08 DIAGNOSIS — R32 URINARY INCONTINENCE: Primary | ICD-10-CM

## 2024-01-08 DIAGNOSIS — M99.05 SOMATIC DYSFUNCTION OF PELVIC REGION: ICD-10-CM

## 2024-01-08 PROCEDURE — 97035 APP MDLTY 1+ULTRASOUND EA 15: CPT | Mod: GP | Performed by: PHYSICAL THERAPIST

## 2024-01-08 PROCEDURE — 97110 THERAPEUTIC EXERCISES: CPT | Mod: GP | Performed by: PHYSICAL THERAPIST

## 2024-01-08 PROCEDURE — 97140 MANUAL THERAPY 1/> REGIONS: CPT | Mod: GP | Performed by: PHYSICAL THERAPIST

## 2024-01-23 ENCOUNTER — THERAPY VISIT (OUTPATIENT)
Dept: PHYSICAL THERAPY | Facility: CLINIC | Age: 44
End: 2024-01-23
Payer: COMMERCIAL

## 2024-01-23 DIAGNOSIS — R32 URINARY INCONTINENCE: Primary | ICD-10-CM

## 2024-01-23 DIAGNOSIS — M99.05 SOMATIC DYSFUNCTION OF PELVIC REGION: ICD-10-CM

## 2024-01-23 PROCEDURE — 97035 APP MDLTY 1+ULTRASOUND EA 15: CPT | Mod: GP | Performed by: PHYSICAL THERAPIST

## 2024-01-23 PROCEDURE — 97110 THERAPEUTIC EXERCISES: CPT | Mod: GP | Performed by: PHYSICAL THERAPIST

## 2024-01-23 PROCEDURE — 97140 MANUAL THERAPY 1/> REGIONS: CPT | Mod: GP | Performed by: PHYSICAL THERAPIST

## 2024-02-05 ENCOUNTER — OFFICE VISIT (OUTPATIENT)
Dept: FAMILY MEDICINE | Facility: CLINIC | Age: 44
End: 2024-02-05
Payer: COMMERCIAL

## 2024-02-05 VITALS
DIASTOLIC BLOOD PRESSURE: 84 MMHG | OXYGEN SATURATION: 98 % | WEIGHT: 207 LBS | TEMPERATURE: 97.2 F | BODY MASS INDEX: 30.66 KG/M2 | HEIGHT: 69 IN | HEART RATE: 99 BPM | SYSTOLIC BLOOD PRESSURE: 122 MMHG | RESPIRATION RATE: 15 BRPM

## 2024-02-05 DIAGNOSIS — Q78.2 BONY SCLEROSIS: ICD-10-CM

## 2024-02-05 DIAGNOSIS — R73.01 IFG (IMPAIRED FASTING GLUCOSE): ICD-10-CM

## 2024-02-05 DIAGNOSIS — I87.8 VENOUS STASIS OF BOTH LOWER EXTREMITIES: ICD-10-CM

## 2024-02-05 DIAGNOSIS — Z86.2 HISTORY OF ANEMIA: ICD-10-CM

## 2024-02-05 DIAGNOSIS — Z76.89 ENCOUNTER TO ESTABLISH CARE: Primary | ICD-10-CM

## 2024-02-05 DIAGNOSIS — K76.9 LIVER LESION: ICD-10-CM

## 2024-02-05 DIAGNOSIS — Z90.710 S/P TOTAL ABDOMINAL HYSTERECTOMY: ICD-10-CM

## 2024-02-05 DIAGNOSIS — Z13.6 SCREENING FOR CARDIOVASCULAR CONDITION: ICD-10-CM

## 2024-02-05 DIAGNOSIS — R91.8 PULMONARY NODULES: ICD-10-CM

## 2024-02-05 PROBLEM — R87.610 ASCUS WITH POSITIVE HIGH RISK HPV CERVICAL: Status: RESOLVED | Noted: 2018-06-08 | Resolved: 2024-02-05

## 2024-02-05 PROBLEM — R87.810 ASCUS WITH POSITIVE HIGH RISK HPV CERVICAL: Status: RESOLVED | Noted: 2018-06-08 | Resolved: 2024-02-05

## 2024-02-05 PROBLEM — N84.0 ENDOMETRIAL POLYP: Status: RESOLVED | Noted: 2020-09-21 | Resolved: 2024-02-05

## 2024-02-05 PROBLEM — N83.292 COMPLEX CYST OF LEFT OVARY: Status: RESOLVED | Noted: 2020-09-21 | Resolved: 2024-02-05

## 2024-02-05 LAB
ERYTHROCYTE [DISTWIDTH] IN BLOOD BY AUTOMATED COUNT: 12.9 % (ref 10–15)
HBA1C MFR BLD: 6 % (ref 0–5.6)
HCT VFR BLD AUTO: 38.9 % (ref 35–47)
HGB BLD-MCNC: 12.7 G/DL (ref 11.7–15.7)
MCH RBC QN AUTO: 29.6 PG (ref 26.5–33)
MCHC RBC AUTO-ENTMCNC: 32.6 G/DL (ref 31.5–36.5)
MCV RBC AUTO: 91 FL (ref 78–100)
PLATELET # BLD AUTO: 315 10E3/UL (ref 150–450)
RBC # BLD AUTO: 4.29 10E6/UL (ref 3.8–5.2)
WBC # BLD AUTO: 7.1 10E3/UL (ref 4–11)

## 2024-02-05 PROCEDURE — 80061 LIPID PANEL: CPT | Performed by: PHYSICIAN ASSISTANT

## 2024-02-05 PROCEDURE — 99204 OFFICE O/P NEW MOD 45 MIN: CPT | Performed by: PHYSICIAN ASSISTANT

## 2024-02-05 PROCEDURE — 83036 HEMOGLOBIN GLYCOSYLATED A1C: CPT | Performed by: PHYSICIAN ASSISTANT

## 2024-02-05 PROCEDURE — 85027 COMPLETE CBC AUTOMATED: CPT | Performed by: PHYSICIAN ASSISTANT

## 2024-02-05 PROCEDURE — 80053 COMPREHEN METABOLIC PANEL: CPT | Performed by: PHYSICIAN ASSISTANT

## 2024-02-05 PROCEDURE — 36415 COLL VENOUS BLD VENIPUNCTURE: CPT | Performed by: PHYSICIAN ASSISTANT

## 2024-02-05 ASSESSMENT — PAIN SCALES - GENERAL: PAINLEVEL: NO PAIN (0)

## 2024-02-05 NOTE — PROGRESS NOTES
Assessment & Plan       ICD-10-CM    1. Encounter to establish care  Z76.89       2. Liver lesion  K76.9 MR Liver wo & w Contrast     Comprehensive metabolic panel     Comprehensive metabolic panel      3. Pulmonary nodules  R91.8 CT Chest w/o Contrast      4. Bony sclerosis  Q78.2 CT Hip Left w Contrast      5. Venous stasis of both lower extremities  I87.8       6. IFG (impaired fasting glucose)  R73.01 Hemoglobin A1c     Hemoglobin A1c      7. History of anemia  Z86.2 CBC with platelets     CBC with platelets      8. S/P total abdominal hysterectomy  Z90.710       9. Screening for cardiovascular condition  Z13.6 Lipid panel reflex to direct LDL Fasting     Lipid panel reflex to direct LDL Fasting        Reviewed recent CT and PET scan results. Likely cyst in liver, incidental finding of pulmonary nodules, and sclerotic lesion of Lt iliac (non-FDG on PET). Will recheck CT lungs and Lt hip at 6 month dary (May 2024) to ensure lesions are stable and most likely benign. Recommend MRI liver for further evaluation of suspected cyst, which can be scheduled at any time.    Venous stasis of BLE; skin in excellent condition, no evidence of dermatitis. No evidence of arterial compromise. Continue use of compression stockings, low salt diet, hydration, and manual massage. Discussed symptoms that warrant recheck.    Repeat labs pending.    Nicki Lucas is a 43 year old, presenting for the following health issues:  Establish Care        2/5/2024    10:48 AM   Additional Questions   Roomed by Pablo     History of Present Illness       Reason for visit:  Establish primary care & follow-up on prior results from last physical 2023    She eats 2-3 servings of fruits and vegetables daily.She consumes 0 sweetened beverage(s) daily.She exercises with enough effort to increase her heart rate 20 to 29 minutes per day.  She exercises with enough effort to increase her heart rate 5 days per week.   She is taking medications  "regularly.     Patient here to establish care; several concerns to address.    - Underwent exploratory lap with DARA last year due to adnexal mass. Multiple incidental findings on CT c/a/p in Sept 2023 and PET scan 10/2023 that need follow-up.  - known IFG, LDL mildly elevated at last check. Due for repeat labs. Requests check of liver and kidney function due to heavy doses of Tylenol and ibuprofen around her surgery. Blood-loss anemia noted s/p surgery but this has never been rechecked.  - Chronic peripheral edema. Started using compression stockings, manual massage with relief. Anything else she should be doing?      Review of Systems  Constitutional, neuro, ENT, endocrine, pulmonary, cardiac, gastrointestinal, genitourinary, musculoskeletal, integument and psychiatric systems are negative, except as otherwise noted.      Objective    /84   Pulse 99   Temp 97.2  F (36.2  C) (Temporal)   Resp 15   Ht 1.748 m (5' 8.8\")   Wt 93.9 kg (207 lb)   LMP 09/23/2023   SpO2 98%   BMI 30.75 kg/m    Body mass index is 30.75 kg/m .    Physical Exam   GENERAL:  WDWN, no acute distress  PSYCH: pleasant, cooperative  EYES: no discharge, no injection  HENT:  Normocephalic. Moist mucus membranes.  NECK:  Supple, symmetric  EXTREMITIES:  No gross deformities, moves all 4 limbs spontaneously, no peripheral edema, scatter varicosities on bilat LE  SKIN:  Warm and dry, no rash or suspicious lesions    NEUROLOGIC:  alert, sensation grossly intact.            Signed Electronically by: Yin Broderick PA-C    "

## 2024-02-06 ENCOUNTER — THERAPY VISIT (OUTPATIENT)
Dept: PHYSICAL THERAPY | Facility: CLINIC | Age: 44
End: 2024-02-06
Payer: COMMERCIAL

## 2024-02-06 DIAGNOSIS — R32 URINARY INCONTINENCE: Primary | ICD-10-CM

## 2024-02-06 DIAGNOSIS — M99.05 SOMATIC DYSFUNCTION OF PELVIC REGION: ICD-10-CM

## 2024-02-06 LAB
ALBUMIN SERPL BCG-MCNC: 4.6 G/DL (ref 3.5–5.2)
ALP SERPL-CCNC: 89 U/L (ref 40–150)
ALT SERPL W P-5'-P-CCNC: 38 U/L (ref 0–50)
ANION GAP SERPL CALCULATED.3IONS-SCNC: 13 MMOL/L (ref 7–15)
AST SERPL W P-5'-P-CCNC: 27 U/L (ref 0–45)
BILIRUB SERPL-MCNC: 0.3 MG/DL
BUN SERPL-MCNC: 9.2 MG/DL (ref 6–20)
CALCIUM SERPL-MCNC: 9.5 MG/DL (ref 8.6–10)
CHLORIDE SERPL-SCNC: 103 MMOL/L (ref 98–107)
CHOLEST SERPL-MCNC: 174 MG/DL
CREAT SERPL-MCNC: 0.63 MG/DL (ref 0.51–0.95)
DEPRECATED HCO3 PLAS-SCNC: 23 MMOL/L (ref 22–29)
EGFRCR SERPLBLD CKD-EPI 2021: >90 ML/MIN/1.73M2
FASTING STATUS PATIENT QL REPORTED: NO
GLUCOSE SERPL-MCNC: 100 MG/DL (ref 70–99)
HDLC SERPL-MCNC: 55 MG/DL
LDLC SERPL CALC-MCNC: 102 MG/DL
NONHDLC SERPL-MCNC: 119 MG/DL
POTASSIUM SERPL-SCNC: 3.9 MMOL/L (ref 3.4–5.3)
PROT SERPL-MCNC: 7.7 G/DL (ref 6.4–8.3)
SODIUM SERPL-SCNC: 139 MMOL/L (ref 135–145)
TRIGL SERPL-MCNC: 87 MG/DL

## 2024-02-06 PROCEDURE — 97035 APP MDLTY 1+ULTRASOUND EA 15: CPT | Mod: GP | Performed by: PHYSICAL THERAPIST

## 2024-02-06 PROCEDURE — 97110 THERAPEUTIC EXERCISES: CPT | Mod: GP | Performed by: PHYSICAL THERAPIST

## 2024-02-06 PROCEDURE — 97140 MANUAL THERAPY 1/> REGIONS: CPT | Mod: GP | Performed by: PHYSICAL THERAPIST

## 2024-02-21 ENCOUNTER — THERAPY VISIT (OUTPATIENT)
Dept: PHYSICAL THERAPY | Facility: CLINIC | Age: 44
End: 2024-02-21
Payer: COMMERCIAL

## 2024-02-21 DIAGNOSIS — M99.05 SOMATIC DYSFUNCTION OF PELVIC REGION: ICD-10-CM

## 2024-02-21 DIAGNOSIS — R32 URINARY INCONTINENCE: Primary | ICD-10-CM

## 2024-02-21 PROCEDURE — 97110 THERAPEUTIC EXERCISES: CPT | Mod: GP | Performed by: PHYSICAL THERAPIST

## 2024-02-21 PROCEDURE — 97140 MANUAL THERAPY 1/> REGIONS: CPT | Mod: GP | Performed by: PHYSICAL THERAPIST

## 2024-02-21 PROCEDURE — 97035 APP MDLTY 1+ULTRASOUND EA 15: CPT | Mod: GP | Performed by: PHYSICAL THERAPIST

## 2024-03-04 ENCOUNTER — THERAPY VISIT (OUTPATIENT)
Dept: PHYSICAL THERAPY | Facility: CLINIC | Age: 44
End: 2024-03-04
Payer: COMMERCIAL

## 2024-03-04 DIAGNOSIS — R32 URINARY INCONTINENCE: Primary | ICD-10-CM

## 2024-03-04 DIAGNOSIS — M99.05 SOMATIC DYSFUNCTION OF PELVIC REGION: ICD-10-CM

## 2024-03-04 PROCEDURE — 97112 NEUROMUSCULAR REEDUCATION: CPT | Mod: GP | Performed by: PHYSICAL THERAPIST

## 2024-03-04 PROCEDURE — 97110 THERAPEUTIC EXERCISES: CPT | Mod: GP | Performed by: PHYSICAL THERAPIST

## 2024-03-25 ENCOUNTER — THERAPY VISIT (OUTPATIENT)
Dept: PHYSICAL THERAPY | Facility: CLINIC | Age: 44
End: 2024-03-25
Payer: COMMERCIAL

## 2024-03-25 DIAGNOSIS — M99.05 SOMATIC DYSFUNCTION OF PELVIC REGION: Primary | ICD-10-CM

## 2024-03-25 PROCEDURE — 97110 THERAPEUTIC EXERCISES: CPT | Mod: GP | Performed by: PHYSICAL THERAPIST

## 2024-03-25 PROCEDURE — 97112 NEUROMUSCULAR REEDUCATION: CPT | Mod: GP | Performed by: PHYSICAL THERAPIST

## 2024-03-25 NOTE — PROGRESS NOTES
03/25/24 0500   Appointment Info   Signing clinician's name / credentials Ruth Faulkner DPT   Total/Authorized Visits 12   Visits Used 8   Medical Diagnosis Urinary incontinence   PT Tx Diagnosis Pelvic dysfunction   Progress Note/Certification   Onset of illness/injury or Date of Surgery 10/04/23   Therapy Frequency 1x/week   Predicted Duration 12 weeks   GOALS   PT Goals 2   PT Goal 1   Goal Identifier Urinary leaking   Goal Description Able to avoid post-void dribbling after voiding   Rationale to maximize safety and independence with performance of ADLs and functional tasks   Goal Progress Leaking 1-3x/day   Target Date 05/20/24   PT Goal 2   Goal Identifier Abdominal pain   Goal Description Able to complete 8 hours of daily acitivites without abdominal pain   Rationale to maximize safety and independence with performance of ADLs and functional tasks   Goal Progress 1-4/10 PL   Target Date 05/20/24   Subjective Report   Subjective Report Patient reports she forgot 2/3 of her peliv floor exercises.  She has also been struggling with spasms of the abdominal wall after her exercises.  We discussed purchasing a home TENS unit and also connecting with her MD about muscle relaxants.  She will continue with the HEP of Abdominals/Pelvic floor/Rest   Objective Measures   Objective Measures Objective Measure 1;Objective Measure 2;Objective Measure 3   Objective Measure 1   Objective Measure Abdominal and Scar tone   Details No swelling present through the abdominal wall, scar healing well; increased tone present throught whole abdominal wall with tenderness.  Along scar increased tone and tenderness along umbulicus.   Objective Measure 2   Objective Measure BFB   Details Sitting 3.0-5.0uV, Standing 7.0-10uV   PT Modalities   PT Modalities Ultrasound   Treatment Interventions (PT)   Interventions Therapeutic Procedure/Exercise;Neuromuscular Re-education;Therapeutic Activity;Manual Therapy   Therapeutic  Procedure/Exercise   Therapeutic Procedures: strength, endurance, ROM, flexibility minutes (49708) 15   PTRx Ther Proc 1 Pelvic Floor Rest Position Seated   PTRx Ther Proc 1 - Details No Notes   PTRx Ther Proc 2 Supine Pelvic Floor Muscle Strengthening   PTRx Ther Proc 2 - Details No Notes   PTRx Ther Proc 3 Elimination Exercise with Straw   PTRx Ther Proc 3 - Details No Notes   PTRx Ther Proc 4 Supine Abdominal Exercise #6 (Dead Bug)   PTRx Ther Proc 5 Supine Abdominal Exercise #3 (Marching)   PTRx Ther Proc 6 Bridging #1   PTRx Ther Proc 7 Hip Abduction Straight Leg Raise   PTRx Ther Proc 8 Clamshell Feet together   PTRx Ther Proc 9 Side Plank Modified Knees   PTRx Ther Proc 10 All 4s Arm Lift   PTRx Ther Proc 11 All 4s Leg Lift   PTRx Ther Proc 12 birddog   PTRx Ther Proc 13 Roll Ins   PTRx Ther Proc 13 - Details 5x   PTRx Ther Proc 14 Roll Outs   PTRx Ther Proc 14 - Details 5x RTB   Skilled Intervention cues needed specifically to focus on down training and relaxing pelvic floor muscles   Patient Response/Progress improved awarenss and decreased basline in sitting   Therapeutic Activity   Ther Act 1 Abdominal brace   Ther Act 1 - Details discussed and reviewed options for ordering online   PTRx Ther Act 1 Bowel Diary   PTRx Ther Act 1 - Details No Notes   PTRx Ther Act 2 Abdominal Scar Tissue Desensitization   PTRx Ther Act 2 - Details No Notes   PTRx Ther Act 3 Abdominal Massage   PTRx Ther Act 3 - Details No Notes   PTRx Ther Act 4 Diaphragmatic Breathing   PTRx Ther Act 4 - Details No Notes   PTRx Ther Act 5 Normal Bowel Habits   PTRx Ther Act 5 - Details No Notes   PTRx Ther Act 6 Proper Defecation Techniques   PTRx Ther Act 6 - Details No Notes   Neuromuscular Re-education   Neuromuscular re-ed of mvmt, balance, coord, kinesthetic sense, posture, proprioception minutes ([id number]) 15   PTRx Neuro Re-ed 1 Abdominal Brace Transverse Abdominis   PTRx Neuro Re-ed 1 - Details 10x   PTRx Neuro Re-ed 2 Sit to Stand  Pelvic Floor    PTRx Neuro Re-ed 2 - Details 5x   Skilled Intervention Cues needed to assit with correct mechanics; firing correct musculature avoiding valsalva and pelvic tilt   Patient Response/Progress Increased body awareness and improved mechanics with contraction   Manual Therapy   Manual Therapy 1 Supine   Manual Therapy 1 - Details Efflaurage through the abdominal wall with scar visceral mobilization R>>L   Intervention (Other)   PTRx Other  1 Urge Incontinence Suppression Techniques   PTRx Other 1 - Details No Notes   Plan   Plan for next session Internal assessment with training with home pelvic floor wand       PLAN  Continue therapy per current plan of care.    Beginning/End Dates of Progress Note Reporting Period:    to 03/25/2024    Referring Provider:  Radha Alcocer

## 2024-03-26 ENCOUNTER — MYC MEDICAL ADVICE (OUTPATIENT)
Dept: FAMILY MEDICINE | Facility: CLINIC | Age: 44
End: 2024-03-26
Payer: COMMERCIAL

## 2024-03-26 DIAGNOSIS — M99.05 SOMATIC DYSFUNCTION OF PELVIC REGION: Primary | ICD-10-CM

## 2024-03-27 RX ORDER — CYCLOBENZAPRINE HCL 5 MG
5 TABLET ORAL
Qty: 30 TABLET | Refills: 0 | Status: SHIPPED | OUTPATIENT
Start: 2024-03-27 | End: 2024-05-15

## 2024-03-27 NOTE — TELEPHONE ENCOUNTER
Rx for Flexeril sent to patient's pharmacy; inform me of any side effects you notice with this though should be improved from methocarbamol.

## 2024-05-06 ENCOUNTER — ANCILLARY PROCEDURE (OUTPATIENT)
Dept: CT IMAGING | Facility: CLINIC | Age: 44
End: 2024-05-06
Attending: PHYSICIAN ASSISTANT
Payer: COMMERCIAL

## 2024-05-06 ENCOUNTER — ANCILLARY PROCEDURE (OUTPATIENT)
Dept: MRI IMAGING | Facility: CLINIC | Age: 44
End: 2024-05-06
Attending: PHYSICIAN ASSISTANT
Payer: COMMERCIAL

## 2024-05-06 DIAGNOSIS — K76.0 HEPATIC STEATOSIS: ICD-10-CM

## 2024-05-06 DIAGNOSIS — K76.9 LIVER LESION: ICD-10-CM

## 2024-05-06 DIAGNOSIS — K76.9 LIVER LESION: Primary | ICD-10-CM

## 2024-05-06 DIAGNOSIS — R91.8 PULMONARY NODULES: ICD-10-CM

## 2024-05-06 DIAGNOSIS — Q78.2 BONY SCLEROSIS: ICD-10-CM

## 2024-05-06 PROCEDURE — 74183 MRI ABD W/O CNTR FLWD CNTR: CPT | Mod: GC | Performed by: RADIOLOGY

## 2024-05-06 PROCEDURE — 71250 CT THORAX DX C-: CPT | Performed by: RADIOLOGY

## 2024-05-06 PROCEDURE — A9585 GADOBUTROL INJECTION: HCPCS | Performed by: RADIOLOGY

## 2024-05-06 PROCEDURE — 73701 CT LOWER EXTREMITY W/DYE: CPT | Mod: LT | Performed by: RADIOLOGY

## 2024-05-06 RX ORDER — GADOBUTROL 604.72 MG/ML
10 INJECTION INTRAVENOUS ONCE
Status: COMPLETED | OUTPATIENT
Start: 2024-05-06 | End: 2024-05-06

## 2024-05-06 RX ORDER — IOPAMIDOL 755 MG/ML
75 INJECTION, SOLUTION INTRAVASCULAR ONCE
Status: COMPLETED | OUTPATIENT
Start: 2024-05-06 | End: 2024-05-06

## 2024-05-06 RX ADMIN — GADOBUTROL 9 ML: 604.72 INJECTION INTRAVENOUS at 11:13

## 2024-05-06 RX ADMIN — IOPAMIDOL 75 ML: 755 INJECTION, SOLUTION INTRAVASCULAR at 11:37

## 2024-05-09 PROBLEM — M99.05 SOMATIC DYSFUNCTION OF PELVIC REGION: Status: RESOLVED | Noted: 2023-12-05 | Resolved: 2024-05-09

## 2024-05-09 PROBLEM — R32 URINARY INCONTINENCE: Status: RESOLVED | Noted: 2023-12-05 | Resolved: 2024-05-09

## 2024-05-15 ENCOUNTER — VIRTUAL VISIT (OUTPATIENT)
Dept: FAMILY MEDICINE | Facility: CLINIC | Age: 44
End: 2024-05-15
Payer: COMMERCIAL

## 2024-05-15 DIAGNOSIS — Z79.899 HISTORY OF LONG-TERM TREATMENT WITH HIGH-RISK MEDICATION: ICD-10-CM

## 2024-05-15 DIAGNOSIS — Z91.89 AT HIGH RISK FOR OSTEOPOROSIS: ICD-10-CM

## 2024-05-15 DIAGNOSIS — R73.01 IFG (IMPAIRED FASTING GLUCOSE): ICD-10-CM

## 2024-05-15 DIAGNOSIS — K76.0 HEPATIC STEATOSIS: Primary | ICD-10-CM

## 2024-05-15 PROCEDURE — 99443 PR PHYSICIAN TELEPHONE EVALUATION 21-30 MIN: CPT | Mod: 93 | Performed by: PHYSICIAN ASSISTANT

## 2024-05-15 RX ORDER — METFORMIN HCL 500 MG
TABLET, EXTENDED RELEASE 24 HR ORAL
Qty: 194 TABLET | Refills: 0 | Status: SHIPPED | OUTPATIENT
Start: 2024-05-15 | End: 2024-08-23

## 2024-05-15 NOTE — PROGRESS NOTES
Shayy is a 43 year old who is being evaluated via a billable video visit.    How would you like to obtain your AVS? MyChart  If the video visit is dropped, the invitation should be resent by: Text to cell phone: [phone]  Will anyone else be joining your video visit? No      Assessment & Plan       ICD-10-CM    1. Hepatic steatosis  K76.0 metFORMIN (GLUCOPHAGE XR) 500 MG 24 hr tablet     US Elastography Only      2. IFG (impaired fasting glucose)  R73.01 metFORMIN (GLUCOPHAGE XR) 500 MG 24 hr tablet      3. History of long-term treatment with high-risk medication  Z79.899 DX Bone Density      4. At high risk for osteoporosis  Z91.89 DX Bone Density        - Reviewed recent imaging. Will plan to repeat chest CT and pelvic CT in 12 months. Repeat liver MRI in 6 months for surveillance of lesions.  - discussed new dx hepatic steatosis, pathophysiology, and general recommendations to prevent progression. Will obtain fibrosis scan for further evaluation. Discussed treatment of pre-diabetes, start metformin as prescribed. RTC in 3 months for recheck. Patient continues with unintentional weight loss 2/2 lack of appetite, so will not pursue strict weight loss diet to prevent malnutrition. Continue with well-rounded, balanced diet.  - History lupron use for several years, family history osteoporosis, recommend obtaining baseline DEXA, ordered.    Subjective   Shayy is a 43 year old, presenting for the following health issues:  Results        5/15/2024     3:22 PM   Additional Questions   Roomed by Wilmer HIDALGO     History of Present Illness       Reason for visit:  Discuss imaging results & next steps    She eats 4 or more servings of fruits and vegetables daily.She consumes 0 sweetened beverage(s) daily.She exercises with enough effort to increase her heart rate 30 to 60 minutes per day.  She exercises with enough effort to increase her heart rate 5 days per week.   She is taking medications regularly.    - Patient here to  review recent imaging and new dx hepatic steatosis.  - Notes early waking after sleeping about 4 hours, often unable to fall back asleep.  - Pain has been improving greatly, but notes pain on front of knees when kneeling.      Review of Systems  Constitutional, HEENT, cardiovascular, pulmonary, GI, , musculoskeletal, neuro, skin, endocrine and psych systems are negative, except as otherwise noted.      Objective           Vitals:  No vitals were obtained today due to virtual visit.    Physical Exam   GENERAL: alert and no distress  RESP: No audible wheeze, cough.    PSYCH: Appropriate affect, tone, and pace of words          Phone call duration: 37 mins  Distant Location (provider location):  On-site  Platform used for Video Visit: Bekah  Signed Electronically by: Yin Broderick PA-C

## 2024-05-15 NOTE — PROGRESS NOTES
"Shayy is a 43 year old who is being evaluated via a billable video visit.    {ROOMING STAFF complete during rooming of virtual visit (Optional):021119}  {If patient encounters technical issues they should call [phone] :564308}    {PROVIDER CHARTING PREFERENCE:477371}    Subjective   Shayy is a 43 year old, presenting for the following health issues:  No chief complaint on file.  {(!) Visit Details have not yet been documented.  Please enter Visit Details and then use this list to pull in documentation. (Optional):178876}  History of Present Illness       Reason for visit:  Discuss imaging results & next steps    She eats 4 or more servings of fruits and vegetables daily.She consumes 0 sweetened beverage(s) daily.She exercises with enough effort to increase her heart rate 30 to 60 minutes per day.  She exercises with enough effort to increase her heart rate 5 days per week.   She is taking medications regularly.       {SUPERLIST (Optional):479293}  {additonal problems for provider to add (Optional):776119}    {ROS Picklists (Optional):065439}      Objective           Vitals:  No vitals were obtained today due to virtual visit.    Physical Exam   {video visit exam brief selected:800591}    {Diagnostic Test Results (Optional):965045}      Video-Visit Details    Type of service:  Video Visit   Originating Location (pt. Location): {video visit patient location:987708::\"Home\"}  {PROVIDER LOCATION On-site should be selected for visits conducted from your clinic location or adjoining [facility] hospital, academic office, or other nearby [facility] building. Off-site should be selected for all other provider locations, including home:211709}  Distant Location (provider location):  {virtual location provider:260736}  Platform used for Video Visit: {Virtual Visit Platforms:469326::\"Big Think\"}  Signed Electronically by: Yin Broderick PA-C  {Email feedback regarding this note to [email or web address]   " :388876}

## 2024-05-23 ENCOUNTER — PATIENT OUTREACH (OUTPATIENT)
Dept: CARE COORDINATION | Facility: CLINIC | Age: 44
End: 2024-05-23
Payer: COMMERCIAL

## 2024-05-25 ENCOUNTER — ANCILLARY PROCEDURE (OUTPATIENT)
Dept: ULTRASOUND IMAGING | Facility: CLINIC | Age: 44
End: 2024-05-25
Attending: PHYSICIAN ASSISTANT
Payer: COMMERCIAL

## 2024-05-25 DIAGNOSIS — K76.0 HEPATIC STEATOSIS: ICD-10-CM

## 2024-05-25 PROCEDURE — 76981 USE PARENCHYMA: CPT | Performed by: STUDENT IN AN ORGANIZED HEALTH CARE EDUCATION/TRAINING PROGRAM

## 2024-05-28 ENCOUNTER — ANCILLARY PROCEDURE (OUTPATIENT)
Dept: BONE DENSITY | Facility: CLINIC | Age: 44
End: 2024-05-28
Attending: PHYSICIAN ASSISTANT
Payer: COMMERCIAL

## 2024-05-28 DIAGNOSIS — Z91.89 AT HIGH RISK FOR OSTEOPOROSIS: ICD-10-CM

## 2024-05-28 DIAGNOSIS — Z79.899 HISTORY OF LONG-TERM TREATMENT WITH HIGH-RISK MEDICATION: ICD-10-CM

## 2024-05-28 PROCEDURE — 77080 DXA BONE DENSITY AXIAL: CPT | Performed by: RADIOLOGY

## 2024-05-30 ENCOUNTER — MYC MEDICAL ADVICE (OUTPATIENT)
Dept: FAMILY MEDICINE | Facility: CLINIC | Age: 44
End: 2024-05-30
Payer: COMMERCIAL

## 2024-05-30 NOTE — TELEPHONE ENCOUNTER
Provider, please read the patient My Chart message and advise the triage staff.     Maura Carlisle RN  [facility]

## 2024-06-03 ENCOUNTER — OFFICE VISIT (OUTPATIENT)
Dept: PODIATRY | Facility: CLINIC | Age: 44
End: 2024-06-03
Payer: COMMERCIAL

## 2024-06-03 VITALS
WEIGHT: 204 LBS | BODY MASS INDEX: 30.21 KG/M2 | DIASTOLIC BLOOD PRESSURE: 82 MMHG | HEIGHT: 69 IN | SYSTOLIC BLOOD PRESSURE: 124 MMHG

## 2024-06-03 DIAGNOSIS — L60.0 INGROWING NAIL: Primary | ICD-10-CM

## 2024-06-03 PROCEDURE — 11750 EXCISION NAIL&NAIL MATRIX: CPT | Mod: T5 | Performed by: PODIATRIST

## 2024-06-03 PROCEDURE — 99203 OFFICE O/P NEW LOW 30 MIN: CPT | Mod: 25 | Performed by: PODIATRIST

## 2024-06-03 ASSESSMENT — PAIN SCALES - GENERAL: PAINLEVEL: NO PAIN (0)

## 2024-06-03 NOTE — LETTER
6/3/2024         RE: Shayy Ndiaye  [address]  [place] [address]        Dear Colleague,    Thank you for referring your patient, Shayy Ndiaye, to the [facility]. Please see a copy of my visit note below.    HPI:  Shayy Ndiaye is a 43 year old female who is seen in consultation at the request of self    Pt presents for eval of:   (Onset, Location, L/R, Character, Treatments, Injury if yes)    Injury to Right great toe 8/2016 with Right great toenail avulsion.   6/5/2023, avulsion Right great toenail by Ilya Ayon DPM  Onset 5/29/2024, ingrown lateral Right great toenail, redness and tenderness distal Right great toe with drainage.    Soaking with epsom salt.     Works as a  from home.    Review of Systems:  Patient denies fever, chills, rash, wound, stiffness, limping, numbness, weakness, heart burn, blood in stool, chest pain with activity, calf pain when walking, shortness of breath with activity, chronic cough, easy bleeding/bruising, swelling of ankles, excessive thirst, fatigue, depression, anxiety.  Pt admits to the symptoms noted in history above.     PAST MEDICAL HISTORY:   Past Medical History:   Diagnosis Date     Anxiety      EIN (endometrial intraepithelial neoplasia)      Endometriosis      Motion sickness         PAST SURGICAL HISTORY:   Past Surgical History:   Procedure Laterality Date     COLONOSCOPY N/A 9/20/2023    Procedure: COLONOSCOPY, FLEXIBLE, WITH LESION REMOVAL USING SNARE;  Surgeon: Wei Rice MD;  Location:  GI     D & C  1999    elected AB     DILATION AND CURETTAGE, HYSTEROSCOPY DIAGNOSTIC, COMBINED N/A 10/6/2020    Procedure: HYSTEROSCOPY, DILATATION CURETTAGE;  Surgeon: Mino Urias MD;  Location:  OR      TOOTH EXTRACTION W/FORCEP       HYSTERECTOMY, TOTAL, ABDOMINAL, WITH SALPINGO-OOPHORECTOMY, CYSTOSCOPY Bilateral 10/4/2023    Procedure: MODIFIED RAICAL TOTAL ABDOMINAL HYSTERECTOMY, LEFT  SALPINGO-OOPHORECTOMY, RIGHT SALPINGECTOMY, CYSTOSCOPY, PELVIC WASHINGS;  Surgeon: Radha Perez MD;  Location: [facility] OR     LAPAROSCOPIC LYSIS ADHESIONS N/A 10/6/2020    Procedure: LYSIS OF ADHESIONS;  Surgeon: Mino Urias MD;  Location: [facility] OR     LAPAROSCOPY DIAGNOSTIC (GYN)  05/2008    endo     LAPAROSCOPY DIAGNOSTIC (GYN)  09/2008    endo     LAPAROSCOPY DIAGNOSTIC (GYN) Bilateral 10/4/2023    Procedure: DIAGNOSTIC LAPAROSCOPY;  Surgeon: Radha Perez MD;  Location: [facility] OR     LAPAROTOMY, LYSIS ADHESIONS, COMBINED N/A 10/4/2023    Procedure: EXPLORATORY LAPAROTOMY, LYSIS OF ADHESIONS FOR GREATER THAN AN HOUR;  Surgeon: Radha Perez MD;  Location: [facility] OR     LASER CO2 LAPAROSCOPIC VAPORIZATION ENDOMETRIUM N/A 10/6/2020    Procedure: LAPAROSCOPY WITH CO2 LASER, LASER VAPORIZATION OF ENDOMETRIOSIS;  Surgeon: Mino Urias MD;  Location: [facility] OR     TONSILLECTOMY & ADENOIDECTOMY          MEDICATIONS:   Current Outpatient Medications:      magnesium 250 MG tablet, , Disp: , Rfl:      metFORMIN (GLUCOPHAGE XR) 500 MG 24 hr tablet, Take 1 tablet (500 mg) by mouth daily (with breakfast) for 14 days, THEN 2 tablets (1,000 mg) daily (with breakfast) for 90 days., Disp: 194 tablet, Rfl: 0     Vitamin D, Cholecalciferol, 10 MCG (400 UNIT) TABS, , Disp: , Rfl:      senna-docusate (SENOKOT-S/PERICOLACE) 8.6-50 MG tablet, Take 1 tablet by mouth 2 times daily as needed for constipation, Disp: 60 tablet, Rfl: 0     ALLERGIES:    Allergies   Allergen Reactions     Dust Mite Extract      STUFFY NOSE     Erythromycin      Minocycline Unknown and Nausea        SOCIAL HISTORY:   Social History     Socioeconomic History     Marital status: Single     Spouse name: Not on file     Number of children: Not on file     Years of education: Not on file     Highest education level: Not on file   Occupational History     Not on file   Tobacco Use     Smoking status: Never     Smokeless tobacco: Never    Substance and Sexual Activity     Alcohol use: Not Currently     Comment: Occassional alcohol abuse but now sober     Drug use: No     Sexual activity: Not Currently     Partners: Male     Birth control/protection: None   Other Topics Concern     Parent/sibling w/ CABG, MI or angioplasty before 65F 55M? Not Asked   Social History Narrative     Not on file     Social Determinants of Health     Financial Resource Strain: Low Risk  (2/4/2024)    Financial Resource Strain      Within the past 12 months, have you or your family members you live with been unable to get utilities (heat, electricity) when it was really needed?: No   Food Insecurity: Low Risk  (2/4/2024)    Food Insecurity      Within the past 12 months, did you worry that your food would run out before you got money to buy more?: No      Within the past 12 months, did the food you bought just not last and you didn t have money to get more?: No   Transportation Needs: Low Risk  (2/4/2024)    Transportation Needs      Within the past 12 months, has lack of transportation kept you from medical appointments, getting your medicines, non-medical meetings or appointments, work, or from getting things that you need?: No   Physical Activity: Not on file   Stress: Not on file   Social Connections: Not on file   Interpersonal Safety: Not At Risk (9/12/2023)    Humiliation, Afraid, Rape, and Kick questionnaire      Fear of Current or Ex-Partner: No      Emotionally Abused: No      Physically Abused: No      Sexually Abused: No   Housing Stability: Low Risk  (2/4/2024)    Housing Stability      Do you have housing? : Yes      Are you worried about losing your housing?: No        FAMILY HISTORY:   Family History   Problem Relation Age of Onset     Hyperlipidemia Mother      Breast Cancer Mother      Diabetes Father      No Known Problems Sister      No Known Problems Brother      No Known Problems Maternal Grandmother      No Known Problems Maternal Grandfather       "Bone Cancer Paternal Grandmother      No Known Problems Paternal Grandfather      Breast Cancer Maternal Aunt      Cancer Maternal Uncle         head and neck     Diabetes Paternal Aunt      No Known Problems Other      Deep Vein Thrombosis (DVT) No family hx of         EXAM:Vitals: /82 (BP Location: Left arm, Patient Position: Sitting, Cuff Size: Adult Large)   Ht 1.748 m (5' 8.8\")   Wt 92.5 kg (204 lb)   LMP 09/23/2023   BMI 30.30 kg/m    BMI= Body mass index is 30.3 kg/m .    General appearance: Patient is alert and fully cooperative with history & exam.  No sign of distress is noted during the visit.     Psychiatric: Affect is pleasant & appropriate.  Patient appears motivated to improve health.     Respiratory: Breathing is regular & unlabored while sitting.     HEENT: Hearing is intact to spoken word.  Speech is clear.  No gross evidence of visual impairment that would impact ambulation.     Vascular: DP & PT pulses are intact & regular, CFT immediate, positive digital hair growth bilaterally.  No significant edema or varicosities noted and skin temperature is normal to both lower extremities.     Neurologic: Lower extremity sensation is intact to light touch.  No evidence of weakness or contracture in the lower extremities.  No evidence of neuropathy.    Dermatologic: Adequate texture, turgor and tone about the integument.  No discoloration or thickening of the toenail however the right lateral hallux nail border(s) are ingrown with localized erythema, discomfort and purulent drainage.     Musculoskeletal: Patient is ambulatory without assistive device or brace.  No gross ankle deformity noted.  No foot or ankle joint effusion is noted.     ASSESSMENT:       ICD-10-CM    1. Ingrowing nail  L60.0 REMOVAL NAIL/NAIL BED, PARTIAL OR COMPLETE          Plan:   6/3/2024  We reviewed medical history and EPIC chart.  After obtaining informed consent to permanently remove the right lateral hallux nail(s), I " utilized 3 cc of lidocaine plain to achieve local anesthesia per digit.  The toenails were then prepped with Betadine in usual fashion.  A quarter inch Penrose drain was then utilized for hemostasis.  100% of the toenail border was avulsed utilizing a nail elevator, english anvil, 6100 blade and hemostat.  The proximal root portion of the nail was confirmed to be removed atraumatically.  Three applications of 89% phenol were applied to the surgical site for 30 seconds each followed by a curette after each application.  Surgical site was then flushed with alcohol and dressed with bacitracin and a nonadherent compression dressing.  Tourniquet was removed after approximately 3 minutes followed by immediate hyperemia to the distal aspect of the hallux.  Written postoperative instructions were dispensed and patient instructed to follow up in 1-2 weeks with any questions, pain,drainage, delayed healing or concerns.  I answered all questions to patients satisfaction.     If patient calls in the next 1-3 weeks with continued redness, pain or drainage I would recommend beginning oral Keflex 500 mg, 4 times a day ×10 days, after confirming there is no allergy.      Zackery Pendleton DPM      Again, thank you for allowing me to participate in the care of your patient.        Sincerely,        Zackery Pendleton DPM

## 2024-06-03 NOTE — PROGRESS NOTES
HPI:  Shayy Ndiaye is a 43 year old female who is seen in consultation at the request of self    Pt presents for eval of:   (Onset, Location, L/R, Character, Treatments, Injury if yes)    Injury to Right great toe 8/2016 with Right great toenail avulsion.   6/5/2023, avulsion Right great toenail by Ilya Ayon DPM  Onset 5/29/2024, ingrown lateral Right great toenail, redness and tenderness distal Right great toe with drainage.    Soaking with epsom salt.     Works as a  from home.    Review of Systems:  Patient denies fever, chills, rash, wound, stiffness, limping, numbness, weakness, heart burn, blood in stool, chest pain with activity, calf pain when walking, shortness of breath with activity, chronic cough, easy bleeding/bruising, swelling of ankles, excessive thirst, fatigue, depression, anxiety.  Pt admits to the symptoms noted in history above.     PAST MEDICAL HISTORY:   Past Medical History:   Diagnosis Date    Anxiety     EIN (endometrial intraepithelial neoplasia)     Endometriosis     Motion sickness         PAST SURGICAL HISTORY:   Past Surgical History:   Procedure Laterality Date    COLONOSCOPY N/A 9/20/2023    Procedure: COLONOSCOPY, FLEXIBLE, WITH LESION REMOVAL USING SNARE;  Surgeon: Wei Rice MD;  Location:  GI    D & C  1999    elected AB    DILATION AND CURETTAGE, HYSTEROSCOPY DIAGNOSTIC, COMBINED N/A 10/6/2020    Procedure: HYSTEROSCOPY, DILATATION CURETTAGE;  Surgeon: Mino Urias MD;  Location:  OR    HC TOOTH EXTRACTION W/FORCEP      HYSTERECTOMY, TOTAL, ABDOMINAL, WITH SALPINGO-OOPHORECTOMY, CYSTOSCOPY Bilateral 10/4/2023    Procedure: MODIFIED RAICAL TOTAL ABDOMINAL HYSTERECTOMY, LEFT SALPINGO-OOPHORECTOMY, RIGHT SALPINGECTOMY, CYSTOSCOPY, PELVIC WASHINGS;  Surgeon: Radha Perez MD;  Location:  OR    LAPAROSCOPIC LYSIS ADHESIONS N/A 10/6/2020    Procedure: LYSIS OF ADHESIONS;  Surgeon: Mino Urias MD;  Location:  OR    LAPAROSCOPY  DIAGNOSTIC (GYN)  05/2008    endo    LAPAROSCOPY DIAGNOSTIC (GYN)  09/2008    endo    LAPAROSCOPY DIAGNOSTIC (GYN) Bilateral 10/4/2023    Procedure: DIAGNOSTIC LAPAROSCOPY;  Surgeon: Radha Perez MD;  Location:  OR    LAPAROTOMY, LYSIS ADHESIONS, COMBINED N/A 10/4/2023    Procedure: EXPLORATORY LAPAROTOMY, LYSIS OF ADHESIONS FOR GREATER THAN AN HOUR;  Surgeon: Radha Perez MD;  Location:  OR    LASER CO2 LAPAROSCOPIC VAPORIZATION ENDOMETRIUM N/A 10/6/2020    Procedure: LAPAROSCOPY WITH CO2 LASER, LASER VAPORIZATION OF ENDOMETRIOSIS;  Surgeon: Mino Urias MD;  Location:  OR    TONSILLECTOMY & ADENOIDECTOMY          MEDICATIONS:   Current Outpatient Medications:     magnesium 250 MG tablet, , Disp: , Rfl:     metFORMIN (GLUCOPHAGE XR) 500 MG 24 hr tablet, Take 1 tablet (500 mg) by mouth daily (with breakfast) for 14 days, THEN 2 tablets (1,000 mg) daily (with breakfast) for 90 days., Disp: 194 tablet, Rfl: 0    Vitamin D, Cholecalciferol, 10 MCG (400 UNIT) TABS, , Disp: , Rfl:     senna-docusate (SENOKOT-S/PERICOLACE) 8.6-50 MG tablet, Take 1 tablet by mouth 2 times daily as needed for constipation, Disp: 60 tablet, Rfl: 0     ALLERGIES:    Allergies   Allergen Reactions    Dust Mite Extract      STUFFY NOSE    Erythromycin     Minocycline Unknown and Nausea        SOCIAL HISTORY:   Social History     Socioeconomic History    Marital status: Single     Spouse name: Not on file    Number of children: Not on file    Years of education: Not on file    Highest education level: Not on file   Occupational History    Not on file   Tobacco Use    Smoking status: Never    Smokeless tobacco: Never   Substance and Sexual Activity    Alcohol use: Not Currently     Comment: Occassional alcohol abuse but now sober    Drug use: No    Sexual activity: Not Currently     Partners: Male     Birth control/protection: None   Other Topics Concern    Parent/sibling w/ CABG, MI or angioplasty before 65F  55M? Not Asked   Social History Narrative    Not on file     Social Determinants of Health     Financial Resource Strain: Low Risk  (2/4/2024)    Financial Resource Strain     Within the past 12 months, have you or your family members you live with been unable to get utilities (heat, electricity) when it was really needed?: No   Food Insecurity: Low Risk  (2/4/2024)    Food Insecurity     Within the past 12 months, did you worry that your food would run out before you got money to buy more?: No     Within the past 12 months, did the food you bought just not last and you didn t have money to get more?: No   Transportation Needs: Low Risk  (2/4/2024)    Transportation Needs     Within the past 12 months, has lack of transportation kept you from medical appointments, getting your medicines, non-medical meetings or appointments, work, or from getting things that you need?: No   Physical Activity: Not on file   Stress: Not on file   Social Connections: Not on file   Interpersonal Safety: Not At Risk (9/12/2023)    Humiliation, Afraid, Rape, and Kick questionnaire     Fear of Current or Ex-Partner: No     Emotionally Abused: No     Physically Abused: No     Sexually Abused: No   Housing Stability: Low Risk  (2/4/2024)    Housing Stability     Do you have housing? : Yes     Are you worried about losing your housing?: No        FAMILY HISTORY:   Family History   Problem Relation Age of Onset    Hyperlipidemia Mother     Breast Cancer Mother     Diabetes Father     No Known Problems Sister     No Known Problems Brother     No Known Problems Maternal Grandmother     No Known Problems Maternal Grandfather     Bone Cancer Paternal Grandmother     No Known Problems Paternal Grandfather     Breast Cancer Maternal Aunt     Cancer Maternal Uncle         head and neck    Diabetes Paternal Aunt     No Known Problems Other     Deep Vein Thrombosis (DVT) No family hx of         EXAM:Vitals: /82 (BP Location: Left arm, Patient  "Position: Sitting, Cuff Size: Adult Large)   Ht 1.748 m (5' 8.8\")   Wt 92.5 kg (204 lb)   LMP 09/23/2023   BMI 30.30 kg/m    BMI= Body mass index is 30.3 kg/m .    General appearance: Patient is alert and fully cooperative with history & exam.  No sign of distress is noted during the visit.     Psychiatric: Affect is pleasant & appropriate.  Patient appears motivated to improve health.     Respiratory: Breathing is regular & unlabored while sitting.     HEENT: Hearing is intact to spoken word.  Speech is clear.  No gross evidence of visual impairment that would impact ambulation.     Vascular: DP & PT pulses are intact & regular, CFT immediate, positive digital hair growth bilaterally.  No significant edema or varicosities noted and skin temperature is normal to both lower extremities.     Neurologic: Lower extremity sensation is intact to light touch.  No evidence of weakness or contracture in the lower extremities.  No evidence of neuropathy.    Dermatologic: Adequate texture, turgor and tone about the integument.  No discoloration or thickening of the toenail however the right lateral hallux nail border(s) are ingrown with localized erythema, discomfort and purulent drainage.     Musculoskeletal: Patient is ambulatory without assistive device or brace.  No gross ankle deformity noted.  No foot or ankle joint effusion is noted.     ASSESSMENT:       ICD-10-CM    1. Ingrowing nail  L60.0 REMOVAL NAIL/NAIL BED, PARTIAL OR COMPLETE          Plan:   6/3/2024  We reviewed medical history and EPIC chart.  After obtaining informed consent to permanently remove the right lateral hallux nail(s), I utilized 3 cc of lidocaine plain to achieve local anesthesia per digit.  The toenails were then prepped with Betadine in usual fashion.  A quarter inch Penrose drain was then utilized for hemostasis.  100% of the toenail border was avulsed utilizing a nail elevator, english anvil, 6100 blade and hemostat.  The proximal root " portion of the nail was confirmed to be removed atraumatically.  Three applications of 89% phenol were applied to the surgical site for 30 seconds each followed by a curette after each application.  Surgical site was then flushed with alcohol and dressed with bacitracin and a nonadherent compression dressing.  Tourniquet was removed after approximately 3 minutes followed by immediate hyperemia to the distal aspect of the hallux.  Written postoperative instructions were dispensed and patient instructed to follow up in 1-2 weeks with any questions, pain,drainage, delayed healing or concerns.  I answered all questions to patients satisfaction.     If patient calls in the next 1-3 weeks with continued redness, pain or drainage I would recommend beginning oral Keflex 500 mg, 4 times a day ×10 days, after confirming there is no allergy.      Zackery Pendleton DPM

## 2024-06-06 ENCOUNTER — PATIENT OUTREACH (OUTPATIENT)
Dept: CARE COORDINATION | Facility: CLINIC | Age: 44
End: 2024-06-06
Payer: COMMERCIAL

## 2024-07-11 ENCOUNTER — MYC MEDICAL ADVICE (OUTPATIENT)
Dept: FAMILY MEDICINE | Facility: CLINIC | Age: 44
End: 2024-07-11
Payer: COMMERCIAL

## 2024-07-11 NOTE — TELEPHONE ENCOUNTER
Triage Patient Outreach    Attempt # 1    Was call answered?  No.  Left voicemail to return call to Triage at [facility]    Also replying to  to discuss scheduling    Eileen Fischer RN

## 2024-08-05 ENCOUNTER — VIRTUAL VISIT (OUTPATIENT)
Dept: FAMILY MEDICINE | Facility: CLINIC | Age: 44
End: 2024-08-05
Payer: COMMERCIAL

## 2024-08-05 DIAGNOSIS — R73.01 IFG (IMPAIRED FASTING GLUCOSE): ICD-10-CM

## 2024-08-05 DIAGNOSIS — Z02.89 ENCOUNTER FOR COMPLETION OF FORM WITH PATIENT: Primary | ICD-10-CM

## 2024-08-05 DIAGNOSIS — F41.9 ANXIETY: ICD-10-CM

## 2024-08-05 DIAGNOSIS — K76.0 HEPATIC STEATOSIS: ICD-10-CM

## 2024-08-05 DIAGNOSIS — Z63.6 CAREGIVER BURDEN: ICD-10-CM

## 2024-08-05 PROCEDURE — 99213 OFFICE O/P EST LOW 20 MIN: CPT | Mod: 95 | Performed by: PHYSICIAN ASSISTANT

## 2024-08-05 SDOH — SOCIAL STABILITY - SOCIAL INSECURITY: DEPENDENT RELATIVE NEEDING CARE AT HOME: Z63.6

## 2024-08-05 NOTE — PROGRESS NOTES
Shayy is a 44 year old who is being evaluated via a billable video visit.          Assessment & Plan       ICD-10-CM    1. Encounter for completion of form with patient  Z02.89       2. Caregiver burden  Z63.6       3. Anxiety  F41.9         Form completed and faxed to employer. Recommend continued remote work accommodation for patient's mental health and physical health of those she cares for.    Subjective   Shayy is a 44 year old, presenting for the following health issues:  Forms      8/5/2024     3:49 PM   Additional Questions   Roomed by Norma DE JESUS         8/5/2024   Forms   Any forms needing to be completed Yes        History of Present Illness       Reason for visit:  Employer forms    She eats 2-3 servings of fruits and vegetables daily.She consumes 0 sweetened beverage(s) daily.She exercises with enough effort to increase her heart rate 20 to 29 minutes per day.  She exercises with enough effort to increase her heart rate 7 days per week.   She is taking medications regularly.     - Patient has been working remotely at her same position for >2 years without issue. 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 Needs renewal of accommodation to work remotely.      Review of Systems  Constitutional, HEENT, cardiovascular, pulmonary, GI, , musculoskeletal, neuro, skin, endocrine and psych systems are negative, except as otherwise noted.      Objective           Vitals:  No vitals were obtained today due to virtual visit.    Physical Exam   GENERAL: alert and no distress  EYES: Eyes grossly normal to inspection.  No discharge or erythema, or obvious scleral/conjunctival abnormalities.  HENT: Normal cephalic/atraumatic.  External ears, nose and mouth without ulcers or lesions.  No nasal drainage visible.  NECK: No asymmetry, visible masses or scars  RESP: No audible wheeze, cough, or  visible cyanosis.    MS: No gross musculoskeletal defects noted.  Normal range of motion.  No visible edema.  SKIN: Visible skin clear. No significant rash, abnormal pigmentation or lesions.  PSYCH: Appropriate affect, tone, and pace of words          Video-Visit Details    Type of service:  Video Visit   Originating Location (pt. Location): Home    Distant Location (provider location):  On-site  Platform used for Video Visit: Bekah  Signed Electronically by: Yin Broderick PA-C

## 2024-08-05 NOTE — TELEPHONE ENCOUNTER
Clinic RN: Please contact patient because the last prescription was a taper dose (not in RN protocol). We need to know what dose patient is taking. Determine the current dose, then route to provider and ask provider to update the SIG and approve or deny the prescription.

## 2024-08-06 RX ORDER — METFORMIN HCL 500 MG
TABLET, EXTENDED RELEASE 24 HR ORAL
Qty: 194 TABLET | Refills: 0 | OUTPATIENT
Start: 2024-08-06

## 2024-08-06 NOTE — TELEPHONE ENCOUNTER
Forms have been faxed to [phone], Attn: Neftali Cooper. [employer] message sent to pt, forms sent to scanning.

## 2024-08-06 NOTE — TELEPHONE ENCOUNTER
Triage Patient Outreach    Attempt # 1    Was call answered?  No.  Left voicemail to return call to [facility] at [facility]    Upon c/b: Looks like this is supposed to last until after upcoming visit. Initial prescription 5/15 with note to follow up in 3 months. Please verify dose pt is taking and route to PCP to review if they want to refill now or wait for visit.     Eileen Fischer RN

## 2024-08-06 NOTE — TELEPHONE ENCOUNTER
Pt calling stating she did not requesting refill and has upcoming appt with PCP and will have enough to get through until then.     Keli Ferris RN

## 2024-08-10 ENCOUNTER — HEALTH MAINTENANCE LETTER (OUTPATIENT)
Age: 44
End: 2024-08-10

## 2024-08-19 ENCOUNTER — VIRTUAL VISIT (OUTPATIENT)
Dept: FAMILY MEDICINE | Facility: CLINIC | Age: 44
End: 2024-08-19
Attending: PHYSICIAN ASSISTANT
Payer: COMMERCIAL

## 2024-08-19 DIAGNOSIS — R91.8 PULMONARY NODULES: ICD-10-CM

## 2024-08-19 DIAGNOSIS — R73.01 IFG (IMPAIRED FASTING GLUCOSE): Primary | ICD-10-CM

## 2024-08-19 DIAGNOSIS — K76.0 HEPATIC STEATOSIS: ICD-10-CM

## 2024-08-19 DIAGNOSIS — M89.9 LESION OF PELVIC BONE: ICD-10-CM

## 2024-08-19 DIAGNOSIS — K76.9 LIVER LESION: ICD-10-CM

## 2024-08-19 PROCEDURE — G2211 COMPLEX E/M VISIT ADD ON: HCPCS | Mod: 95 | Performed by: PHYSICIAN ASSISTANT

## 2024-08-19 PROCEDURE — 99214 OFFICE O/P EST MOD 30 MIN: CPT | Mod: 95 | Performed by: PHYSICIAN ASSISTANT

## 2024-08-19 NOTE — PROGRESS NOTES
Shayy is a 44 year old who is being evaluated via a billable video visit.          Assessment & Plan       ICD-10-CM    1. IFG (impaired fasting glucose)  R73.01 Hemoglobin A1c      2. Hepatic steatosis  K76.0 Comprehensive metabolic panel      3. Liver lesion  K76.9       4. Lesion of pelvic bone  M89.9 CT Pelvis Bone wo Contrast      5. Pulmonary nodules  R91.8 CT Chest w/o Contrast        - Will recheck A1c to ensure metformin has helped with pre-diabetes. Discussed long term aid with preventing progression of hepatic steatosis, so will likely continue even with normal A1c. If A1c remains elevated, will recommend increased dose.   - Reviewed planned imaging and timing for surveillance of liver lesions, pelvic lesion, and pulmonary nodules.     The longitudinal plan of care for the diagnosis(es)/condition(s) as documented were addressed during this visit. Due to the added complexity in care, I will continue to support Shayy in the subsequent management and with ongoing continuity of care.    Subjective   Shayy is a 44 year old, presenting for the following health issues:  Follow Up (Follow up visit 5/15/2024/Requesting Employer forms )        8/19/2024     7:15 AM   Additional Questions   Roomed by Francisco LEAHY         8/19/2024   Forms   Any forms needing to be completed Yes      History of Present Illness       Reason for visit:  Employer forms    She eats 2-3 servings of fruits and vegetables daily.She consumes 0 sweetened beverage(s) daily.She exercises with enough effort to increase her heart rate 20 to 29 minutes per day.  She exercises with enough effort to increase her heart rate 7 days per week.   She is taking medications regularly.     Patient has been on metformin for 3 months, tolerating well. Due for recheck labs.    Planned MRI liver in November for recheck liver lesion. Will recheck chest CT for pulmonary nodule follow-up and pelvic CT for sclerosis seen on previous study; recheck planned for May  2025.      Review of Systems  Constitutional, HEENT, cardiovascular, pulmonary, GI, , musculoskeletal, neuro, skin, endocrine and psych systems are negative, except as otherwise noted.      Objective           Vitals:  No vitals were obtained today due to virtual visit.    Physical Exam   GENERAL: alert and no distress  EYES: Eyes grossly normal to inspection.  No discharge or erythema, or obvious scleral/conjunctival abnormalities.  HENT: Normal cephalic/atraumatic.  External ears, nose and mouth without ulcers or lesions.  No nasal drainage visible.  NECK: No asymmetry, visible masses or scars  RESP: No audible wheeze, cough, or visible cyanosis.    MS: No gross musculoskeletal defects noted.  Normal range of motion.  No visible edema.  SKIN: Visible skin clear. No significant rash, abnormal pigmentation or lesions.  PSYCH: Appropriate affect, tone, and pace of words          Video-Visit Details    Type of service:  Video Visit   Originating Location (pt. Location): Home    Distant Location (provider location):  Off-site  Platform used for Video Visit: Bekah  Signed Electronically by: Yin Broderick PA-C

## 2024-08-22 ENCOUNTER — LAB (OUTPATIENT)
Dept: LAB | Facility: CLINIC | Age: 44
End: 2024-08-22
Payer: COMMERCIAL

## 2024-08-22 DIAGNOSIS — K76.0 HEPATIC STEATOSIS: ICD-10-CM

## 2024-08-22 DIAGNOSIS — R73.01 IFG (IMPAIRED FASTING GLUCOSE): ICD-10-CM

## 2024-08-22 LAB
ALBUMIN SERPL BCG-MCNC: 4.8 G/DL (ref 3.5–5.2)
ALP SERPL-CCNC: 93 U/L (ref 40–150)
ALT SERPL W P-5'-P-CCNC: 9 U/L (ref 0–50)
ANION GAP SERPL CALCULATED.3IONS-SCNC: 12 MMOL/L (ref 7–15)
AST SERPL W P-5'-P-CCNC: 19 U/L (ref 0–45)
BILIRUB SERPL-MCNC: 0.2 MG/DL
BUN SERPL-MCNC: 10.5 MG/DL (ref 6–20)
CALCIUM SERPL-MCNC: 10 MG/DL (ref 8.8–10.4)
CHLORIDE SERPL-SCNC: 106 MMOL/L (ref 98–107)
CREAT SERPL-MCNC: 0.62 MG/DL (ref 0.51–0.95)
EGFRCR SERPLBLD CKD-EPI 2021: >90 ML/MIN/1.73M2
GLUCOSE SERPL-MCNC: 91 MG/DL (ref 70–99)
HBA1C MFR BLD: 5.8 % (ref 0–5.6)
HCO3 SERPL-SCNC: 22 MMOL/L (ref 22–29)
POTASSIUM SERPL-SCNC: 3.8 MMOL/L (ref 3.4–5.3)
PROT SERPL-MCNC: 8.2 G/DL (ref 6.4–8.3)
SODIUM SERPL-SCNC: 140 MMOL/L (ref 135–145)

## 2024-08-22 PROCEDURE — 80053 COMPREHEN METABOLIC PANEL: CPT

## 2024-08-22 PROCEDURE — 83036 HEMOGLOBIN GLYCOSYLATED A1C: CPT

## 2024-08-22 PROCEDURE — 36415 COLL VENOUS BLD VENIPUNCTURE: CPT

## 2024-08-23 ENCOUNTER — MYC MEDICAL ADVICE (OUTPATIENT)
Dept: FAMILY MEDICINE | Facility: CLINIC | Age: 44
End: 2024-08-23
Payer: COMMERCIAL

## 2024-08-23 DIAGNOSIS — R73.01 IFG (IMPAIRED FASTING GLUCOSE): Primary | ICD-10-CM

## 2024-08-23 RX ORDER — METFORMIN HCL 500 MG
1000 TABLET, EXTENDED RELEASE 24 HR ORAL DAILY
Qty: 180 TABLET | Refills: 1 | Status: SHIPPED | OUTPATIENT
Start: 2024-08-23

## 2024-08-23 NOTE — TELEPHONE ENCOUNTER
Triage Patient Outreach    Attempt # 1    Was call answered?  No.  Unable to leave message, recall needed.      Sent back the patient a my chart response.       Maura Carlisle RN

## 2024-08-23 NOTE — TELEPHONE ENCOUNTER
Please call and ask patient if she is currently taking 1 tablet of metformin as it is reported in her chart or she is taking 2 tablets of metformin total of 1000 mg.  Let us know we will send updated medication.

## 2024-09-09 ENCOUNTER — TELEPHONE (OUTPATIENT)
Dept: FAMILY MEDICINE | Facility: CLINIC | Age: 44
End: 2024-09-09
Payer: COMMERCIAL

## 2024-09-09 NOTE — TELEPHONE ENCOUNTER
FYI - Status Update    Who is Calling: Soha from [employer]    Update: Faxed over Clarifying questions, when completed we can fax or email it needing to be completed by September 19th, 2024.

## 2024-09-11 ENCOUNTER — TELEPHONE (OUTPATIENT)
Dept: FAMILY MEDICINE | Facility: CLINIC | Age: 44
End: 2024-09-11
Payer: COMMERCIAL

## 2024-09-11 NOTE — TELEPHONE ENCOUNTER
Forms/Letter Request    Type of form/letter: Patient Safety and Health Consideration form    Do we have the form/letter: Yes, red folder, Dr. Buckley's inbox.    Who is the form from? [employer]    Where did/will the form come from? form was faxed in    When is form/letter needed by: As able    How would you like the form/letter returned:  HADLEY Asencio on 9/11/2024 at 3:39 PM

## 2024-09-17 ENCOUNTER — TELEPHONE (OUTPATIENT)
Dept: FAMILY MEDICINE | Facility: CLINIC | Age: 44
End: 2024-09-17
Payer: COMMERCIAL

## 2024-09-17 NOTE — TELEPHONE ENCOUNTER
Forms/Letter Request    Type of form/letter: Healthcare Certification- Clarifying Questions     Do we have the form/letter: Yes: Red folder placed in Yin Broderick's inbox        How would you like the form/letter returned: Fax : [phone]

## 2024-09-17 NOTE — LETTER
"September 18, 2024      Shayy Ndiaye  [address] [facility] [address]        To Whom It May Concern,     Ms. Ndiaye is a patient under my care. 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 As such, they are at high risk for severe infection, particularly from COVID-19 and other respiratory illnesses (influenza, RSV, common cold, etc). Severe infections can lead to hospitalization and even death. Due to her close proximity to these persons and her role at caregiver, it is medically necessary for her to limit close contact with others outside her home to prevent transmission of infection. Ms. Ndiaye understandably has high anxiety around potential for transmission to the vulnerable people under her care. In order to limit transmission of respiratory illnesses, all persons in the workplace would need to wear a high-filtration mask at all times, the office would need to be equipped with ventilation with high filtration and air exchange rate, and all persons would need to verify they are symptom free and have not been recently exposed to illness in order to minimize risk of transmission for Ms Ndiaye. Ms Ndiaye's anxiety is always \"active\" while in the workplace as these accommodations are not in place. Per [place] law, reasonable accommodation is required of employers in cases of caregiver or patient health concerns. Given the time, expense, and imposition on Ms Ndiaye co-workers based on the above accommodations, the most reasonable work accommodation is to allow Ms Ndiaye to work remotely at all times. Please reach out if you need further clarification.      Sincerely,        Yin Broderick PA-C          "

## 2024-09-18 NOTE — TELEPHONE ENCOUNTER
Letter faxed to fax # [phone], attn: Neftali Cooper. Fax confirmation jesus.    Ingrid Asencio on 9/18/2024 at 11:29 AM

## 2024-09-23 ENCOUNTER — HOSPITAL ENCOUNTER (OUTPATIENT)
Dept: MAMMOGRAPHY | Facility: CLINIC | Age: 44
Discharge: HOME | End: 2024-09-23
Attending: PHYSICIAN ASSISTANT | Admitting: PHYSICIAN ASSISTANT
Payer: COMMERCIAL

## 2024-09-23 DIAGNOSIS — Z12.31 VISIT FOR SCREENING MAMMOGRAM: ICD-10-CM

## 2024-09-23 PROCEDURE — 77063 BREAST TOMOSYNTHESIS BI: CPT

## 2024-11-06 ENCOUNTER — ANCILLARY PROCEDURE (OUTPATIENT)
Dept: MRI IMAGING | Facility: CLINIC | Age: 44
End: 2024-11-06
Attending: PHYSICIAN ASSISTANT
Payer: COMMERCIAL

## 2024-11-06 DIAGNOSIS — K76.9 LIVER LESION: ICD-10-CM

## 2024-11-06 PROCEDURE — A9581 GADOXETATE DISODIUM INJ: HCPCS | Performed by: RADIOLOGY

## 2024-11-06 PROCEDURE — 74183 MRI ABD W/O CNTR FLWD CNTR: CPT | Mod: GC | Performed by: RADIOLOGY

## 2024-12-12 ENCOUNTER — PATIENT OUTREACH (OUTPATIENT)
Dept: CARE COORDINATION | Facility: CLINIC | Age: 44
End: 2024-12-12
Payer: COMMERCIAL

## 2025-01-07 ENCOUNTER — TELEPHONE (OUTPATIENT)
Dept: FAMILY MEDICINE | Facility: CLINIC | Age: 45
End: 2025-01-07
Payer: COMMERCIAL

## 2025-01-07 NOTE — TELEPHONE ENCOUNTER
She has more information and she asked her employer about the form your requesting, she wanted to talk to you about it more.     What would be the best to do here, should she talk to the nurses about this or do a telephone visit with you?        Will give the patient a call back to let her know the answer.

## 2025-01-07 NOTE — TELEPHONE ENCOUNTER
Patient Returning Call    Reason for call:  FORMS FROM EMPLOYER FOR PCP TO FILL OUT   Information relayed to patient:  N/A    Patient has additional questions:  No    What are your questions/concerns:  FORMS FROM EMPLOYER FOR PCP TO FILL OUT     Could we send this information to you in [employer] or would you prefer to receive a phone call?:   Patient would prefer a phone call   Okay to leave a detailed message?: Yes at Cell number on file:    Telephone Information:   Mobile [phone]

## 2025-01-08 NOTE — TELEPHONE ENCOUNTER
Spoke with patient, she is heading into a meeting now, she will schedule, but if she can't find something soon, she will give me a call back

## 2025-03-07 ENCOUNTER — TELEPHONE (OUTPATIENT)
Dept: VASCULAR SURGERY | Facility: CLINIC | Age: 45
End: 2025-03-07
Payer: COMMERCIAL

## 2025-03-07 NOTE — TELEPHONE ENCOUNTER
Referred by Yin Broderick PA-C for   Venous stasis of both lower extremities   Prolonged capillary refill time   Bilateral cold feet     Previous imaging completed (pertinent to referral):  none    Routing to scheduling to coordinate the following:  NEW VASCULAR PATIENT consult with Vascular Medicine  Please schedule this at next available    Appt note:  Ref by Yin Broderick PA-C for venous stasis of BLE, prolonged cap refill, bilateral cold feet; notes in Epic.    Dian Ennis, TAMELAN, RN, CV-BC, CNOR  [facility] [place]

## 2025-03-07 NOTE — TELEPHONE ENCOUNTER
Called and LVM for patient to let her know that I am cancelling her Monday, March 10 appt with Dr. Sosa. Left phone number for [facility] so that she can call to reschedule.

## 2025-03-07 NOTE — TELEPHONE ENCOUNTER
"This patient was referred to Vascular Medicine, but it was sent to Vein scheduling. She is currently scheduled on Monday, March 10 with Dr. Sosa in [place]. Looking over her diagnoses, it seems like she should be seen at Vascular, not veins. Can you please review and let me know? Thank you!    \"Consult for earnestine venous statsis, prolonged capillary refill time, bilateral cold feet. No VV. Patient has spider veins and heaviness. Ref by MARK YUAN\"     Jennifer Reddy,   [facility]    "

## 2025-03-11 NOTE — TELEPHONE ENCOUNTER
Left voicemail for patient to schedule appointment(s), stated this our 2nd attempt at scheduling and provided [facility] telephone number for patient to call back to schedule.

## 2025-03-26 ENCOUNTER — OFFICE VISIT (OUTPATIENT)
Dept: OTHER | Facility: CLINIC | Age: 45
End: 2025-03-26
Attending: INTERNAL MEDICINE
Payer: COMMERCIAL

## 2025-03-26 ENCOUNTER — TELEPHONE (OUTPATIENT)
Dept: OTHER | Facility: CLINIC | Age: 45
End: 2025-03-26

## 2025-03-26 VITALS
HEART RATE: 99 BPM | OXYGEN SATURATION: 99 % | BODY MASS INDEX: 28.04 KG/M2 | WEIGHT: 185 LBS | SYSTOLIC BLOOD PRESSURE: 119 MMHG | DIASTOLIC BLOOD PRESSURE: 79 MMHG

## 2025-03-26 DIAGNOSIS — I73.00 RAYNAUD'S DISEASE WITHOUT GANGRENE: Primary | ICD-10-CM

## 2025-03-26 DIAGNOSIS — I83.93 SPIDER VEINS OF BOTH LOWER EXTREMITIES: ICD-10-CM

## 2025-03-26 DIAGNOSIS — M79.89 LEG SWELLING: ICD-10-CM

## 2025-03-26 DIAGNOSIS — I87.8 VENOUS STASIS OF BOTH LOWER EXTREMITIES: ICD-10-CM

## 2025-03-26 DIAGNOSIS — K76.0 HEPATIC STEATOSIS: ICD-10-CM

## 2025-03-26 DIAGNOSIS — R73.01 IFG (IMPAIRED FASTING GLUCOSE): ICD-10-CM

## 2025-03-26 PROCEDURE — 99213 OFFICE O/P EST LOW 20 MIN: CPT | Performed by: INTERNAL MEDICINE

## 2025-03-26 PROCEDURE — 3078F DIAST BP <80 MM HG: CPT | Performed by: INTERNAL MEDICINE

## 2025-03-26 PROCEDURE — G2211 COMPLEX E/M VISIT ADD ON: HCPCS | Performed by: INTERNAL MEDICINE

## 2025-03-26 PROCEDURE — 99205 OFFICE O/P NEW HI 60 MIN: CPT | Performed by: INTERNAL MEDICINE

## 2025-03-26 PROCEDURE — 3074F SYST BP LT 130 MM HG: CPT | Performed by: INTERNAL MEDICINE

## 2025-03-26 RX ORDER — NITROGLYCERIN 20 MG/G
1 OINTMENT TOPICAL EVERY 24 HOURS
Qty: 30 G | Refills: 3 | Status: SHIPPED | OUTPATIENT
Start: 2025-03-26

## 2025-03-26 RX ORDER — AMLODIPINE BESYLATE 2.5 MG/1
2.5 TABLET ORAL DAILY
Qty: 30 TABLET | Refills: 5 | Status: SHIPPED | OUTPATIENT
Start: 2025-03-26

## 2025-03-26 NOTE — PATIENT INSTRUCTIONS
Raynaud Phenomenon     What is Raynaud phenomenon?   Raynaud phenomenon (RP) is a name given to episodes of color changes (usually pale or blue) in the hands and feet in response to the cold or emotional stress. The episodes usually come on suddenly and last minutes to hours. At first it might affect only one finger or toe, but may, over time, spread to other fingers and toes. There is sometimes a clear line at which the color changes from normal to discolored. It may affect both hands equally and may cause numbness, tingling, or an aching pain.  Sometimes the arms or legs may get mottled, and it can also affect other areas of the body, such as the ears, nose, and face. Upon warming, the hands and feet usually become red or flushed and they may feel swollen or itchy.     Who gets it?   RP is fairly common, especially in regions with colder climates. It affects girls somewhat more often than boys.  It often runs in families.      Most people with RP do not have an underlying rheumatic disease and will not go on to develop one. RP can be associated with rheumatic diseases including lupus, systemic sclerosis, and mixed connective tissue disease (MCTD), however these patients will have additional signs and symptoms of those diseases.     What causes it?   Normally, blood vessels constrict in response to cold temperatures in order to keep the body warm. It is thought that in RP, there is over-activation of blood vessel constriction in response to cold (and stress and exercise). This constriction leads to limited blood supply to the skin resulting in paleness. The blue color results because the tissue in the hands and feet are extracting so much oxygen from the slow-flowing blood. The fingers and toes appear red when re-warmed because of exaggerated blood flow through the blood vessels. These episodes of decreased and increased blood flow are mainly a nuisance, and do not (with rare exceptions) damage the fingers or  toes.    How is it diagnosed?   RP is diagnosed based upon the story and exam by your doctor. There are no simple tests to help diagnose RP. Your doctor would ask questions to be sure there is no associated rheumatic disease, do a general physical examination, and carefully look at your nailfold capillaries with a magnifier in the office.  Abnormalities in the nailfold capillaries may indicate an underlying rheumatic disease. Lab testing, such as an AMILCAR, is not routinely necessary unless there are other reasons to suspect a rheumatic disease.     How is it treated?  Mild RP can be treated with simple measures, such as dressing warmly (for example wearing mittens, long underwear, and feet warmers), avoiding sudden cold exposures, minimizing emotional stress, and avoiding other aggravating factors (such as cigarette smoke, and stimulants such as decongestants and diet pills).     Children can learn through biofeedback training how to increase the blood flow to their fingers and toes and increase their skin temperatures. There are several medications that can be helpful, and the most commonly used medication is a calcium-channel blocker called nifedipine or amlodipine     Consider getting Bioflect or CzSalus K2 compression pants on [employer]   20-30 mm Hg knee/thigh high compression stockings DME Rx given   Take amlodipine in AM   Use nitroglycerin ointment at night time   Thermal protections   Go for labs  order placed   Video or office visit in a month

## 2025-03-26 NOTE — TELEPHONE ENCOUNTER
Routing to scheduling to coordinate the following:    Non-fasting labs- next available  Virtual follow up 1 month     Appt note: Follow up to 3/26/25    Sandi PARSONS, RN    [facility]  Office: [phone]  Fax: [phone]

## 2025-03-26 NOTE — PROGRESS NOTES
[facility]        Patient is here for a consult.    Pt is currently taking no meds that would impact our treatment plan.    /79 (BP Location: Left arm, Patient Position: Chair, Cuff Size: Adult Regular)   Pulse 99   Wt 185 lb (83.9 kg)   LMP 09/23/2023   SpO2 99%   BMI 28.04 kg/m      The provider has been notified that the patient has no concerns.     Questions patient would like addressed today are: N/A.    Refills are needed: N/A    Has homecare services and agency name:  [facility]

## 2025-03-26 NOTE — PROGRESS NOTES
[facility] INITIAL VASCULAR MEDICINE CONSULT    ( New patient Visit)     PRIMARY HEALTH CARE PROVIDER:  Yin Broderick PA-C      REFERRING HEALTH CARE PROVIDER;  Yin Broderick PA-C      REASON FOR CONSULT: Evaluation and management of vascular causes of bilateral cold feet with discoloration turning into bluish purplish reddish and also leg swelling end of the day with heaviness and fullness      HPI: Shayy Ndiaye is a 44 year old very pleasant female with history of endometriosis underwent hormonal therapy for many years, impaired fasting glucose on metformin, no history of smoking dealing with bilateral lower extremity heaviness, fullness, end of the day lower leg swelling and also for more than a year she is dealing with cold toes and fingers but toes are worse than fingers turning into bluish purplish reddish and whitish.  She has a minimal spider veins in both legs no previous intervention no history of DVT or PE currently not on any hormonal replacement therapy or birth control pills etc..  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 30 tablet 5    magnesium 250 MG tablet       metFORMIN (GLUCOPHAGE XR) 500 MG 24 hr tablet Take 2 tablets (1,000 mg) by mouth daily. 180 tablet 0    nitroGLYcerin (NITRO-BID) 2 % OINT ointment Place 1 inch (15 mg) over 12 hours onto the skin every 24 hours. Apply to affected area ( toes or fingers)  at night and avoid touching eyes or face . 30 g 3    Vitamin D, Cholecalciferol, 10 MCG (400  UNIT) TABS        No current facility-administered medications for this visit.       PAST SURGICAL HISTORY:  Past Surgical History:   Procedure Laterality Date    COLONOSCOPY N/A 09/20/2023    Procedure: COLONOSCOPY, FLEXIBLE, WITH LESION REMOVAL USING SNARE;  Surgeon: Wei Rice MD;  Location:  GI    D & C  01/01/1999    elected AB    DILATION AND CURETTAGE, HYSTEROSCOPY DIAGNOSTIC, COMBINED N/A 10/06/2020    Procedure: HYSTEROSCOPY, DILATATION CURETTAGE;  Surgeon: Mino Urias MD;  Location:  OR    HC TOOTH EXTRACTION W/FORCEP      HYSTERECTOMY, TOTAL, ABDOMINAL, WITH SALPINGO-OOPHORECTOMY, CYSTOSCOPY Bilateral 10/04/2023    Procedure: MODIFIED RAICAL TOTAL ABDOMINAL HYSTERECTOMY, LEFT SALPINGO-OOPHORECTOMY, RIGHT SALPINGECTOMY, CYSTOSCOPY, PELVIC WASHINGS;  Surgeon: Radha Perez MD;  Location:  OR    LAPAROSCOPIC LYSIS ADHESIONS N/A 10/06/2020    Procedure: LYSIS OF ADHESIONS;  Surgeon: Mino Urias MD;  Location:  OR    LAPAROSCOPY DIAGNOSTIC (GYN)  05/01/2008    endo    LAPAROSCOPY DIAGNOSTIC (GYN)  09/01/2008    endo    LAPAROSCOPY DIAGNOSTIC (GYN) Bilateral 10/04/2023    Procedure: DIAGNOSTIC LAPAROSCOPY;  Surgeon: Radha Perez MD;  Location:  OR    LAPAROTOMY, LYSIS ADHESIONS, COMBINED N/A 10/04/2023    Procedure: EXPLORATORY LAPAROTOMY, LYSIS OF ADHESIONS FOR GREATER THAN AN HOUR;  Surgeon: Radha Perez MD;  Location:  OR    LASER CO2 LAPAROSCOPIC VAPORIZATION ENDOMETRIUM N/A 10/06/2020    Procedure: LAPAROSCOPY WITH CO2 LASER, LASER VAPORIZATION OF ENDOMETRIOSIS;  Surgeon: Mino Urias MD;  Location:  OR    TONSILLECTOMY & ADENOIDECTOMY         ALLERGIES     Allergies   Allergen Reactions    Dust Mite Extract      STUFFY NOSE    Erythromycin     Minocycline Unknown and Nausea       FAMILY HISTORY  Family History   Problem Relation Age of Onset    Hyperlipidemia Mother     Breast Cancer Mother     Diabetes Father     No Known  Problems Sister     Anesthesia Reaction Brother     Asthma Brother     Cerebrovascular Disease Maternal Grandmother     Osteoporosis Maternal Grandmother     Coronary Artery Disease Maternal Grandfather     Hyperlipidemia Maternal Grandfather     Prostate Cancer Maternal Grandfather     Bone Cancer Paternal Grandmother     Other Cancer Paternal Grandmother     No Known Problems Paternal Grandfather     Breast Cancer Maternal Aunt     Cancer Maternal Uncle         head and neck    Diabetes Paternal Aunt     No Known Problems Other     Deep Vein Thrombosis (DVT) No family hx of          SOCIAL HISTORY  Social History     Socioeconomic History    Marital status: Single     Spouse name: Not on file    Number of children: Not on file    Years of education: Not on file    Highest education level: Not on file   Occupational History    Not on file   Tobacco Use    Smoking status: Never    Smokeless tobacco: Never   Vaping Use    Vaping status: Never Used   Substance and Sexual Activity    Alcohol use: Not Currently     Comment: Occassional alcohol abuse but now sober    Drug use: No    Sexual activity: Not Currently     Partners: Male     Birth control/protection: None   Other Topics Concern    Parent/sibling w/ CABG, MI or angioplasty before 65F 55M? No   Social History Narrative    Not on file     Social Drivers of Health     Financial Resource Strain: Low Risk  (2/27/2025)    Financial Resource Strain     Within the past 12 months, have you or your family members you live with been unable to get utilities (heat, electricity) when it was really needed?: No   Food Insecurity: Low Risk  (2/27/2025)    Food Insecurity     Within the past 12 months, did you worry that your food would run out before you got money to buy more?: No     Within the past 12 months, did the food you bought just not last and you didn t have money to get more?: No   Transportation Needs: Low Risk  (2/27/2025)    Transportation Needs     Within the  past 12 months, has lack of transportation kept you from medical appointments, getting your medicines, non-medical meetings or appointments, work, or from getting things that you need?: No   Physical Activity: Sufficiently Active (2/27/2025)    Exercise Vital Sign     Days of Exercise per Week: 7 days     Minutes of Exercise per Session: 30 min   Stress: No Stress Concern Present (2/27/2025)    [employer] [place] [employer] - Occupational Stress Questionnaire     Feeling of Stress : Only a little   Social Connections: Unknown (2/27/2025)    Social Connection and Isolation Panel [NHANES]     Frequency of Communication with Friends and Family: Not on file     Frequency of Social Gatherings with Friends and Family: Patient declined     Attends Quaker Services: Not on file     Active Member of Clubs or Organizations: Not on file     Attends Club or Organization Meetings: Not on file     Marital Status: Not on file   Interpersonal Safety: Low Risk  (2/28/2025)    Interpersonal Safety     Do you feel physically and emotionally safe where you currently live?: Yes     Within the past 12 months, have you been hit, slapped, kicked or otherwise physically hurt by someone?: No     Within the past 12 months, have you been humiliated or emotionally abused in other ways by your partner or ex-partner?: No   Housing Stability: Low Risk  (2/27/2025)    Housing Stability     Do you have housing? : Yes     Are you worried about losing your housing?: No       ROS:   General: No change in weight, sleep or appetite.  Normal energy.  No fever or chills  Eyes: Negative for vision changes or eye problems  ENT: No problems with ears, nose or throat.  No difficulty swallowing.  Resp: No coughing, wheezing or shortness of breath  CV: No chest pains or palpitations  GI: No nausea, vomiting,  heartburn, abdominal pain, diarrhea, constipation or change in bowel habits  : No urinary frequency or dysuria, bladder or kidney  problems  Musculoskeletal: No significant muscle or joint pains  Neurologic: No headaches, numbness, tingling, weakness, problems with balance or coordination  Psychiatric: No problems with anxiety, depression or mental health  Heme/immune/allergy: No history of bleeding or clotting problems or anemia.  No allergies or immune system problems  Endocrine: No history of thyroid disease, diabetes or other endocrine disorders  Skin: No rashes,worrisome lesions or skin problems  Vascular: Bilateral hands and feet are cold feet are worse than hands discoloration and turning into bluish-whitish purplish and reddish    EXAM:  /79 (BP Location: Left arm, Patient Position: Chair, Cuff Size: Adult Regular)   Pulse 99   Wt 185 lb (83.9 kg)   LMP 09/23/2023   SpO2 99%   BMI 28.04 kg/m    In general, the patient is a pleasant female in no apparent distress.    HEENT: NC/AT.  PERRLA.  EOMI.  Sclerae white, not injected.  Nares clear.  Pharynx without erythema or exudate.  Dentition intact.    Neck: No adenopathy.  No thyromegaly. Carotids +2/2 bilaterally without bruits.  No jugular venous distension.   Heart: RRR. Normal S1, S2 splits physiologically. No murmur, rub, click, or gallop. The PMI is in the 5th ICS in the midclavicular line. There is no heave.    Lungs: CTA.  No ronchi, wheezes, rales.  No dullness to percussion.   Abdomen: Soft, nontender, nondistended. No organomegaly. No AAA.  No bruits.   Extremities: All the toes are very cold with abnormal capillary response 5 to 6 seconds palpable DP PT pulses hands also cold but palpable radial pulse and well-perfused.  Minimal varicose veins with spider veins in both legs  Symmetrical appearing legs  No foot ulcers or leg ulcers      Labs:  LIPID RESULTS:  Lab Results   Component Value Date    CHOL 186 02/28/2025    CHOL 135 06/08/2018    HDL 55 02/28/2025    HDL 58 06/08/2018     (H) 02/28/2025    LDL 70 06/08/2018    TRIG 117 02/28/2025    TRIG 37  06/08/2018       LIVER ENZYME RESULTS:  Lab Results   Component Value Date    AST 21 02/28/2025    AST 20 06/08/2018    ALT 26 02/28/2025    ALT 31 06/08/2018       CBC RESULTS:  Lab Results   Component Value Date    WBC 5.9 02/28/2025    RBC 4.05 02/28/2025    HGB 12.2 02/28/2025    HCT 36.0 02/28/2025    MCV 89 02/28/2025    MCH 30.1 02/28/2025    MCHC 33.9 02/28/2025    RDW 12.1 02/28/2025     02/28/2025       BMP RESULTS:  Lab Results   Component Value Date     02/28/2025     06/08/2018    POTASSIUM 3.7 02/28/2025    POTASSIUM 3.6 04/20/2022    POTASSIUM 3.8 06/08/2018    CHLORIDE 105 02/28/2025    CHLORIDE 106 04/20/2022    CHLORIDE 107 06/08/2018    CO2 26 02/28/2025    CO2 26 04/20/2022    CO2 25 06/08/2018    ANIONGAP 9 02/28/2025    ANIONGAP 6 04/20/2022    ANIONGAP 9 06/08/2018     (H) 02/28/2025    GLC 93 10/03/2023    BUN 13.0 02/28/2025    BUN 12 04/20/2022    BUN 6 (L) 06/08/2018    CR 0.61 02/28/2025    CR 0.64 06/08/2018    GFRESTIMATED >90 02/28/2025    GFRESTIMATED >90 06/08/2018    GFRESTBLACK >90 06/08/2018    WOJCIECH 9.5 02/28/2025    WOJCIECH 9.1 06/08/2018        A1C RESULTS:  Lab Results   Component Value Date    A1C 5.6 02/28/2025       THYROID RESULTS:  Lab Results   Component Value Date    TSH 1.51 06/15/2023    TSH 1.88 04/20/2022    TSH 1.79 06/08/2018       Procedures:     Reviewed available imaging studies in the epic     Assessment and Plan:     1. Raynaud's disease without gangrene (Primary)  - Erythrocyte sedimentation rate auto; Future  - CRP inflammation; Future  - Anti Nuclear Pippa IgG by IFA with Reflex; Future  - Rheumatoid factor; Future  - amLODIPine (NORVASC) 2.5 MG tablet; Take 1 tablet (2.5 mg) by mouth daily.  Dispense: 30 tablet; Refill: 5  - nitroGLYcerin (NITRO-BID) 2 % OINT ointment; Place 1 inch (15 mg) over 12 hours onto the skin every 24 hours. Apply to affected area ( toes or fingers)  at night and avoid touching eyes or face .  Dispense: 30 g;  Refill: 3    2. IFG (impaired fasting glucose)    3. Spider veins of both lower extremities  - Compression Sleeve/Stocking Order for DME - ONLY FOR DME    4. Leg swelling  - Compression Sleeve/Stocking Order for DME - ONLY FOR DME     This is a very pleasant 44-year-old female non-smoker with history of impaired fasting glucose, spider veins of both lower extremities dealing with extreme cold toes and fingers for more than a year no history of DVT no previous leg surgeries or intervention but history of endometriosis underwent hormonal therapy and currently off of the medications.  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  End of the day legs feel heavy lower portion swelling.  No joint aches or pains and no skin lesions etc.  Her history and exam consistent with Raynaud's phenomenon.  I will obtain basic blood work ESR, CRP, rheumatoid factor and AMILCAR test  Initiate amlodipine 2.5 mg daily take in the morning and at nighttime nitroglycerin ointment prescription sent to the pharmacy and side effects discussed with the patient  Thermal protection suggested  Utilize compression stockings 20 to 30 mmHg DME prescription given  On the other hand since she works from the home consider getting either BIOflect or Cz Salus K2 compression pants on [employer] and utilize daytime  Elevate the legs when able  Education sheet on Raynaud's given to the patient  Video or office visit with me in a month    60 minutes spent on the date of the encounter doing chart review, history and exam, documentation, and further activities as noted above.      The longitudinal care of plan for the above diagnoses was addressed during this visit. Due to added complexity of care, we will continue to supprt Shayy Ndiaye and the subsequent management of this/these conditions and with ongoing continuity of care for this/these conditions.     Thank you for the consultation  This note was dictated by utilizing Dragon software  Copy of this note  to primary care provider    Shirley Johnson MD,JHONATHAN,FSVM,FNLA, FACP  Vascular Medicine  Clinical Hypertension Specialist   Clinical Lipidologist

## 2025-04-01 ENCOUNTER — LAB (OUTPATIENT)
Dept: LAB | Facility: CLINIC | Age: 45
End: 2025-04-01
Payer: COMMERCIAL

## 2025-04-01 DIAGNOSIS — I73.00 RAYNAUD'S DISEASE WITHOUT GANGRENE: ICD-10-CM

## 2025-04-01 LAB
CRP SERPL-MCNC: 3.81 MG/L
ERYTHROCYTE [SEDIMENTATION RATE] IN BLOOD BY WESTERGREN METHOD: 2 MM/HR (ref 0–20)
RHEUMATOID FACT SERPL-ACNC: <10 IU/ML

## 2025-04-01 PROCEDURE — 85652 RBC SED RATE AUTOMATED: CPT

## 2025-04-01 PROCEDURE — 36415 COLL VENOUS BLD VENIPUNCTURE: CPT

## 2025-04-01 PROCEDURE — 86140 C-REACTIVE PROTEIN: CPT

## 2025-04-01 PROCEDURE — 86038 ANTINUCLEAR ANTIBODIES: CPT

## 2025-04-01 PROCEDURE — 86431 RHEUMATOID FACTOR QUANT: CPT

## 2025-04-02 LAB — ANA SER QL IF: NEGATIVE

## 2025-04-17 ENCOUNTER — ANCILLARY PROCEDURE (OUTPATIENT)
Dept: CT IMAGING | Facility: CLINIC | Age: 45
End: 2025-04-17
Attending: PHYSICIAN ASSISTANT
Payer: COMMERCIAL

## 2025-04-17 DIAGNOSIS — M89.9 LESION OF PELVIC BONE: ICD-10-CM

## 2025-04-17 DIAGNOSIS — R91.8 PULMONARY NODULES: ICD-10-CM

## 2025-04-17 PROCEDURE — 71250 CT THORAX DX C-: CPT | Mod: GC | Performed by: RADIOLOGY

## 2025-04-17 PROCEDURE — 72192 CT PELVIS W/O DYE: CPT | Mod: GC | Performed by: RADIOLOGY

## 2025-05-27 ENCOUNTER — THERAPY VISIT (OUTPATIENT)
Dept: PHYSICAL THERAPY | Facility: CLINIC | Age: 45
End: 2025-05-27
Attending: PHYSICIAN ASSISTANT
Payer: COMMERCIAL

## 2025-05-27 DIAGNOSIS — Z90.710 S/P TOTAL ABDOMINAL HYSTERECTOMY: ICD-10-CM

## 2025-05-27 DIAGNOSIS — M62.89 PELVIC FLOOR DYSFUNCTION IN FEMALE: ICD-10-CM

## 2025-05-27 PROCEDURE — 97535 SELF CARE MNGMENT TRAINING: CPT | Mod: GP | Performed by: PHYSICAL THERAPIST

## 2025-05-27 PROCEDURE — 97140 MANUAL THERAPY 1/> REGIONS: CPT | Mod: GP | Performed by: PHYSICAL THERAPIST

## 2025-05-27 PROCEDURE — 97530 THERAPEUTIC ACTIVITIES: CPT | Mod: GP | Performed by: PHYSICAL THERAPIST

## 2025-05-27 PROCEDURE — 97162 PT EVAL MOD COMPLEX 30 MIN: CPT | Mod: GP | Performed by: PHYSICAL THERAPIST

## 2025-05-27 NOTE — PROGRESS NOTES
PHYSICAL THERAPY EVALUATION  Type of Visit: Evaluation       Fall Risk Screen:  Have you fallen 2 or more times in the past year?: No  Have you fallen and had an injury in the past year?: No    Subjective     Pt reports having endometriosis with significant scar tissue and adhesions previous to hysterectomy.   Pt reports having PT in the past with improvement but was having difficulty to isolate abdominals without increasing pelvic symptoms.  Pt reports decrease in urinary leakage since previous POC.  Pt continues to report tension in pelvic floor.  Pt reports still feeling like she has a weak core.  Pt continues to have pain with BMs.  Date of hysterectomy 10/5/2023        Presenting condition or subjective complaint: pelvic/abdominal dysfunction  Date of onset: 10/05/23    Relevant medical history: Bladder or bowel problems; Significant weakness   Dates & types of surgery: 2024 - abdominal; 3 other laparotomies prior    Prior diagnostic imaging/testing results:       Prior therapy history for the same diagnosis, illness or injury: Yes 2024    Living Environment  Social support: With a significant other or spouse   Type of home: [facility]; Multi-level   Stairs to enter the home: Yes 10 Is there a railing: Yes     Ramp: No   Stairs inside the home: Yes 20 Is there a railing: Yes     Help at home:    Equipment owned:       Employment: Yes IT industry  Hobbies/Interests: board games, music, walking outdoors    Patient goals for therapy: posture/sitting/standing - work; exercise, sports, travel    Pain assessment: abdominal and pelvic discomfort - limiting exercise and extended activity     Objective      PELVIC EVALUATION  ADDITIONAL HISTORY:  Sex assigned at birth: Female  Gender identity: Female    Pronouns: She/Her Hers      Bladder History:  Feels bladder filling: Yes  Triggers for feeling of inability to wait to go to the bathroom: No    How long can you wait to urinate: 1 hour  Gets up at night to urinate: Yes  1  Can stop the flow of urine when urinating: Yes  Volume of urine usually released: Average   Other issues: Trouble emptying bladder completely; Dribbling after urinating  Number of bladder infections in last 12 months:    Fluid intake per day: pt [id number] but reports more likely 3L;  4 oz    Medications taken for bladder: No     Activities causing urine leak: Sneeze; Other activities after urinating- this was constant but improved after all strengthening exercise ceased (not just abdominal)  Amount of urine typically leaked: dribbles/drops  Pads used to help with leaking: No        Bowel History:  pt reports hx of constipation with travel since before endometriosis  Frequency of bowel movement: 1-4x/day (partial/incomplete)  Consistency of stool: Other varies across all three examples  Ignores the urge to defecate: Sometimes  Other bowel issues: Pain when pooping; Straining to have bowel movement  Length of time spent trying to have a bowel movement: 30-45 min    Colonoscopy prior to hysterectomy showed significant restriction from scar tissue  but no imaging since hysterectomy. Pt has been reports rarely taking OTC medications for constipation and tries to complete fiber diet considerations.     Sexual Function History:  Sexual orientation: Prefer not to say    Sexually active: No  Lubrication used: No No  Pelvic pain:      Pain or difficulty with orgasms/erection/ejaculation:      State of menopause: Perimenopause (have not gone through menopause yet)  Hormone medications: No      Are you currently pregnant: No  Do you get regular exercise: Yes  I do this type of exercise: walking 30 min/day  Have you tried pelvic floor strengthening exercises for 4 weeks: No  Do you have any history of trauma that is relevant to your care that you d like to share: No      Discussed reason for referral regarding pelvic health needs and external/internal pelvic floor muscle examination with patient/guardian.  Opportunity  provided to ask questions and verbal consent for assessment and intervention was given.        PELVIC EXAM  External Visual Inspection:  At rest: Normal  With voluntary pelvic floor contraction: Perineal elevation, Present  Relaxation of PFM: Partial/delayed relaxation    Integumentary:   WNL    External Digital Palpation per Perineum:   Ischiocavernosis: Unremarkable  Bulbo cavernosis: Unremarkable  Transverse perineal: Unremarkable  Levator ani: Unremarkable  Perineal body: Unremarkable    Internal Digital Palpation:  Per Vagina:  Tenderness  Myofascial Resistance to Palpation: Firm  Digital Muscle Performance: P (Power): 3  E (Endurance): 5  R (Repetitions): 6  F (Fast Twitch): 6  Compensations: Gluts, Breath holding, but improved with cues    Pelvic Organ Prolapse:   WNL      ABDOMINAL ASSESSMENT  Diastasis Rectus Abdominis (TOMA):   Above Umbilicus Depth/Finger width: 4-->2   At Umbilicus Depth/Finger width: 2   Below Umbilicus Depth/Finger width: 1    Abdominal Activation/Strength: poor and sig compensation    Scar:   Location/Type: abdominal from hysterectomy  Mobility: Hypomobile    Fascial Tension/Restriction: Hypomobile    Assessment & Plan   CLINICAL IMPRESSIONS  Medical Diagnosis: Pelvic floor dysfunction in female  S/P total abdominal hysterectomy    Treatment Diagnosis: incomplete bowel emptying, incomplete bladder emptying, stress incontinence,   Impression/Assessment: Patient is a 44 year old female with pelvic floor dysfunction and weakness complaints.  The following significant findings have been identified: Pain, Decreased ROM/flexibility, Decreased strength, Impaired muscle performance, and Decreased activity tolerance. These impairments interfere with their ability to perform self care tasks, work tasks, recreational activities, and household chores as compared to previous level of function.     Clinical Decision Making (Complexity):  Clinical Presentation: Evolving/Changing  Clinical  Presentation Rationale: based on medical and personal factors listed in PT evaluation  Clinical Decision Making (Complexity): Moderate complexity    PLAN OF CARE  Treatment Interventions:  Modalities: Modalities as required  Interventions: Manual Therapy, Neuromuscular Re-education, Therapeutic Activity, Therapeutic Exercise, Self-Care/Home Management    Long Term Goals     PT Goal 1  Goal Identifier: 1  Goal Description: The patient will be able to complete regular strength training program without increase in bladder symptoms to improve self management of back pain.  Target Date: 11/27/25  PT Goal 2  Goal Identifier: 2  Goal Description: The patient will demonstrate score <100 on PFDI to improve functional tolerance of ADLs  Target Date: 11/27/25  PT Goal 3  Goal Identifier: 3  Goal Description: The patient will deny sensation of incomplete emptying for 2 weeks to improve bowel function with IADLs  Target Date: 11/27/25      Frequency of Treatment: 10  Duration of Treatment: 6 months    Recommended Referrals to Other Professionals: None indicated  Education Assessment:   Learner/Method: Patient;Listening;Demonstration;Pictures/Video;No Barriers to Learning    Risks and benefits of evaluation/treatment have been explained.   Patient/Family/caregiver agrees with Plan of Care.     Evaluation Time:     PT Eval, Low Complexity Minutes (47051): 25       Signing Clinician: Sunita Alfred, PT

## 2025-06-03 ENCOUNTER — THERAPY VISIT (OUTPATIENT)
Dept: PHYSICAL THERAPY | Facility: CLINIC | Age: 45
End: 2025-06-03
Attending: PHYSICIAN ASSISTANT
Payer: COMMERCIAL

## 2025-06-03 DIAGNOSIS — M62.89 PELVIC FLOOR DYSFUNCTION IN FEMALE: Primary | ICD-10-CM

## 2025-06-03 DIAGNOSIS — Z90.710 S/P TOTAL ABDOMINAL HYSTERECTOMY: ICD-10-CM

## 2025-06-03 PROCEDURE — 97140 MANUAL THERAPY 1/> REGIONS: CPT | Mod: GP | Performed by: PHYSICAL THERAPIST

## 2025-06-03 PROCEDURE — 97112 NEUROMUSCULAR REEDUCATION: CPT | Mod: GP | Performed by: PHYSICAL THERAPIST

## 2025-06-10 ENCOUNTER — THERAPY VISIT (OUTPATIENT)
Dept: PHYSICAL THERAPY | Facility: CLINIC | Age: 45
End: 2025-06-10
Attending: PHYSICIAN ASSISTANT
Payer: COMMERCIAL

## 2025-06-10 DIAGNOSIS — M62.89 PELVIC FLOOR DYSFUNCTION IN FEMALE: Primary | ICD-10-CM

## 2025-06-10 DIAGNOSIS — Z90.710 S/P TOTAL ABDOMINAL HYSTERECTOMY: ICD-10-CM

## 2025-06-10 PROCEDURE — 97530 THERAPEUTIC ACTIVITIES: CPT | Mod: GP | Performed by: PHYSICAL THERAPIST

## 2025-06-10 PROCEDURE — 97112 NEUROMUSCULAR REEDUCATION: CPT | Mod: GP | Performed by: PHYSICAL THERAPIST

## 2025-06-10 PROCEDURE — 97140 MANUAL THERAPY 1/> REGIONS: CPT | Mod: GP | Performed by: PHYSICAL THERAPIST

## 2025-06-17 ENCOUNTER — THERAPY VISIT (OUTPATIENT)
Dept: PHYSICAL THERAPY | Facility: CLINIC | Age: 45
End: 2025-06-17
Payer: COMMERCIAL

## 2025-06-17 DIAGNOSIS — Z90.710 S/P TOTAL ABDOMINAL HYSTERECTOMY: ICD-10-CM

## 2025-06-17 DIAGNOSIS — N39.46 MIXED STRESS AND URGE URINARY INCONTINENCE: ICD-10-CM

## 2025-06-17 DIAGNOSIS — M99.05 SOMATIC DYSFUNCTION OF PELVIC REGION: ICD-10-CM

## 2025-06-17 DIAGNOSIS — M62.89 PELVIC FLOOR DYSFUNCTION IN FEMALE: Primary | ICD-10-CM

## 2025-06-17 PROCEDURE — 97140 MANUAL THERAPY 1/> REGIONS: CPT | Mod: GP | Performed by: PHYSICAL THERAPIST

## 2025-06-17 PROCEDURE — 97112 NEUROMUSCULAR REEDUCATION: CPT | Mod: GP | Performed by: PHYSICAL THERAPIST

## 2025-06-22 DIAGNOSIS — R73.01 IFG (IMPAIRED FASTING GLUCOSE): ICD-10-CM

## 2025-06-23 ENCOUNTER — PATIENT OUTREACH (OUTPATIENT)
Dept: CARE COORDINATION | Facility: CLINIC | Age: 45
End: 2025-06-23
Payer: COMMERCIAL

## 2025-06-23 RX ORDER — METFORMIN HYDROCHLORIDE 500 MG/1
1000 TABLET, EXTENDED RELEASE ORAL DAILY
Qty: 180 TABLET | Refills: 0 | Status: SHIPPED | OUTPATIENT
Start: 2025-06-23

## 2025-06-25 ENCOUNTER — PATIENT OUTREACH (OUTPATIENT)
Dept: CARE COORDINATION | Facility: CLINIC | Age: 45
End: 2025-06-25
Payer: COMMERCIAL

## 2025-07-01 ENCOUNTER — THERAPY VISIT (OUTPATIENT)
Dept: PHYSICAL THERAPY | Facility: CLINIC | Age: 45
End: 2025-07-01
Payer: COMMERCIAL

## 2025-07-01 DIAGNOSIS — N39.46 MIXED STRESS AND URGE URINARY INCONTINENCE: ICD-10-CM

## 2025-07-01 DIAGNOSIS — M99.05 SOMATIC DYSFUNCTION OF PELVIC REGION: ICD-10-CM

## 2025-07-01 DIAGNOSIS — M62.89 PELVIC FLOOR DYSFUNCTION IN FEMALE: Primary | ICD-10-CM

## 2025-07-01 PROCEDURE — 97140 MANUAL THERAPY 1/> REGIONS: CPT | Mod: GP | Performed by: PHYSICAL THERAPIST

## 2025-07-01 PROCEDURE — 97110 THERAPEUTIC EXERCISES: CPT | Mod: GP | Performed by: PHYSICAL THERAPIST

## 2025-07-15 ENCOUNTER — LAB (OUTPATIENT)
Dept: LAB | Facility: CLINIC | Age: 45
End: 2025-07-15
Payer: COMMERCIAL

## 2025-07-15 DIAGNOSIS — G47.00 PERSISTENT DISORDER OF INITIATING OR MAINTAINING SLEEP: ICD-10-CM

## 2025-07-15 DIAGNOSIS — Z90.721 S/P UNILATERAL SALPINGO-OOPHORECTOMY: ICD-10-CM

## 2025-07-15 LAB
ESTRADIOL SERPL-MCNC: 69 PG/ML
FSH SERPL IRP2-ACNC: 5.3 MIU/ML
LH SERPL-ACNC: 8.3 MIU/ML
TSH SERPL DL<=0.005 MIU/L-ACNC: 1.54 UIU/ML (ref 0.3–4.2)

## 2025-07-15 PROCEDURE — 84403 ASSAY OF TOTAL TESTOSTERONE: CPT

## 2025-07-15 PROCEDURE — 84443 ASSAY THYROID STIM HORMONE: CPT

## 2025-07-15 PROCEDURE — 83002 ASSAY OF GONADOTROPIN (LH): CPT

## 2025-07-15 PROCEDURE — 83001 ASSAY OF GONADOTROPIN (FSH): CPT

## 2025-07-15 PROCEDURE — 82670 ASSAY OF TOTAL ESTRADIOL: CPT

## 2025-07-15 PROCEDURE — 36415 COLL VENOUS BLD VENIPUNCTURE: CPT

## 2025-07-17 LAB — TESTOST SERPL-MCNC: 12 NG/DL (ref 8–60)

## 2025-07-22 ENCOUNTER — THERAPY VISIT (OUTPATIENT)
Dept: PHYSICAL THERAPY | Facility: CLINIC | Age: 45
End: 2025-07-22
Payer: COMMERCIAL

## 2025-07-22 DIAGNOSIS — Z90.710 S/P TOTAL ABDOMINAL HYSTERECTOMY: ICD-10-CM

## 2025-07-22 DIAGNOSIS — M99.05 SOMATIC DYSFUNCTION OF PELVIC REGION: ICD-10-CM

## 2025-07-22 DIAGNOSIS — M62.89 PELVIC FLOOR DYSFUNCTION IN FEMALE: Primary | ICD-10-CM

## 2025-07-22 DIAGNOSIS — N39.46 MIXED STRESS AND URGE URINARY INCONTINENCE: ICD-10-CM

## 2025-07-22 PROCEDURE — 97535 SELF CARE MNGMENT TRAINING: CPT | Mod: GP

## 2025-07-22 PROCEDURE — 97140 MANUAL THERAPY 1/> REGIONS: CPT | Mod: GP

## 2025-07-22 PROCEDURE — 97110 THERAPEUTIC EXERCISES: CPT | Mod: GP

## 2025-08-05 ENCOUNTER — THERAPY VISIT (OUTPATIENT)
Dept: PHYSICAL THERAPY | Facility: CLINIC | Age: 45
End: 2025-08-05
Payer: COMMERCIAL

## 2025-08-05 DIAGNOSIS — M62.89 PELVIC FLOOR DYSFUNCTION IN FEMALE: Primary | ICD-10-CM

## 2025-08-05 DIAGNOSIS — N39.46 MIXED STRESS AND URGE URINARY INCONTINENCE: ICD-10-CM

## 2025-08-05 DIAGNOSIS — M99.05 SOMATIC DYSFUNCTION OF PELVIC REGION: ICD-10-CM

## 2025-08-11 ENCOUNTER — NURSE TRIAGE (OUTPATIENT)
Dept: FAMILY MEDICINE | Facility: CLINIC | Age: 45
End: 2025-08-11
Payer: COMMERCIAL

## 2025-08-13 ENCOUNTER — OFFICE VISIT (OUTPATIENT)
Dept: FAMILY MEDICINE | Facility: CLINIC | Age: 45
End: 2025-08-13
Payer: COMMERCIAL

## 2025-08-13 VITALS
SYSTOLIC BLOOD PRESSURE: 130 MMHG | HEART RATE: 98 BPM | RESPIRATION RATE: 16 BRPM | WEIGHT: 190.9 LBS | BODY MASS INDEX: 28.93 KG/M2 | DIASTOLIC BLOOD PRESSURE: 63 MMHG | HEIGHT: 68 IN | OXYGEN SATURATION: 98 %

## 2025-08-13 DIAGNOSIS — L90.5 UNSTABLE SURGICAL SCAR: Primary | ICD-10-CM

## 2025-08-13 PROCEDURE — 3078F DIAST BP <80 MM HG: CPT | Performed by: PHYSICIAN ASSISTANT

## 2025-08-13 PROCEDURE — 99213 OFFICE O/P EST LOW 20 MIN: CPT | Performed by: PHYSICIAN ASSISTANT

## 2025-08-13 PROCEDURE — 3075F SYST BP GE 130 - 139MM HG: CPT | Performed by: PHYSICIAN ASSISTANT

## 2025-08-13 PROCEDURE — 1126F AMNT PAIN NOTED NONE PRSNT: CPT | Performed by: PHYSICIAN ASSISTANT

## 2025-08-13 ASSESSMENT — PAIN SCALES - GENERAL: PAINLEVEL_OUTOF10: NO PAIN (0)

## 2025-08-14 ENCOUNTER — PATIENT OUTREACH (OUTPATIENT)
Dept: CARE COORDINATION | Facility: CLINIC | Age: 45
End: 2025-08-14
Payer: COMMERCIAL

## 2025-08-19 ENCOUNTER — THERAPY VISIT (OUTPATIENT)
Dept: PHYSICAL THERAPY | Facility: CLINIC | Age: 45
End: 2025-08-19
Payer: COMMERCIAL

## 2025-08-19 DIAGNOSIS — M99.05 SOMATIC DYSFUNCTION OF PELVIC REGION: ICD-10-CM

## 2025-08-19 DIAGNOSIS — N39.46 MIXED STRESS AND URGE URINARY INCONTINENCE: ICD-10-CM

## 2025-08-19 DIAGNOSIS — M62.89 PELVIC FLOOR DYSFUNCTION IN FEMALE: Primary | ICD-10-CM

## 2025-08-19 PROCEDURE — 97140 MANUAL THERAPY 1/> REGIONS: CPT | Mod: GP | Performed by: PHYSICAL THERAPIST

## 2025-08-25 ENCOUNTER — PATIENT OUTREACH (OUTPATIENT)
Dept: CARE COORDINATION | Facility: CLINIC | Age: 45
End: 2025-08-25
Payer: COMMERCIAL

## 2025-08-26 ENCOUNTER — VIRTUAL VISIT (OUTPATIENT)
Dept: BEHAVIORAL HEALTH | Facility: CLINIC | Age: 45
End: 2025-08-26
Attending: PHYSICIAN ASSISTANT
Payer: COMMERCIAL

## 2025-08-26 ENCOUNTER — OFFICE VISIT (OUTPATIENT)
Dept: WOUND CARE | Facility: CLINIC | Age: 45
End: 2025-08-26
Attending: FAMILY MEDICINE
Payer: COMMERCIAL

## 2025-08-26 VITALS
HEIGHT: 68 IN | BODY MASS INDEX: 29.19 KG/M2 | TEMPERATURE: 97 F | HEART RATE: 91 BPM | SYSTOLIC BLOOD PRESSURE: 148 MMHG | WEIGHT: 192.6 LBS | DIASTOLIC BLOOD PRESSURE: 87 MMHG

## 2025-08-26 DIAGNOSIS — S31.109A OPEN WOUND OF ABDOMINAL WALL, INITIAL ENCOUNTER: Primary | ICD-10-CM

## 2025-08-26 DIAGNOSIS — F51.04 PSYCHOPHYSIOLOGICAL INSOMNIA: Primary | ICD-10-CM

## 2025-08-26 DIAGNOSIS — G47.00 PERSISTENT DISORDER OF INITIATING OR MAINTAINING SLEEP: ICD-10-CM

## 2025-08-26 PROCEDURE — 99213 OFFICE O/P EST LOW 20 MIN: CPT | Performed by: FAMILY MEDICINE

## 2025-08-26 PROCEDURE — 90832 PSYTX W PT 30 MINUTES: CPT | Mod: 95 | Performed by: MARRIAGE & FAMILY THERAPIST

## 2025-08-26 ASSESSMENT — PATIENT HEALTH QUESTIONNAIRE - PHQ9
SUM OF ALL RESPONSES TO PHQ QUESTIONS 1-9: 7
SUM OF ALL RESPONSES TO PHQ QUESTIONS 1-9: 7
10. IF YOU CHECKED OFF ANY PROBLEMS, HOW DIFFICULT HAVE THESE PROBLEMS MADE IT FOR YOU TO DO YOUR WORK, TAKE CARE OF THINGS AT HOME, OR GET ALONG WITH OTHER PEOPLE: SOMEWHAT DIFFICULT

## (undated) DEVICE — ESU GROUND PAD ADULT W/CORD E7507

## (undated) DEVICE — SOL RINGERS LACTATED 1000ML BAG 2B2324X

## (undated) DEVICE — ENDO SCOPE WARMER LF TM500

## (undated) DEVICE — SUCTION CANISTER MEDIVAC LINER 3000ML W/LID 65651-530

## (undated) DEVICE — GLOVE PROTEXIS W/NEU-THERA 8.0  2D73TE80

## (undated) DEVICE — TUBING C02 INSUFFLATION LAP FILTER HEATER 6198

## (undated) DEVICE — DEVICE SUTURE GRASPER TROCAR CLOSURE 14GA PMITCSG

## (undated) DEVICE — Device

## (undated) DEVICE — PACK TVT HYSTEROSCOPY SMA15HYFSE

## (undated) DEVICE — JELLY LUBRICATING SURGILUBE 2OZ TUBE

## (undated) DEVICE — SOL WATER IRRIG 1000ML BOTTLE 2F7114

## (undated) DEVICE — ESU CORD MONOPOLAR 10'  E0510

## (undated) DEVICE — CATH TRAY FOLEY SURESTEP 16FR WDRAIN BAG STLK LATEX A300316A

## (undated) DEVICE — ESU ELEC BLADE 6" COATED/INSULATED E1455-6

## (undated) DEVICE — SOL NACL 0.9% INJ 1000ML BAG 2B1324X

## (undated) DEVICE — NDL INSUFFLATION 13GA 120MM C2201

## (undated) DEVICE — SU VICRYL 3-0 SH 27" J316H

## (undated) DEVICE — BLADE KNIFE SURG 10 371110

## (undated) DEVICE — LINEN TOWEL PACK X5 5464

## (undated) DEVICE — WIPES FOLEY CARE SURESTEP PROVON DFC100

## (undated) DEVICE — GLOVE BIOGEL PI ULTRATOUCH SZ 6.5 41165

## (undated) DEVICE — SU VICRYL 4-0 PS-2 18" UND J496H

## (undated) DEVICE — GOWN XLG DISP 9545

## (undated) DEVICE — EVAC SYSTEM CLEAR FLOW SC082500

## (undated) DEVICE — GLOVE BIOGEL PI MICRO INDICATOR UNDERGLOVE SZ 7.0 48970

## (undated) DEVICE — SYSTEM CLEARIFY VISUALIZATION 21-345

## (undated) DEVICE — SUCTION IRR STRYKERFLOW II W/TIP 250-070-520

## (undated) DEVICE — ESU HOLDER LAP INST DISP PURPLE LONG 330MM H-PRO-330

## (undated) DEVICE — NDL 19GA 1.5"

## (undated) DEVICE — KIT PATIENT POSITIONING PIGAZZI LATEX FREE 40580

## (undated) DEVICE — PREP SKIN SCRUB TRAY 4461A

## (undated) DEVICE — ESU ELEC BLADE 6" COATED E1450-6

## (undated) DEVICE — ENDO TROCAR FIRST ENTRY KII FIOS Z-THRD 12X100MM CTF73

## (undated) DEVICE — DRAPE MAYO STAND 23X54 8337

## (undated) DEVICE — ESU PENCIL W/HOLSTER E2350H

## (undated) DEVICE — SU VICRYL 2-0 TIE 12X18" J905T

## (undated) DEVICE — SURGICEL HEMOSTAT 2X14" 1951

## (undated) DEVICE — SU VICRYL 0 CT-1 36" J346H

## (undated) DEVICE — SU VICRYL 0 CT-2 27" J334H

## (undated) DEVICE — TUBING IRRIG CYSTO/BLADDER SET 81" LF 2C4040

## (undated) DEVICE — SUCTION CANISTER MEDIVAC LINER 1500ML W/LID 65651-515

## (undated) DEVICE — ENDO TROCAR FIRST ENTRY KII FIOS Z-THRD 05X100MM CTF03

## (undated) DEVICE — DRAPE LEGGINGS 8421

## (undated) DEVICE — SU DERMABOND ADVANCED .7ML DNX12

## (undated) DEVICE — ESU LIGASURE IMPACT OPEN SEALER/DVDR CVD LG JAW LF4418

## (undated) DEVICE — VESSEL LOOPS YELLOW MINI 31145660

## (undated) DEVICE — GLOVE PROTEXIS W/NEU-THERA 7.5  2D73TE75

## (undated) DEVICE — CATH TRAY FOLEY 16FR BARDEX W/DRAIN BAG STATLOCK 300316A

## (undated) DEVICE — SYR 05ML SLIP TIP W/O NDL 309647

## (undated) DEVICE — TUBING HYDRO-SURG PLUS LAP IRRIGATOR SUCTION 0026870

## (undated) DEVICE — GOWN IMPERVIOUS SPECIALTY XLG/XLONG 32474

## (undated) DEVICE — SU PDS II 0 CTX 60" Z990G

## (undated) DEVICE — DECANTER BAG 2002S

## (undated) DEVICE — TUBING SUCTION 12"X1/4" N612

## (undated) DEVICE — TUBING IRRIG TUR Y TYPE 96" LF 6543-01

## (undated) DEVICE — GLOVE PROTEXIS MICRO 7.5  2D73PM75

## (undated) DEVICE — SPONGE LAP 18X18" X8435

## (undated) DEVICE — MANIFOLD NEPTUNE 4 PORT 700-20

## (undated) DEVICE — ENDO TROCAR SLEEVE KII Z-THREADED 05X100MM CTS02

## (undated) DEVICE — BNDG ABDOMINAL BINDER 9X45-62" 79-89071

## (undated) DEVICE — ESU GROUND PAD UNIVERSAL W/O CORD

## (undated) RX ORDER — FENTANYL CITRATE 0.05 MG/ML
INJECTION, SOLUTION INTRAMUSCULAR; INTRAVENOUS
Status: DISPENSED
Start: 2020-10-06

## (undated) RX ORDER — HEPARIN SODIUM 1000 [USP'U]/ML
INJECTION, SOLUTION INTRAVENOUS; SUBCUTANEOUS
Status: DISPENSED
Start: 2020-10-06

## (undated) RX ORDER — PHENAZOPYRIDINE HYDROCHLORIDE 200 MG/1
TABLET, FILM COATED ORAL
Status: DISPENSED
Start: 2023-10-04

## (undated) RX ORDER — SCOLOPAMINE TRANSDERMAL SYSTEM 1 MG/1
PATCH, EXTENDED RELEASE TRANSDERMAL
Status: DISPENSED
Start: 2020-10-06

## (undated) RX ORDER — HYDROMORPHONE HYDROCHLORIDE 1 MG/ML
INJECTION, SOLUTION INTRAMUSCULAR; INTRAVENOUS; SUBCUTANEOUS
Status: DISPENSED
Start: 2023-10-04

## (undated) RX ORDER — FENTANYL CITRATE 50 UG/ML
INJECTION, SOLUTION INTRAMUSCULAR; INTRAVENOUS
Status: DISPENSED
Start: 2023-10-04

## (undated) RX ORDER — KETOROLAC TROMETHAMINE 30 MG/ML
INJECTION, SOLUTION INTRAMUSCULAR; INTRAVENOUS
Status: DISPENSED
Start: 2020-10-06

## (undated) RX ORDER — FENTANYL CITRATE 0.05 MG/ML
INJECTION, SOLUTION INTRAMUSCULAR; INTRAVENOUS
Status: DISPENSED
Start: 2023-10-04

## (undated) RX ORDER — FENTANYL CITRATE 50 UG/ML
INJECTION, SOLUTION INTRAMUSCULAR; INTRAVENOUS
Status: DISPENSED
Start: 2020-10-06

## (undated) RX ORDER — HYDROMORPHONE HCL IN WATER/PF 6 MG/30 ML
PATIENT CONTROLLED ANALGESIA SYRINGE INTRAVENOUS
Status: DISPENSED
Start: 2023-10-04

## (undated) RX ORDER — DEXTROSE MONOHYDRATE 50 MG/ML
INJECTION, SOLUTION INTRAVENOUS
Status: DISPENSED
Start: 2020-10-06

## (undated) RX ORDER — ONDANSETRON 2 MG/ML
INJECTION INTRAMUSCULAR; INTRAVENOUS
Status: DISPENSED
Start: 2023-10-04

## (undated) RX ORDER — PROPOFOL 10 MG/ML
INJECTION, EMULSION INTRAVENOUS
Status: DISPENSED
Start: 2020-10-06

## (undated) RX ORDER — FENTANYL CITRATE 50 UG/ML
INJECTION, SOLUTION INTRAMUSCULAR; INTRAVENOUS
Status: DISPENSED
Start: 2023-09-20

## (undated) RX ORDER — CEFAZOLIN SODIUM/WATER 2 G/20 ML
SYRINGE (ML) INTRAVENOUS
Status: DISPENSED
Start: 2023-10-04

## (undated) RX ORDER — HYDROMORPHONE HYDROCHLORIDE 1 MG/ML
INJECTION, SOLUTION INTRAMUSCULAR; INTRAVENOUS; SUBCUTANEOUS
Status: DISPENSED
Start: 2020-10-06

## (undated) RX ORDER — PROPOFOL 10 MG/ML
INJECTION, EMULSION INTRAVENOUS
Status: DISPENSED
Start: 2023-10-04

## (undated) RX ORDER — HYDROCODONE BITARTRATE AND ACETAMINOPHEN 5; 325 MG/1; MG/1
TABLET ORAL
Status: DISPENSED
Start: 2020-10-06

## (undated) RX ORDER — DIPHENHYDRAMINE HYDROCHLORIDE 50 MG/ML
INJECTION INTRAMUSCULAR; INTRAVENOUS
Status: DISPENSED
Start: 2023-09-20

## (undated) RX ORDER — HYDROXYZINE HYDROCHLORIDE 50 MG/ML
INJECTION, SOLUTION INTRAMUSCULAR
Status: DISPENSED
Start: 2020-10-06

## (undated) RX ORDER — SCOLOPAMINE TRANSDERMAL SYSTEM 1 MG/1
PATCH, EXTENDED RELEASE TRANSDERMAL
Status: DISPENSED
Start: 2023-10-04

## (undated) RX ORDER — HYDROXYZINE HYDROCHLORIDE 50 MG/ML
INJECTION, SOLUTION INTRAMUSCULAR
Status: DISPENSED
Start: 2023-10-04

## (undated) RX ORDER — DEXAMETHASONE SODIUM PHOSPHATE 4 MG/ML
INJECTION, SOLUTION INTRA-ARTICULAR; INTRALESIONAL; INTRAMUSCULAR; INTRAVENOUS; SOFT TISSUE
Status: DISPENSED
Start: 2023-10-04

## (undated) RX ORDER — HEPARIN SODIUM 5000 [USP'U]/.5ML
INJECTION, SOLUTION INTRAVENOUS; SUBCUTANEOUS
Status: DISPENSED
Start: 2023-10-04

## (undated) RX ORDER — METRONIDAZOLE 500 MG/100ML
INJECTION, SOLUTION INTRAVENOUS
Status: DISPENSED
Start: 2023-10-04